# Patient Record
Sex: MALE | Race: OTHER | Employment: UNEMPLOYED | URBAN - METROPOLITAN AREA
[De-identification: names, ages, dates, MRNs, and addresses within clinical notes are randomized per-mention and may not be internally consistent; named-entity substitution may affect disease eponyms.]

---

## 2020-07-24 NOTE — LETTER
[facility] 2W/SW  [address] [facility] [address]  [phone]    Blood Transfusion Consent    In the course of your treatment, it may become necessary to administer a transfusion of blood or blood components. This form provides basic information concerning this procedure and, if signed by you, authorizes its administration. By signing this form, you agree that all of your questions about the administration of blood or blood products have been answered by the ordering medical professional or designee.    Description of Procedure  Blood is introduced into one of your veins, commonly in the arm, using a sterilized disposable needle. The amount of blood transfused, and whether the transfusion will be of blood or blood components is a judgement the physician will make based on your particular needs.    Risks  The transfusion is a common procedure of low risk.  MINOR AND TEMPORARY REACTIONS ARE NOT UNCOMMON, including a slight bruise, swelling or local reaction in the area where the needle pierces your skin, or a nonserious reaction to the transfused material itself, including headache, fever or mild skin reaction, such as rash.  Serious reactions are possible, though very unlikely, and include severe allergic reaction (shock) and destruction (hemolysis) of transfused blood cells.  Infectious diseases which are known to be transmitted by blood transfusion include certain types of viral Hepatitis(liver infection from a virus), Human Immunodeficiency Virus (HIV-1,2) infection, a viral infection known to cause Acquired Immunodeficiency Syndrome (AIDS), as well as certain other bacterial, viral, and parasitic diseases. While a minimal risk of acquiring an infectious disease from transfused blood exists, in accordance with the Federal and State law, all due care has been taken in donor selection and testing to avoid transmission of disease.    Alternatives  If loss of blood poses serious threats during your  treatment, THERE IS NO EFFECTIVE ALTERNATIVE TO BLOOD TRANSFUSION. However, if you have any further questions on this matter, your provider will fully explain the alternatives to you if it has not already been done.    I, ______________________________, have read/had read to me the above. I understand the matters bearing on the decision whether or not to authorize a transfusion of blood or blood components. I have no questions which have not been answered to my full satisfaction. I hereby consent to such transfusion as my physician may deem necessary or advisable in the course of my treatment.    ______________________________________________                    ___________________________  (Signature of Patient or Responsible party in case of minor,                 (Printed Name of Patient or incompetent, or unconscious patient)              Responsible Party)    ___________________________               _____________________  (Relationship to Patient if not self)                                    (Date and Time)    __________________________                                                           ______________________              (Signature of Witness)               (Printed Name of Witness)     Language line ()    Telephone/Verbal/Video Consent    __________________________                     ____________________  (Signature of 2nd Witness           (Printed Name of 2nd  Telephone/Verbal/Video Consent)           Witness)    Patient Name: Shane Hawthorne     : 7/10/1970                 Printed: 2024     Medical Record #: [id number]      Rev: 2023   24-Jul-2020 01:25

## 2021-05-20 ENCOUNTER — APPOINTMENT (OUTPATIENT)
Dept: CT IMAGING | Facility: HOSPITAL | Age: 51
End: 2021-05-20
Attending: EMERGENCY MEDICINE
Payer: COMMERCIAL

## 2021-05-20 ENCOUNTER — HOSPITAL ENCOUNTER (EMERGENCY)
Facility: HOSPITAL | Age: 51
Discharge: LEFT BEFORE DISCHARGE | End: 2021-05-20
Attending: EMERGENCY MEDICINE
Payer: COMMERCIAL

## 2021-05-20 VITALS
BODY MASS INDEX: 32.58 KG/M2 | SYSTOLIC BLOOD PRESSURE: 161 MMHG | TEMPERATURE: 98 F | RESPIRATION RATE: 15 BRPM | OXYGEN SATURATION: 96 % | HEIGHT: 69 IN | HEART RATE: 84 BPM | WEIGHT: 220 LBS | DIASTOLIC BLOOD PRESSURE: 105 MMHG

## 2021-05-20 DIAGNOSIS — N17.9 ACUTE RENAL FAILURE SUPERIMPOSED ON CHRONIC KIDNEY DISEASE, ON CHRONIC DIALYSIS, UNSPECIFIED ACUTE RENAL FAILURE TYPE (HCC): ICD-10-CM

## 2021-05-20 DIAGNOSIS — R41.82 ALTERED MENTAL STATUS, UNSPECIFIED ALTERED MENTAL STATUS TYPE: Primary | ICD-10-CM

## 2021-05-20 DIAGNOSIS — Z99.2 ACUTE RENAL FAILURE SUPERIMPOSED ON CHRONIC KIDNEY DISEASE, ON CHRONIC DIALYSIS, UNSPECIFIED ACUTE RENAL FAILURE TYPE (HCC): ICD-10-CM

## 2021-05-20 DIAGNOSIS — N18.9 ACUTE RENAL FAILURE SUPERIMPOSED ON CHRONIC KIDNEY DISEASE, ON CHRONIC DIALYSIS, UNSPECIFIED ACUTE RENAL FAILURE TYPE (HCC): ICD-10-CM

## 2021-05-20 PROCEDURE — 36415 COLL VENOUS BLD VENIPUNCTURE: CPT

## 2021-05-20 PROCEDURE — 80307 DRUG TEST PRSMV CHEM ANLYZR: CPT | Performed by: EMERGENCY MEDICINE

## 2021-05-20 PROCEDURE — 80048 BASIC METABOLIC PNL TOTAL CA: CPT | Performed by: EMERGENCY MEDICINE

## 2021-05-20 PROCEDURE — 82077 ASSAY SPEC XCP UR&BREATH IA: CPT | Performed by: EMERGENCY MEDICINE

## 2021-05-20 PROCEDURE — 93005 ELECTROCARDIOGRAM TRACING: CPT

## 2021-05-20 PROCEDURE — 85610 PROTHROMBIN TIME: CPT | Performed by: EMERGENCY MEDICINE

## 2021-05-20 PROCEDURE — 82962 GLUCOSE BLOOD TEST: CPT

## 2021-05-20 PROCEDURE — 81001 URINALYSIS AUTO W/SCOPE: CPT | Performed by: EMERGENCY MEDICINE

## 2021-05-20 PROCEDURE — 85025 COMPLETE CBC W/AUTO DIFF WBC: CPT | Performed by: EMERGENCY MEDICINE

## 2021-05-20 PROCEDURE — 84484 ASSAY OF TROPONIN QUANT: CPT | Performed by: EMERGENCY MEDICINE

## 2021-05-20 PROCEDURE — 93010 ELECTROCARDIOGRAM REPORT: CPT | Performed by: EMERGENCY MEDICINE

## 2021-05-20 PROCEDURE — 99284 EMERGENCY DEPT VISIT MOD MDM: CPT

## 2021-05-20 PROCEDURE — 70450 CT HEAD/BRAIN W/O DYE: CPT | Performed by: EMERGENCY MEDICINE

## 2021-05-20 RX ORDER — GLIPIZIDE 10 MG/1
20 TABLET ORAL 2 TIMES DAILY
COMMUNITY
Start: 2021-05-05

## 2021-05-20 RX ORDER — FUROSEMIDE 40 MG/1
1 TABLET ORAL 2 TIMES DAILY
COMMUNITY
Start: 2021-01-29

## 2021-05-20 RX ORDER — CARVEDILOL 25 MG/1
50 TABLET ORAL DAILY
COMMUNITY
Start: 2021-05-01

## 2021-05-20 RX ORDER — ALBUTEROL SULFATE 90 UG/1
2 AEROSOL, METERED RESPIRATORY (INHALATION) EVERY 6 HOURS PRN
COMMUNITY
Start: 2021-05-03

## 2021-05-20 RX ORDER — ATORVASTATIN CALCIUM 20 MG/1
20 TABLET, FILM COATED ORAL NIGHTLY
COMMUNITY
Start: 2021-02-26

## 2021-05-21 NOTE — ED QUICK NOTES
Orders for admission. Patient and/or next of kin aware of plan and is ready to go upstairs. Please call ED RN Selam Swartz at listed extension should you have any further questions or concerns. Thank you.     Nurse and ExtJustice Manolo BHAT, [id number]    Chief Presentation: Arsen Martinez

## 2021-05-21 NOTE — ED INITIAL ASSESSMENT (HPI)
Pt endorsed confusion to pt's spouse. +diaphoretic    bg with ems 58. 100cc of d10 given, 115 bg afterwards. Pt came from [facility] yesterday. Fully vaccinated.

## 2021-05-21 NOTE — ED QUICK NOTES
Repeat accucheck is 74. Pt provided orange juice. Pt continues to be a&ox3, cade, vss. Pt endorsed that he hasn't eaten all day.

## 2021-05-21 NOTE — ED QUICK NOTES
Pt provided and explained d/c instructions, at-home care, and follow-up. Pt in nad at this time. Iv access d/c. Vss. Luu. Ambulatory. A&ox3. Belongings with pt. All questions and concerns addressed. Pt signed AMA form.  Lynne spoke with pt at length maciel

## 2022-05-03 ENCOUNTER — OFFICE VISIT (OUTPATIENT)
Dept: INTERNAL MEDICINE CLINIC | Facility: CLINIC | Age: 52
End: 2022-05-03
Payer: COMMERCIAL

## 2022-05-03 VITALS
HEART RATE: 75 BPM | WEIGHT: 185 LBS | BODY MASS INDEX: 27.4 KG/M2 | SYSTOLIC BLOOD PRESSURE: 121 MMHG | HEIGHT: 69 IN | DIASTOLIC BLOOD PRESSURE: 67 MMHG

## 2022-05-03 DIAGNOSIS — N18.6 TYPE 2 DIABETES MELLITUS WITH CHRONIC KIDNEY DISEASE ON CHRONIC DIALYSIS, WITHOUT LONG-TERM CURRENT USE OF INSULIN (HCC): ICD-10-CM

## 2022-05-03 DIAGNOSIS — E11.22 TYPE 2 DIABETES MELLITUS WITH CHRONIC KIDNEY DISEASE ON CHRONIC DIALYSIS, WITHOUT LONG-TERM CURRENT USE OF INSULIN (HCC): ICD-10-CM

## 2022-05-03 DIAGNOSIS — K43.9 VENTRAL HERNIA WITHOUT OBSTRUCTION OR GANGRENE: ICD-10-CM

## 2022-05-03 DIAGNOSIS — E78.00 HYPERCHOLESTEROLEMIA: ICD-10-CM

## 2022-05-03 DIAGNOSIS — Z99.2 ESRD (END STAGE RENAL DISEASE) ON DIALYSIS (HCC): ICD-10-CM

## 2022-05-03 DIAGNOSIS — I10 PRIMARY HYPERTENSION: Primary | ICD-10-CM

## 2022-05-03 DIAGNOSIS — Z99.2 TYPE 2 DIABETES MELLITUS WITH CHRONIC KIDNEY DISEASE ON CHRONIC DIALYSIS, WITHOUT LONG-TERM CURRENT USE OF INSULIN (HCC): ICD-10-CM

## 2022-05-03 DIAGNOSIS — I25.10 CORONARY ARTERY DISEASE INVOLVING NATIVE CORONARY ARTERY OF NATIVE HEART WITHOUT ANGINA PECTORIS: ICD-10-CM

## 2022-05-03 DIAGNOSIS — N18.6 ESRD (END STAGE RENAL DISEASE) ON DIALYSIS (HCC): ICD-10-CM

## 2022-05-03 PROBLEM — R41.82 ALTERED MENTAL STATUS: Status: RESOLVED | Noted: 2021-05-20 | Resolved: 2022-05-03

## 2022-05-03 PROCEDURE — 99204 OFFICE O/P NEW MOD 45 MIN: CPT | Performed by: INTERNAL MEDICINE

## 2022-05-03 PROCEDURE — 3078F DIAST BP <80 MM HG: CPT | Performed by: INTERNAL MEDICINE

## 2022-05-03 PROCEDURE — 3074F SYST BP LT 130 MM HG: CPT | Performed by: INTERNAL MEDICINE

## 2022-05-03 PROCEDURE — 3008F BODY MASS INDEX DOCD: CPT | Performed by: INTERNAL MEDICINE

## 2022-05-03 RX ORDER — FUROSEMIDE 80 MG
80 TABLET ORAL 2 TIMES DAILY
COMMUNITY
Start: 2022-04-11

## 2022-05-03 RX ORDER — AMLODIPINE BESYLATE 10 MG/1
10 TABLET ORAL DAILY
COMMUNITY
Start: 2022-03-22

## 2022-05-06 ENCOUNTER — TELEPHONE (OUTPATIENT)
Dept: INTERNAL MEDICINE CLINIC | Facility: CLINIC | Age: 52
End: 2022-05-06

## 2022-05-06 NOTE — TELEPHONE ENCOUNTER
[facility] is requesting referral.     Name of specialist and specialty department :   [facility]   Reason for visit with the specialist:   Cris  Address of the specialist office:  [address] [place] [address] Padmaja Wen  Appointment date:   Not yet       CSS informed patient the turnaround time for referral is 5-7 business days. Patient was informed to check their [employer] account for referral status.

## 2022-05-10 ENCOUNTER — OFFICE VISIT (OUTPATIENT)
Dept: OPHTHALMOLOGY | Facility: CLINIC | Age: 52
End: 2022-05-10
Payer: COMMERCIAL

## 2022-05-10 DIAGNOSIS — H25.043 POSTERIOR SUBCAPSULAR AGE-RELATED CATARACT OF BOTH EYES: Primary | ICD-10-CM

## 2022-05-10 DIAGNOSIS — E11.3291 TYPE 2 DIABETES MELLITUS WITH RIGHT EYE AFFECTED BY MILD NONPROLIFERATIVE RETINOPATHY WITHOUT MACULAR EDEMA, WITHOUT LONG-TERM CURRENT USE OF INSULIN (HCC): ICD-10-CM

## 2022-05-10 PROCEDURE — 92004 COMPRE OPH EXAM NEW PT 1/>: CPT | Performed by: OPHTHALMOLOGY

## 2022-05-10 PROCEDURE — 2022F DILAT RTA XM EVC RTNOPTHY: CPT | Performed by: OPHTHALMOLOGY

## 2022-05-10 NOTE — ASSESSMENT & PLAN NOTE
Diabetes type II: mild background diabetic retinopathy in the right eye, no signs of neovascularization noted. No treatment necessary at this time. Patient was instructed to monitor vision for sudden changes and to call if visual changes noted. Discussed ocular and systemic benefits of blood sugar control. He has previously seen Dr. Bryan Ma. Will send out for records today.

## 2022-05-10 NOTE — ASSESSMENT & PLAN NOTE
Discussed with patient that cataracts in both eyes are advanced enough at this time to consider surgery; it would be patient's choice. Discussed options such as surgery or change of glasses RX. Discussed surgical risks, benefits, alternatives and recovery. Patient understands that changing glasses will not significantly improve vision and elects to have surgery. As Dr. Best is no longer performing cataract surgery, a referral to [facility] was offered. Patient agrees to be referred, so we will send complete exam and information to them and the patient will be contacted. Information on [address] [place] [address] ophthalmology given and reviewed with the patient. Patient told that he has to request a referral from his primary care for cataract surgery.

## 2022-05-10 NOTE — PATIENT INSTRUCTIONS
Posterior subcapsular age-related cataract of both eyes  Discussed with patient that cataracts in both eyes are advanced enough at this time to consider surgery; it would be patient's choice. Discussed options such as surgery or change of glasses RX. Discussed surgical risks, benefits, alternatives and recovery. Patient understands that changing glasses will not significantly improve vision and elects to have surgery. As Dr. Chris Melendez is no longer performing cataract surgery, a referral to [facility] was offered. Patient agrees to be referred, so we will send complete exam and information to them and the patient will be contacted. Information on [address] [place] [address] ophthalmology given and reviewed with the patient. Patient told that he has to request a referral from his primary care for cataract surgery. Type 2 diabetes mellitus with right eye affected by mild nonproliferative retinopathy without macular edema, without long-term current use of insulin (HCC)  Diabetes type II: mild background diabetic retinopathy in the right eye, no signs of neovascularization noted. No treatment necessary at this time. Patient was instructed to monitor vision for sudden changes and to call if visual changes noted. Discussed ocular and systemic benefits of blood sugar control. He has previously seen Dr. Mary Abarca. Will send out for records today.

## 2022-05-11 NOTE — TELEPHONE ENCOUNTER
Please generate referral if you agree with plan of care.      Thank you,  [facility]   Referral specialist

## 2022-05-14 ENCOUNTER — LAB ENCOUNTER (OUTPATIENT)
Dept: LAB | Facility: HOSPITAL | Age: 52
End: 2022-05-14
Attending: INTERNAL MEDICINE
Payer: COMMERCIAL

## 2022-05-14 DIAGNOSIS — Z99.2 TYPE 2 DIABETES MELLITUS WITH CHRONIC KIDNEY DISEASE ON CHRONIC DIALYSIS, WITHOUT LONG-TERM CURRENT USE OF INSULIN (HCC): ICD-10-CM

## 2022-05-14 DIAGNOSIS — N18.6 TYPE 2 DIABETES MELLITUS WITH CHRONIC KIDNEY DISEASE ON CHRONIC DIALYSIS, WITHOUT LONG-TERM CURRENT USE OF INSULIN (HCC): ICD-10-CM

## 2022-05-14 DIAGNOSIS — E11.22 TYPE 2 DIABETES MELLITUS WITH CHRONIC KIDNEY DISEASE ON CHRONIC DIALYSIS, WITHOUT LONG-TERM CURRENT USE OF INSULIN (HCC): ICD-10-CM

## 2022-05-14 LAB
EST. AVERAGE GLUCOSE BLD GHB EST-MCNC: 128 MG/DL (ref 68–126)
HBA1C MFR BLD: 6.1 % (ref ?–5.7)

## 2022-05-14 PROCEDURE — 36415 COLL VENOUS BLD VENIPUNCTURE: CPT

## 2022-05-14 PROCEDURE — 83036 HEMOGLOBIN GLYCOSYLATED A1C: CPT

## 2022-05-14 PROCEDURE — 3044F HG A1C LEVEL LT 7.0%: CPT | Performed by: INTERNAL MEDICINE

## 2022-05-16 ENCOUNTER — OFFICE VISIT (OUTPATIENT)
Dept: NEPHROLOGY | Facility: CLINIC | Age: 52
End: 2022-05-16
Payer: COMMERCIAL

## 2022-05-16 VITALS
WEIGHT: 183 LBS | SYSTOLIC BLOOD PRESSURE: 126 MMHG | HEART RATE: 75 BPM | BODY MASS INDEX: 27.11 KG/M2 | HEIGHT: 69 IN | DIASTOLIC BLOOD PRESSURE: 60 MMHG

## 2022-05-16 DIAGNOSIS — Z99.2 ESRD ON HEMODIALYSIS (HCC): Primary | ICD-10-CM

## 2022-05-16 DIAGNOSIS — N18.6 ESRD ON HEMODIALYSIS (HCC): Primary | ICD-10-CM

## 2022-05-16 PROCEDURE — 3008F BODY MASS INDEX DOCD: CPT | Performed by: INTERNAL MEDICINE

## 2022-05-16 PROCEDURE — 99245 OFF/OP CONSLTJ NEW/EST HI 55: CPT | Performed by: INTERNAL MEDICINE

## 2022-05-16 PROCEDURE — 3074F SYST BP LT 130 MM HG: CPT | Performed by: INTERNAL MEDICINE

## 2022-05-16 PROCEDURE — 3078F DIAST BP <80 MM HG: CPT | Performed by: INTERNAL MEDICINE

## 2022-05-16 NOTE — PATIENT INSTRUCTIONS
Continue hemodialysis on a Monday, Wednesday, Friday schedule. Please call us if you have any questions or concerns.

## 2022-05-16 NOTE — PROGRESS NOTES
05/16/22        Patient: Silvino Flowers   YOB: 1970   Date of Visit: 5/16/2022       Dear  Dr. Cornelius Lafleur MD,      Thank you for referring Silvino Flowers to my practice. Please find my assessment and plan below. As you know he is a 15-year-old male with a history of longstanding adult onset diabetes mellitus, hypertension, asthma, hypercholesterolemia who I now had the pleasure of seeing for end-stage renal disease. The patient states that he was in the [facility] system and was just recently started on chronic hemodialysis on April 15, 2022. His insurance has just recently changed and is now transferring all his care to [facility].    His past medical history is significant for adult onset diabetes mellitus x30 years. He has had problems with diabetic retinopathy, cataracts, and diabetic peripheral neuropathy. He also has longstanding hypertension. He has a history of hypercholesterolemia but is not aware of any problems with organic heart disease. However when he initiated the process for a renal transplant there was something they were concerned about. Just saw Dr. Rocky Ponce from cardiology. He ordered a number of tests. End-stage renal disease is thought to be secondary to diabetic nephropathy. Again was just started on chronic hemodialysis in April 15, 2022. Now on a Monday, Wednesday Friday schedule. Social history still is working full-time. Smokes occasional marijuana. Did smoke cigarettes but quit 30 years ago. Family history is strongly positive for diabetes but negative for renal pathology. Review of systems the patient otherwise states he is doing well without any chest pain, shortness of breath, GI or urinary tract symptoms. Still makes some urine. States though he still does not have a great appetite. 10 point review of systems is otherwise unremarkable. On physical exam his blood pressure is 126/60 with a pulse of 75 he weighed 183 pounds. Neck was supple without JVD. Lungs were clear. Heart revealed a regular rate and rhythm with an S4 but no gallops, murmurs or rubs. Abdomen was soft, flat, nontender without organomegaly, masses or bruits. Extremities revealed trace to 1+ lower extremity edema bilaterally. Much improved though according to the patient. Overall the patient appears to be getting well adjusted to end-stage renal disease and chronic hemodialysis. Clinically feels much better since dialysis was started. We will review his routine laboratory studies at the dialysis unit. He is interested in pursuing a renal transplantion. Encouraged him to do so. He will complete the cardiac work-up. We will continue to follow him at the hemodialysis unit on his Monday, Wednesday,  Friday schedule. Still has a permacath. Will need to arrange for an AV fistula in the future. Thank you very much for allowing me to participate in the care of your patient. If you have any questions please feel free to call.            Sincerely,   Sadie Bethea MD   [address]  [address]  [facility] [address]    Document electronically generated by:  Sadie Bethea MD

## 2022-05-18 ENCOUNTER — MED REC SCAN ONLY (OUTPATIENT)
Dept: INTERNAL MEDICINE CLINIC | Facility: CLINIC | Age: 52
End: 2022-05-18

## 2022-05-18 ENCOUNTER — TELEPHONE (OUTPATIENT)
Dept: INTERNAL MEDICINE CLINIC | Facility: CLINIC | Age: 52
End: 2022-05-18

## 2022-05-26 ENCOUNTER — TELEPHONE (OUTPATIENT)
Dept: CASE MANAGEMENT | Age: 52
End: 2022-05-26

## 2022-05-26 ENCOUNTER — OFFICE VISIT (OUTPATIENT)
Dept: ENDOCRINOLOGY CLINIC | Facility: CLINIC | Age: 52
End: 2022-05-26
Payer: COMMERCIAL

## 2022-05-26 VITALS
WEIGHT: 181.81 LBS | SYSTOLIC BLOOD PRESSURE: 124 MMHG | HEART RATE: 75 BPM | BODY MASS INDEX: 27 KG/M2 | DIASTOLIC BLOOD PRESSURE: 78 MMHG

## 2022-05-26 DIAGNOSIS — N18.6 END STAGE RENAL DISEASE (HCC): Primary | ICD-10-CM

## 2022-05-26 DIAGNOSIS — E11.3291 TYPE 2 DIABETES MELLITUS WITH RIGHT EYE AFFECTED BY MILD NONPROLIFERATIVE RETINOPATHY WITHOUT MACULAR EDEMA, WITHOUT LONG-TERM CURRENT USE OF INSULIN (HCC): Primary | ICD-10-CM

## 2022-05-26 LAB
GLUCOSE BLOOD: 116
TEST STRIP LOT #: NORMAL NUMERIC

## 2022-05-26 PROCEDURE — 3074F SYST BP LT 130 MM HG: CPT

## 2022-05-26 PROCEDURE — 82947 ASSAY GLUCOSE BLOOD QUANT: CPT

## 2022-05-26 PROCEDURE — 3078F DIAST BP <80 MM HG: CPT

## 2022-05-26 PROCEDURE — 99244 OFF/OP CNSLTJ NEW/EST MOD 40: CPT

## 2022-05-26 NOTE — TELEPHONE ENCOUNTER
Dr. Ryley Hernandez,    Pt is requesting intermittent FMLA due to end stage renal flare ups: 1-4 flare up per month and  1-3 appt per week. Do you support?      Thank you,  Elmer Harrison

## 2022-05-26 NOTE — TELEPHONE ENCOUNTER
Good AFternoon Dr Abilio Nunez and staff,    Fax received from [facility] care for referral to Nephrology, Dr Kwadwo Chakraborty. CPT codes: 82619, [id number], [id number]  DX code: N18.6    Please generate and sign off on referral so I can send to P/DULY for medical review. Thank you for your assistance.     [facility] [place]

## 2022-05-26 NOTE — TELEPHONE ENCOUNTER
That would potentially be the entire month. I will not authorize that. I can do 1 to 3 days with 1 episode a month.

## 2022-05-27 NOTE — TELEPHONE ENCOUNTER
Dr. Tatiana Malone,    For clarification, are you also approving the 1-3 appts per week, due to pt's dialysis appts?

## 2022-05-27 NOTE — TELEPHONE ENCOUNTER
I am confused, is he looking for FMLA for all his dialysis days? That would be about 12 days a month.

## 2022-05-27 NOTE — TELEPHONE ENCOUNTER
Dr. Nini Mcpherson,    Pt is requesting flare ups for when he feels ill/fatigue after his dialysis (1-3 flare ups per month, 1 day per episode) and 3 appointments per week due to his dialysis. Appointments are only for 1-4 hours.

## 2022-05-31 NOTE — TELEPHONE ENCOUNTER
Dr. Shields Cancer,     Please sign off on form: FMLA  -Highlight the patient and hit \"Chart\" button. -In Chart Review, w/in the Encounter tab - click 1 time on the Telephone call encounter for 5/18/22 Scroll down the telephone encounter.  -Click \"scan on\" blue Hyperlink under \"Media\" heading for KEENA Wiley 5/31/22  w/in the telephone enc.  -Click on Acknowledge button at the bottom right corner and left-click onto image, signature stamp appears and drag signature to Provider signature line. Stamp will turn blue. Close window.      Thank you,    Lula Deutsch

## 2022-06-01 NOTE — TELEPHONE ENCOUNTER
Pt aware forms are completed and faxed out. Pt will  copies at [facility] [email or web address] .

## 2022-06-10 ENCOUNTER — LAB ENCOUNTER (OUTPATIENT)
Dept: LAB | Facility: HOSPITAL | Age: 52
End: 2022-06-10
Attending: INTERNAL MEDICINE
Payer: COMMERCIAL

## 2022-06-10 DIAGNOSIS — E78.00 PURE HYPERCHOLESTEROLEMIA: ICD-10-CM

## 2022-06-10 DIAGNOSIS — I25.10 CORONARY ATHEROSCLEROSIS OF NATIVE CORONARY ARTERY: Primary | ICD-10-CM

## 2022-06-10 DIAGNOSIS — E11.3291 NONPROLIFERATIVE DIABETIC RETINOPATHY OF RIGHT EYE (HCC): ICD-10-CM

## 2022-06-10 DIAGNOSIS — I10 ESSENTIAL HYPERTENSION, MALIGNANT: ICD-10-CM

## 2022-06-10 LAB
ALBUMIN SERPL-MCNC: 3.6 G/DL (ref 3.4–5)
ALBUMIN/GLOB SERPL: 0.9 {RATIO} (ref 1–2)
ALP LIVER SERPL-CCNC: 70 U/L
ALT SERPL-CCNC: 13 U/L
ANION GAP SERPL CALC-SCNC: 8 MMOL/L (ref 0–18)
AST SERPL-CCNC: 15 U/L (ref 15–37)
BASOPHILS # BLD AUTO: 0.08 X10(3) UL (ref 0–0.2)
BASOPHILS NFR BLD AUTO: 1.1 %
BILIRUB SERPL-MCNC: 0.3 MG/DL (ref 0.1–2)
BUN BLD-MCNC: 58 MG/DL (ref 7–18)
BUN/CREAT SERPL: 8.4 (ref 10–20)
CALCIUM BLD-MCNC: 7.7 MG/DL (ref 8.5–10.1)
CHLORIDE SERPL-SCNC: 98 MMOL/L (ref 98–112)
CHOLEST SERPL-MCNC: 131 MG/DL (ref ?–200)
CO2 SERPL-SCNC: 29 MMOL/L (ref 21–32)
CREAT BLD-MCNC: 6.89 MG/DL
DEPRECATED RDW RBC AUTO: 44.9 FL (ref 35.1–46.3)
EOSINOPHIL # BLD AUTO: 0.4 X10(3) UL (ref 0–0.7)
EOSINOPHIL NFR BLD AUTO: 5.6 %
ERYTHROCYTE [DISTWIDTH] IN BLOOD BY AUTOMATED COUNT: 13.3 % (ref 11–15)
FASTING PATIENT LIPID ANSWER: YES
FASTING STATUS PATIENT QL REPORTED: YES
GLOBULIN PLAS-MCNC: 4.1 G/DL (ref 2.8–4.4)
GLUCOSE BLD-MCNC: 111 MG/DL (ref 70–99)
HCT VFR BLD AUTO: 35.1 %
HDLC SERPL-MCNC: 34 MG/DL (ref 40–59)
HGB BLD-MCNC: 11 G/DL
IMM GRANULOCYTES # BLD AUTO: 0.02 X10(3) UL (ref 0–1)
IMM GRANULOCYTES NFR BLD: 0.3 %
LDLC SERPL CALC-MCNC: 70 MG/DL (ref ?–100)
LYMPHOCYTES # BLD AUTO: 1.46 X10(3) UL (ref 1–4)
LYMPHOCYTES NFR BLD AUTO: 20.5 %
MCH RBC QN AUTO: 28.9 PG (ref 26–34)
MCHC RBC AUTO-ENTMCNC: 31.3 G/DL (ref 31–37)
MCV RBC AUTO: 92.1 FL
MONOCYTES # BLD AUTO: 0.61 X10(3) UL (ref 0.1–1)
MONOCYTES NFR BLD AUTO: 8.6 %
NEUTROPHILS # BLD AUTO: 4.56 X10 (3) UL (ref 1.5–7.7)
NEUTROPHILS # BLD AUTO: 4.56 X10(3) UL (ref 1.5–7.7)
NEUTROPHILS NFR BLD AUTO: 63.9 %
NONHDLC SERPL-MCNC: 97 MG/DL (ref ?–130)
OSMOLALITY SERPL CALC.SUM OF ELEC: 297 MOSM/KG (ref 275–295)
PLATELET # BLD AUTO: 220 10(3)UL (ref 150–450)
POTASSIUM SERPL-SCNC: 4.1 MMOL/L (ref 3.5–5.1)
PROT SERPL-MCNC: 7.7 G/DL (ref 6.4–8.2)
RBC # BLD AUTO: 3.81 X10(6)UL
SODIUM SERPL-SCNC: 135 MMOL/L (ref 136–145)
TRIGL SERPL-MCNC: 155 MG/DL (ref 30–149)
VLDLC SERPL CALC-MCNC: 24 MG/DL (ref 0–30)
WBC # BLD AUTO: 7.1 X10(3) UL (ref 4–11)

## 2022-06-10 PROCEDURE — 85025 COMPLETE CBC W/AUTO DIFF WBC: CPT

## 2022-06-10 PROCEDURE — 80061 LIPID PANEL: CPT

## 2022-06-10 PROCEDURE — 80053 COMPREHEN METABOLIC PANEL: CPT

## 2022-06-10 PROCEDURE — 36415 COLL VENOUS BLD VENIPUNCTURE: CPT

## 2022-06-14 NOTE — TELEPHONE ENCOUNTER
Please review. Protocol failed or has no protocol. Requested Prescriptions   Pending Prescriptions Disp Refills    furosemide 80 MG Oral Tab 180 tablet 1     Sig: Take 1 tablet (80 mg total) by mouth 2 (two) times a day. Hypertensive Medications Protocol Failed - 6/14/2022  1:42 PM        Failed - GFR Non- > 50     Lab Results   Component Value Date    GFRNAA 8 (L) 06/10/2022                 Passed - CMP or BMP in past 12 months        Passed - Appointment in past 6 or next 3 months           amLODIPine 10 MG Oral Tab 180 tablet 1     Sig: Take 1 tablet (10 mg total) by mouth daily. Hypertensive Medications Protocol Failed - 6/14/2022  1:42 PM        Failed - GFR Non- > 50     Lab Results   Component Value Date    GFRNAA 8 (L) 06/10/2022                 Passed - CMP or BMP in past 12 months        Passed - Appointment in past 6 or next 3 months           carvedilol 25 MG Oral Tab 90 tablet 1     Sig: Take 2 tablets (50 mg total) by mouth daily.         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 edema, without long-term current use of insulin [facility])    [facility] NAZ Spencer    Office Visit    4 weeks ago ESRD on hemodialysis [facility])    [facility], [address] [place] [address] MD Jorden    Office Visit    1 month ago Posterior subcapsular age-related cataract of both eyes    [facility] Anastacio Stinson MD    Office Visit    1 month ago Primary hypertension    [facility], [address] [facility], Varsha Gaitan MD    Office Visit            Future Appointments         Provider Department Appt Notes    In 1 week Nelsy Hernandez MD [address] Suhail Delgado f/u \"policy informed\"    In 2 months Alexandria Modest, [facility], [address] [place] [address] Endocrinology 3 MONTH FU

## 2022-06-15 RX ORDER — AMLODIPINE BESYLATE 10 MG/1
10 TABLET ORAL DAILY
Qty: 90 TABLET | Refills: 1 | Status: SHIPPED | OUTPATIENT
Start: 2022-06-15

## 2022-06-15 RX ORDER — FUROSEMIDE 80 MG
80 TABLET ORAL 2 TIMES DAILY
Qty: 180 TABLET | Refills: 1 | Status: SHIPPED | OUTPATIENT
Start: 2022-06-15

## 2022-06-15 RX ORDER — CARVEDILOL 25 MG/1
50 TABLET ORAL DAILY
Qty: 180 TABLET | Refills: 1 | Status: SHIPPED | OUTPATIENT
Start: 2022-06-15

## 2022-06-21 ENCOUNTER — OFFICE VISIT (OUTPATIENT)
Dept: INTERNAL MEDICINE CLINIC | Facility: CLINIC | Age: 52
End: 2022-06-21
Payer: COMMERCIAL

## 2022-06-21 VITALS
WEIGHT: 170 LBS | HEART RATE: 81 BPM | BODY MASS INDEX: 25.18 KG/M2 | HEIGHT: 69 IN | DIASTOLIC BLOOD PRESSURE: 70 MMHG | SYSTOLIC BLOOD PRESSURE: 118 MMHG

## 2022-06-21 DIAGNOSIS — K43.9 VENTRAL HERNIA WITHOUT OBSTRUCTION OR GANGRENE: ICD-10-CM

## 2022-06-21 DIAGNOSIS — M54.2 CERVICALGIA: ICD-10-CM

## 2022-06-21 DIAGNOSIS — Z12.11 COLON CANCER SCREENING: ICD-10-CM

## 2022-06-21 DIAGNOSIS — N18.6 ESRD (END STAGE RENAL DISEASE) ON DIALYSIS (HCC): ICD-10-CM

## 2022-06-21 DIAGNOSIS — Z00.00 ANNUAL PHYSICAL EXAM: Primary | ICD-10-CM

## 2022-06-21 DIAGNOSIS — E11.3291 TYPE 2 DIABETES MELLITUS WITH RIGHT EYE AFFECTED BY MILD NONPROLIFERATIVE RETINOPATHY WITHOUT MACULAR EDEMA, WITHOUT LONG-TERM CURRENT USE OF INSULIN (HCC): ICD-10-CM

## 2022-06-21 DIAGNOSIS — Z99.2 ESRD (END STAGE RENAL DISEASE) ON DIALYSIS (HCC): ICD-10-CM

## 2022-06-21 DIAGNOSIS — I25.10 CORONARY ARTERY DISEASE INVOLVING NATIVE CORONARY ARTERY OF NATIVE HEART WITHOUT ANGINA PECTORIS: ICD-10-CM

## 2022-06-21 DIAGNOSIS — I10 PRIMARY HYPERTENSION: ICD-10-CM

## 2022-06-21 DIAGNOSIS — E78.00 HYPERCHOLESTEROLEMIA: ICD-10-CM

## 2022-06-21 PROCEDURE — 3074F SYST BP LT 130 MM HG: CPT | Performed by: INTERNAL MEDICINE

## 2022-06-21 PROCEDURE — 3078F DIAST BP <80 MM HG: CPT | Performed by: INTERNAL MEDICINE

## 2022-06-21 PROCEDURE — 3008F BODY MASS INDEX DOCD: CPT | Performed by: INTERNAL MEDICINE

## 2022-06-21 PROCEDURE — 99213 OFFICE O/P EST LOW 20 MIN: CPT | Performed by: INTERNAL MEDICINE

## 2022-06-21 PROCEDURE — 99396 PREV VISIT EST AGE 40-64: CPT | Performed by: INTERNAL MEDICINE

## 2022-06-21 RX ORDER — SEVELAMER CARBONATE 800 MG/1
800 TABLET, FILM COATED ORAL 3 TIMES DAILY
COMMUNITY
Start: 2022-05-18

## 2022-06-22 ENCOUNTER — TELEPHONE (OUTPATIENT)
Dept: INTERNAL MEDICINE CLINIC | Facility: CLINIC | Age: 52
End: 2022-06-22

## 2022-06-22 DIAGNOSIS — Z99.2 ESRD (END STAGE RENAL DISEASE) ON DIALYSIS (HCC): Primary | ICD-10-CM

## 2022-06-22 DIAGNOSIS — N18.6 ESRD (END STAGE RENAL DISEASE) ON DIALYSIS (HCC): Primary | ICD-10-CM

## 2022-06-23 NOTE — TELEPHONE ENCOUNTER
Message # [phone]         2022 05:58p   [ISABELD]  To:  From:  STALIN Bardales MD:  Phone#:  ----------------------------------------------------------------------  Cuco Painter [phone]  [birth year], LOW BLOOD PRESSURE 105/72 DROPS WHEN PT STANDS SEES DR RAFFY Nolan at number :  PAGE:  [phone] at : EJQ- 17:58

## 2022-06-30 ENCOUNTER — TELEPHONE (OUTPATIENT)
Dept: INTERNAL MEDICINE CLINIC | Facility: CLINIC | Age: 52
End: 2022-06-30

## 2022-06-30 NOTE — TELEPHONE ENCOUNTER
Received call from Brady Navarro at [facility] requesting information about and a copy of referral for dialysis. Faxed referral to [phone].

## 2022-07-05 ENCOUNTER — HOSPITAL ENCOUNTER (EMERGENCY)
Facility: HOSPITAL | Age: 52
Discharge: HOME | End: 2022-07-05
Attending: EMERGENCY MEDICINE
Payer: COMMERCIAL

## 2022-07-05 VITALS
RESPIRATION RATE: 18 BRPM | HEART RATE: 97 BPM | SYSTOLIC BLOOD PRESSURE: 119 MMHG | DIASTOLIC BLOOD PRESSURE: 83 MMHG | OXYGEN SATURATION: 99 % | TEMPERATURE: 97 F

## 2022-07-05 DIAGNOSIS — R11.2 NAUSEA VOMITING AND DIARRHEA: Primary | ICD-10-CM

## 2022-07-05 DIAGNOSIS — R19.7 NAUSEA VOMITING AND DIARRHEA: Primary | ICD-10-CM

## 2022-07-05 LAB
ALBUMIN SERPL-MCNC: 4 G/DL (ref 3.4–5)
ALP LIVER SERPL-CCNC: 94 U/L
ALT SERPL-CCNC: 17 U/L
ANION GAP SERPL CALC-SCNC: 11 MMOL/L (ref 0–18)
AST SERPL-CCNC: 17 U/L (ref 15–37)
BASOPHILS # BLD AUTO: 0.05 X10(3) UL (ref 0–0.2)
BASOPHILS NFR BLD AUTO: 0.3 %
BILIRUB DIRECT SERPL-MCNC: 0.1 MG/DL (ref 0–0.2)
BILIRUB SERPL-MCNC: 0.6 MG/DL (ref 0.1–2)
BUN BLD-MCNC: 45 MG/DL (ref 7–18)
BUN/CREAT SERPL: 7.9 (ref 10–20)
CALCIUM BLD-MCNC: 8.5 MG/DL (ref 8.5–10.1)
CHLORIDE SERPL-SCNC: 95 MMOL/L (ref 98–112)
CO2 SERPL-SCNC: 28 MMOL/L (ref 21–32)
CREAT BLD-MCNC: 5.67 MG/DL
DEPRECATED RDW RBC AUTO: 48.2 FL (ref 35.1–46.3)
EOSINOPHIL # BLD AUTO: 0.19 X10(3) UL (ref 0–0.7)
EOSINOPHIL NFR BLD AUTO: 1.1 %
ERYTHROCYTE [DISTWIDTH] IN BLOOD BY AUTOMATED COUNT: 14.7 % (ref 11–15)
GLUCOSE BLD-MCNC: 171 MG/DL (ref 70–99)
HCT VFR BLD AUTO: 41.6 %
HGB BLD-MCNC: 13.1 G/DL
IMM GRANULOCYTES # BLD AUTO: 0.07 X10(3) UL (ref 0–1)
IMM GRANULOCYTES NFR BLD: 0.4 %
LIPASE SERPL-CCNC: 358 U/L (ref 73–393)
LYMPHOCYTES # BLD AUTO: 0.68 X10(3) UL (ref 1–4)
LYMPHOCYTES NFR BLD AUTO: 4.1 %
MCH RBC QN AUTO: 28.4 PG (ref 26–34)
MCHC RBC AUTO-ENTMCNC: 31.5 G/DL (ref 31–37)
MCV RBC AUTO: 90.2 FL
MONOCYTES # BLD AUTO: 0.69 X10(3) UL (ref 0.1–1)
MONOCYTES NFR BLD AUTO: 4.2 %
NEUTROPHILS # BLD AUTO: 14.94 X10 (3) UL (ref 1.5–7.7)
NEUTROPHILS # BLD AUTO: 14.94 X10(3) UL (ref 1.5–7.7)
NEUTROPHILS NFR BLD AUTO: 89.9 %
OSMOLALITY SERPL CALC.SUM OF ELEC: 294 MOSM/KG (ref 275–295)
PLATELET # BLD AUTO: 242 10(3)UL (ref 150–450)
POTASSIUM SERPL-SCNC: 3.3 MMOL/L (ref 3.5–5.1)
PROT SERPL-MCNC: 9.3 G/DL (ref 6.4–8.2)
RBC # BLD AUTO: 4.61 X10(6)UL
SODIUM SERPL-SCNC: 134 MMOL/L (ref 136–145)
WBC # BLD AUTO: 16.6 X10(3) UL (ref 4–11)

## 2022-07-05 PROCEDURE — 96361 HYDRATE IV INFUSION ADD-ON: CPT

## 2022-07-05 PROCEDURE — 96374 THER/PROPH/DIAG INJ IV PUSH: CPT

## 2022-07-05 PROCEDURE — 80048 BASIC METABOLIC PNL TOTAL CA: CPT | Performed by: EMERGENCY MEDICINE

## 2022-07-05 PROCEDURE — 80076 HEPATIC FUNCTION PANEL: CPT | Performed by: EMERGENCY MEDICINE

## 2022-07-05 PROCEDURE — 96375 TX/PRO/DX INJ NEW DRUG ADDON: CPT

## 2022-07-05 PROCEDURE — 99284 EMERGENCY DEPT VISIT MOD MDM: CPT

## 2022-07-05 PROCEDURE — 85025 COMPLETE CBC W/AUTO DIFF WBC: CPT | Performed by: EMERGENCY MEDICINE

## 2022-07-05 PROCEDURE — 83690 ASSAY OF LIPASE: CPT | Performed by: EMERGENCY MEDICINE

## 2022-07-05 RX ORDER — PROCHLORPERAZINE MALEATE 10 MG
10 TABLET ORAL EVERY 6 HOURS PRN
Qty: 20 TABLET | Refills: 0 | Status: SHIPPED | OUTPATIENT
Start: 2022-07-05

## 2022-07-05 RX ORDER — ONDANSETRON 2 MG/ML
4 INJECTION INTRAMUSCULAR; INTRAVENOUS ONCE
Status: COMPLETED | OUTPATIENT
Start: 2022-07-05 | End: 2022-07-05

## 2022-07-05 RX ORDER — PROCHLORPERAZINE EDISYLATE 5 MG/ML
10 INJECTION INTRAMUSCULAR; INTRAVENOUS ONCE
Status: COMPLETED | OUTPATIENT
Start: 2022-07-05 | End: 2022-07-05

## 2022-07-05 RX ORDER — PROCHLORPERAZINE MALEATE 10 MG
10 TABLET ORAL ONCE
Status: COMPLETED | OUTPATIENT
Start: 2022-07-05 | End: 2022-07-05

## 2022-07-08 RX ORDER — SODIUM CHLORIDE 9 MG/ML
INJECTION, SOLUTION INTRAVENOUS
OUTPATIENT
Start: 2022-07-09 | End: 2022-07-09

## 2022-07-12 ENCOUNTER — MED REC SCAN ONLY (OUTPATIENT)
Dept: INTERNAL MEDICINE CLINIC | Facility: CLINIC | Age: 52
End: 2022-07-12

## 2022-07-19 ENCOUNTER — HOSPITAL ENCOUNTER (OUTPATIENT)
Dept: INTERVENTIONAL RADIOLOGY/VASCULAR | Facility: HOSPITAL | Age: 52
Discharge: HOME | End: 2022-07-19
Attending: INTERNAL MEDICINE
Payer: COMMERCIAL

## 2022-07-19 DIAGNOSIS — Z01.818 PRE-OP TESTING: Primary | ICD-10-CM

## 2022-08-02 RX ORDER — FOLIC ACID/VIT B COMPLEX AND C 0.8 MG
0.8 TABLET ORAL DAILY
COMMUNITY

## 2022-08-06 ENCOUNTER — NURSE ONLY (OUTPATIENT)
Dept: LAB | Facility: HOSPITAL | Age: 52
End: 2022-08-06
Attending: INTERNAL MEDICINE
Payer: COMMERCIAL

## 2022-08-06 ENCOUNTER — HOSPITAL ENCOUNTER (OUTPATIENT)
Dept: GENERAL RADIOLOGY | Facility: HOSPITAL | Age: 52
Discharge: HOME | End: 2022-08-06
Attending: INTERNAL MEDICINE
Payer: COMMERCIAL

## 2022-08-06 DIAGNOSIS — Z01.818 PRE-OP TESTING: ICD-10-CM

## 2022-08-06 LAB
INR BLD: 1.04 (ref 0.85–1.16)
PROTHROMBIN TIME: 13.5 SECONDS (ref 11.6–14.8)

## 2022-08-06 PROCEDURE — 85610 PROTHROMBIN TIME: CPT

## 2022-08-06 PROCEDURE — 71046 X-RAY EXAM CHEST 2 VIEWS: CPT | Performed by: INTERNAL MEDICINE

## 2022-08-06 PROCEDURE — 36415 COLL VENOUS BLD VENIPUNCTURE: CPT

## 2022-08-07 LAB — SARS-COV-2 RNA RESP QL NAA+PROBE: NOT DETECTED

## 2022-08-09 ENCOUNTER — HOSPITAL ENCOUNTER (OUTPATIENT)
Dept: INTERVENTIONAL RADIOLOGY/VASCULAR | Facility: HOSPITAL | Age: 52
Discharge: HOME | End: 2022-08-10
Attending: INTERNAL MEDICINE | Admitting: INTERNAL MEDICINE
Payer: COMMERCIAL

## 2022-08-09 DIAGNOSIS — N18.6 ESRD ON DIALYSIS (HCC): ICD-10-CM

## 2022-08-09 DIAGNOSIS — Z99.2 ESRD ON DIALYSIS (HCC): ICD-10-CM

## 2022-08-09 DIAGNOSIS — R06.02 SOB (SHORTNESS OF BREATH): ICD-10-CM

## 2022-08-09 DIAGNOSIS — R94.39 ABNORMAL NUCLEAR STRESS TEST: ICD-10-CM

## 2022-08-09 DIAGNOSIS — R07.9 CHEST PAIN: ICD-10-CM

## 2022-08-09 PROBLEM — I25.10 CAD (CORONARY ARTERY DISEASE): Status: ACTIVE | Noted: 2022-08-09

## 2022-08-09 LAB
GLUCOSE BLDC GLUCOMTR-MCNC: 110 MG/DL (ref 70–99)
GLUCOSE BLDC GLUCOMTR-MCNC: 117 MG/DL (ref 70–99)
GLUCOSE BLDC GLUCOMTR-MCNC: 127 MG/DL (ref 70–99)
GLUCOSE BLDC GLUCOMTR-MCNC: 132 MG/DL (ref 70–99)
ISTAT ACTIVATED CLOTTING TIME: 225 SECONDS (ref 125–137)
ISTAT ACTIVATED CLOTTING TIME: 237 SECONDS (ref 125–137)

## 2022-08-09 PROCEDURE — 99203 OFFICE O/P NEW LOW 30 MIN: CPT | Performed by: HOSPITALIST

## 2022-08-09 PROCEDURE — B2111ZZ FLUOROSCOPY OF MULTIPLE CORONARY ARTERIES USING LOW OSMOLAR CONTRAST: ICD-10-PCS | Performed by: INTERNAL MEDICINE

## 2022-08-09 PROCEDURE — 0271356 DILATION OF CORONARY ARTERY, TWO ARTERIES, BIFURCATION, WITH TWO DRUG-ELUTING INTRALUMINAL DEVICES, PERCUTANEOUS APPROACH: ICD-10-PCS | Performed by: INTERNAL MEDICINE

## 2022-08-09 PROCEDURE — 4A033BC MEASUREMENT OF ARTERIAL PRESSURE, CORONARY, PERCUTANEOUS APPROACH: ICD-10-PCS | Performed by: INTERNAL MEDICINE

## 2022-08-09 RX ORDER — SODIUM CHLORIDE 9 MG/ML
INJECTION, SOLUTION INTRAVENOUS
Status: COMPLETED | OUTPATIENT
Start: 2022-08-09 | End: 2022-08-09

## 2022-08-09 RX ORDER — HEPARIN SODIUM 1000 [USP'U]/ML
INJECTION, SOLUTION INTRAVENOUS; SUBCUTANEOUS
Status: COMPLETED
Start: 2022-08-09 | End: 2022-08-09

## 2022-08-09 RX ORDER — TRAMADOL HYDROCHLORIDE 50 MG/1
100 TABLET ORAL EVERY 6 HOURS PRN
Status: DISCONTINUED | OUTPATIENT
Start: 2022-08-09 | End: 2022-08-10

## 2022-08-09 RX ORDER — ACETAMINOPHEN 325 MG/1
650 TABLET ORAL EVERY 4 HOURS PRN
Status: DISCONTINUED | OUTPATIENT
Start: 2022-08-09 | End: 2022-08-10

## 2022-08-09 RX ORDER — ACETAMINOPHEN 500 MG
500 TABLET ORAL EVERY 4 HOURS PRN
Status: DISCONTINUED | OUTPATIENT
Start: 2022-08-09 | End: 2022-08-10

## 2022-08-09 RX ORDER — FUROSEMIDE 40 MG/1
80 TABLET ORAL
Status: DISCONTINUED | OUTPATIENT
Start: 2022-08-09 | End: 2022-08-10

## 2022-08-09 RX ORDER — MIDAZOLAM HYDROCHLORIDE 1 MG/ML
INJECTION INTRAMUSCULAR; INTRAVENOUS
Status: COMPLETED
Start: 2022-08-09 | End: 2022-08-09

## 2022-08-09 RX ORDER — NICOTINE POLACRILEX 4 MG
15 LOZENGE BUCCAL
Status: DISCONTINUED | OUTPATIENT
Start: 2022-08-09 | End: 2022-08-10

## 2022-08-09 RX ORDER — DEXTROSE MONOHYDRATE 25 G/50ML
50 INJECTION, SOLUTION INTRAVENOUS
Status: DISCONTINUED | OUTPATIENT
Start: 2022-08-09 | End: 2022-08-10

## 2022-08-09 RX ORDER — CARVEDILOL 25 MG/1
25 TABLET ORAL 2 TIMES DAILY WITH MEALS
Status: DISCONTINUED | OUTPATIENT
Start: 2022-08-09 | End: 2022-08-10

## 2022-08-09 RX ORDER — TEMAZEPAM 15 MG/1
15 CAPSULE ORAL NIGHTLY PRN
Status: DISCONTINUED | OUTPATIENT
Start: 2022-08-09 | End: 2022-08-10

## 2022-08-09 RX ORDER — LIDOCAINE HYDROCHLORIDE 20 MG/ML
INJECTION, SOLUTION EPIDURAL; INFILTRATION; INTRACAUDAL; PERINEURAL
Status: COMPLETED
Start: 2022-08-09 | End: 2022-08-09

## 2022-08-09 RX ORDER — ATORVASTATIN CALCIUM 20 MG/1
20 TABLET, FILM COATED ORAL NIGHTLY
Status: DISCONTINUED | OUTPATIENT
Start: 2022-08-09 | End: 2022-08-10

## 2022-08-09 RX ORDER — FUROSEMIDE 40 MG/1
TABLET ORAL
Status: COMPLETED
Start: 2022-08-09 | End: 2022-08-09

## 2022-08-09 RX ORDER — NICOTINE POLACRILEX 4 MG
30 LOZENGE BUCCAL
Status: DISCONTINUED | OUTPATIENT
Start: 2022-08-09 | End: 2022-08-10

## 2022-08-09 RX ORDER — TRAMADOL HYDROCHLORIDE 50 MG/1
50 TABLET ORAL EVERY 6 HOURS PRN
Status: DISCONTINUED | OUTPATIENT
Start: 2022-08-09 | End: 2022-08-10

## 2022-08-09 RX ORDER — SEVELAMER CARBONATE 800 MG/1
800 TABLET, FILM COATED ORAL 3 TIMES DAILY
Status: DISCONTINUED | OUTPATIENT
Start: 2022-08-09 | End: 2022-08-10

## 2022-08-09 RX ORDER — AMLODIPINE BESYLATE 10 MG/1
10 TABLET ORAL DAILY
Status: DISCONTINUED | OUTPATIENT
Start: 2022-08-10 | End: 2022-08-10

## 2022-08-09 RX ORDER — NITROGLYCERIN 20 MG/100ML
INJECTION INTRAVENOUS
Status: DISCONTINUED
Start: 2022-08-09 | End: 2022-08-09 | Stop reason: WASHOUT

## 2022-08-09 RX ORDER — ALBUTEROL SULFATE 90 UG/1
2 AEROSOL, METERED RESPIRATORY (INHALATION) EVERY 6 HOURS PRN
Status: DISCONTINUED | OUTPATIENT
Start: 2022-08-09 | End: 2022-08-10

## 2022-08-09 RX ORDER — ASPIRIN 81 MG/1
81 TABLET ORAL DAILY
Status: DISCONTINUED | OUTPATIENT
Start: 2022-08-10 | End: 2022-08-10

## 2022-08-09 RX ADMIN — FUROSEMIDE 80 MG: 40 TABLET ORAL at 17:04:00

## 2022-08-09 RX ADMIN — SODIUM CHLORIDE: 9 INJECTION, SOLUTION INTRAVENOUS at 10:00:00

## 2022-08-09 RX ADMIN — SEVELAMER CARBONATE 800 MG: 800 TABLET, FILM COATED ORAL at 21:03:00

## 2022-08-09 RX ADMIN — SEVELAMER CARBONATE 800 MG: 800 TABLET, FILM COATED ORAL at 17:04:00

## 2022-08-09 RX ADMIN — CARVEDILOL 25 MG: 25 TABLET ORAL at 18:37:00

## 2022-08-09 RX ADMIN — ATORVASTATIN CALCIUM 20 MG: 20 TABLET, FILM COATED ORAL at 21:03:00

## 2022-08-09 NOTE — CARDIAC REHAB
Cardiac Rehab Phase I    Activity:  Distance   Assistance needed   Patient tolerated activity . Education:  Handouts provided and reviewed: [address] Diet: Healthy Cardiac diet reviewed. Disease Process: Disease process reviewed. Reviewed the following: SITE CARE: Reviewed      RISK FACTORS: Reviewed      SMOKING CESSATION: Reviewed      HOME EXERCISE ACTIVITY: Reviewed      OUTPATIENT CARDIAC REHAB: Referred to Cardiac Rehabilitation Phase 2.

## 2022-08-09 NOTE — DIETARY NOTE
NUTRITION EDUCATION NOTE     Received consult for cardiac nutrition education. Visited pt. Verbally reviewed basic cardiac diet restrictions. Provided with Eating Heart-Healthy and NCM handout to reinforce. Receptive to instruction. Would benefit from outpt f/u. Expect fair compliance.         Anyi Calles MS, DANIELLE, [address] [place] [address]  Clinical Dietitian  P: [phone]

## 2022-08-09 NOTE — IVS NOTE
Hand-Off     Procedure handoff report given to Eran RN  Pt's vital signs are stable. Pt denies any pain or discomfort  Procedural access site is dry and intact with no signs and symptoms of bleeding and hematoma. Pt is able to sit up and ambulate without difficulty. Pt tolerated fluids and food. Last accu-check at 1528 was 110. Activity restriction and bedrest status reviewed    Cardiac Rehab and Dietician spoke with  patient   Dr. Marilu Linder spoke with patient post procedure. Follow up wound check appointment is already scheduled with [facility] KEVIN TETE in one week    Patient will need prescription for Brilinta from MD prior to discharge home. Patient's wife at bedside given angio-seal pamphlet and patient's stent card, and cardiac rehab and dietician paperwork.     Pt transferred with family and belongings

## 2022-08-09 NOTE — PROCEDURES
[facility]  Chu Collado Patient Status:  Outpatient in a Bed    7/10/1970 MRN [id number]   Location [facility] Attending Mandy Carbajal MD   Hosp Day # 0 PCP Viviano Bumpers, MD       Cardiologist: Clifton Felton MD  Primary Proceduralist: Clifton Felton MD  Procedure Performed: LHC, IFR LAD, stent PCI LAD, stent PCI first diagonal  Date of Procedure: 2022   Indication: Abnormal cardiac PET scan    Summary of findings: Significant stenosis of the mid LAD and ostial diagonal.            Coronary Angiography  RCA:  Dominant and free of obstructive disease, supplies PDA and PL  Left main:  Patent, free of obstructive disease  Left anterior descending: Midportion 60 to 70% stenosis, supplies 2 diagonals first diagonal is large ostial 90% stenosis   circumflex:  Patent and free of obstructive disease, supplies 2 OM branches which are patent      Assessment:  IFR of the mid LAD performed 1.0-->0.87 significant      Recommendations:  Stent PCI    Lesion 1 mid LAD    Lesion Characteristics-  torturous,  calcified. Type C lesion. Pre-intervention stenosis 60%, Post intervention stenosis 0%. Pre TRENTON 2, Post TRENTON 3.    Guide-JR4, 6 [place]  Wire-IFR wire  Predilated using 3.0 x 12 mm balloon, stent PCI using frontier drug-eluting stent 3.0 x 18 mm. Plaque shift into the first diagonal.  Terumo run-through wire positioned in the first diagonal for protection. Lesion 2 ostial diagonal    Lesion Characteristics-  torturous, non calcified. Type C lesion.   Pre-intervention stenosis 99% %, Post intervention stenosis 50% Pre TRENTON 1, Post TRENTON 3.    Guide-JR4 6 Hebrew  Wire-Choice PT extra-support was positioned across the stent struts in the mid LAD into the first diagonal at that point Terumo run-through wire was then removed from the first diagonal  2.0 x 8 mm balloon position into the first diagonal balloon angioplasty was performed of the ostium of the first diagonal.  Stent PCI was then performed using 2.0 x 12 mm frontier drug-eluting stent. Patient loaded with 180 mg of Brilinta upon the completion of the procedure. Summary complex stent PCI bifurcation of the mid LAD and ostial diagonal    Description of Procedure:   After written informed consent was obtained from the patient, patient was brought to the cardiac catheterization laboratory. Patient was prepped and draped in the usual sterile fashion. Lidocaine 1% was used to infiltrate the right groin for local anesthesia and a 6 Finnish introducer sheath was inserted into the right femoral artery. Selective coronary angiography performed with JR4 catheter for RCA and JL4 catheter for LCA. Angiography performed in standard projections. Selective right femoral angiogram done assess anatomy for closure. Specimen sent to: No specimen collected  Estimated blood loss: 10 cc  Closure: Angio-Seal      IV was maintained by RN and moderate conscious sedation of versed and fentanyl was given. Patient was assessed and monitoring of oxygen, heart rate and blood pressure by nurse and myself during the exam 84 minutes.       Joy Varghese MD  08/09/22

## 2022-08-09 NOTE — PLAN OF CARE
Problem: Patient Centered Care  Goal: Patient preferences are identified and integrated in the patient's plan of care  Description: Interventions:  - What would you like us to know as we care for you?  I live with my wife  - Provide timely, complete, and accurate information to patient/family  - Incorporate patient and family knowledge, values, beliefs, and cultural backgrounds into the planning and delivery of care  - Encourage patient/family to participate in care and decision-making at the level they choose  - Honor patient and family perspectives and choices  8/9/2022 1843 by Harsh Rincon RN  Outcome: Progressing  8/9/2022 1843 by Harsh Rincon RN  Outcome: Progressing     Problem: Diabetes/Glucose Control  Goal: Glucose maintained within prescribed range  Description: INTERVENTIONS:  - Monitor Blood Glucose as ordered  - Assess for signs and symptoms of hyperglycemia and hypoglycemia  - Administer ordered medications to maintain glucose within target range  - Assess barriers to adequate nutritional intake and initiate nutrition consult as needed  - Instruct patient on self management of diabetes  8/9/2022 1843 by Harsh Rincon RN  Outcome: Progressing  8/9/2022 1843 by Harsh Rincon RN  Outcome: Progressing     Problem: Patient/Family Goals  Goal: Patient/Family Long Term Goal  Description: Patient's Long Term Goal: to go home     Interventions:  - Monitor vital signs  -Monitor pain  -Monitor cath site  - See additional Care Plan goals for specific interventions  8/9/2022 1843 by Harsh Rincon RN  Outcome: Progressing  8/9/2022 1843 by Harsh Rincon RN  Outcome: Progressing  Goal: Patient/Family Short Term Goal  Description: Patient's Short Term Goal: to feel better    Interventions:   - Monitor vital signs  -Monitor pain  -Monitor cath site  - See additional Care Plan goals for specific interventions  8/9/2022 1843 by Harsh Rincon RN  Outcome: Progressing  8/9/2022 1843 by Joanna Sosa RN  Outcome: Progressing     Problem: CARDIOVASCULAR - ADULT  Goal: Maintains optimal cardiac output and hemodynamic stability  Description: INTERVENTIONS:  - Monitor vital signs, rhythm, and trends  - Monitor for bleeding, hypotension and signs of decreased cardiac output  - Evaluate effectiveness of vasoactive medications to optimize hemodynamic stability  - Monitor arterial and/or venous puncture sites for bleeding and/or hematoma  - Assess quality of pulses, skin color and temperature  - Assess for signs of decreased coronary artery perfusion - ex. Angina  - Evaluate fluid balance, assess for edema, trend weights  Outcome: Progressing  Goal: Absence of cardiac arrhythmias or at baseline  Description: INTERVENTIONS:  - Continuous cardiac monitoring, monitor vital signs, obtain 12 lead EKG if indicated  - Evaluate effectiveness of antiarrhythmic and heart rate control medications as ordered  - Initiate emergency measures for life threatening arrhythmias  - Monitor electrolytes and administer replacement therapy as ordered  Outcome: Progressing     Pt alert and oriented X 4. No complaints throughout the day. Pt on room air. Pt had a cath completed today- accessed right groin, site is clean/dry/intact.  Safety precautions in place, call light within reach

## 2022-08-09 NOTE — H&P
[facility]    PATIENT'S NAME: Pia Serrato   ATTENDING PHYSICIAN: Dav Mars. Reginald Mosley MD   PATIENT ACCOUNT#:   [id number]    LOCATION:  [facility] 10 [facility] 10  MEDICAL RECORD #:   [id number]       YOB: 1970  ADMISSION DATE:       08/09/2022    HISTORY AND PHYSICAL EXAMINATION    CHIEF COMPLAINT:  Coronary artery disease. HISTORY OF PRESENT ILLNESS:  The patient is a 58-year-old  male with end-stage renal disease, diabetes mellitus type 2, and hypertension who recently had an abnormal nuclear stress test with reversible ischemia. Today he was brought in to the catheter lab for coronary angiogram and was found to have critical mid LAD and ostial diagonal lesions. Both were stented through a complex coronary intervention procedure. He will be kept overnight for observation. Last dialysis was yesterday. Next dialysis is due the day after tomorrow. PAST MEDICAL HISTORY:  Coronary artery disease, status post complex intervention to mid LAD and ostial diagonal 2 drug-eluting stents as outlined above; end-stage renal disease on hemodialysis; generalized osteoarthritis; diabetes mellitus type 2; hypertension; hyperlipidemia; asthma. PAST SURGICAL HISTORY:  Right internal jugular tunnel catheter, tonsillectomy, and appendectomy. MEDICATIONS:  Please see medication reconciliation list.    ALLERGIES:  No known drug allergies. FAMILY HISTORY:  Positive for diabetes mellitus type 2 and hypertension. SOCIAL HISTORY:  No tobacco, alcohol, or drug use. Lives with his family. Independent in his basic activities of daily living. REVIEW OF SYSTEMS:  The patient had progressive dyspnea on exertion recently and ended up with stress test as outlined above. Currently asymptomatic postprocedure. PHYSICAL EXAMINATION:    GENERAL:  Alert. Oriented to time, place, and person. No acute distress.   VITAL SIGNS:  Temperature 97.0, pulse 83, respiratory rate 12, blood pressure 131/83, pulse ox 97% on room air. HEENT:  Atraumatic. Oropharynx clear, moist mucous membranes. Normal hard and soft palate. Eyes:  Anicteric sclerae. NECK:  Supple. No lymphadenopathy. Trachea midline. Full range of motion. LUNGS:  Clear to auscultation bilaterally. Normal respiratory effort. HEART:  Regular rate and rhythm. S1, S2 auscultated. No murmur. ABDOMEN:  Soft, nondistended, no tenderness. Positive bowel sounds. EXTREMITIES:  No peripheral edema, clubbing, or cyanosis. Right groin Angio-Seal.  Popliteal and dorsalis pedis pulses palpated on the right side. NEUROLOGIC:  Motor and sensory intact. ASSESSMENT:    1. Coronary artery disease, status post mid left anterior descending artery and ostial diagonal 2 drug-eluting stents. 2.   End-stage renal disease on hemodialysis, seems hemodynamically stable. 3.   Hypertension. 4.   Hyperlipidemia. 5.   Diabetes mellitus type 2. PLAN:  The patient will be admitted to telemetry floor for observation. Monitor Accu-Cheks, continue his home medications, dual-antiplatelet therapy and statins, atorvastatin. Further recommendations to follow.     Dictated By Toby eCvallos MD  d: 08/09/2022 12:31:24  t: 08/09/2022 15:12:50  Job [id number]  FB/

## 2022-08-10 VITALS
HEART RATE: 79 BPM | BODY MASS INDEX: 24.71 KG/M2 | WEIGHT: 166.81 LBS | RESPIRATION RATE: 18 BRPM | DIASTOLIC BLOOD PRESSURE: 64 MMHG | OXYGEN SATURATION: 97 % | HEIGHT: 69 IN | TEMPERATURE: 98 F | SYSTOLIC BLOOD PRESSURE: 94 MMHG

## 2022-08-10 LAB
ANION GAP SERPL CALC-SCNC: 13 MMOL/L (ref 0–18)
BUN BLD-MCNC: 83 MG/DL (ref 7–18)
BUN/CREAT SERPL: 9.8 (ref 10–20)
CALCIUM BLD-MCNC: 7.7 MG/DL (ref 8.5–10.1)
CHLORIDE SERPL-SCNC: 97 MMOL/L (ref 98–112)
CO2 SERPL-SCNC: 26 MMOL/L (ref 21–32)
CREAT BLD-MCNC: 8.45 MG/DL
DEPRECATED RDW RBC AUTO: 53.8 FL (ref 35.1–46.3)
ERYTHROCYTE [DISTWIDTH] IN BLOOD BY AUTOMATED COUNT: 16 % (ref 11–15)
GFR SERPLBLD BASED ON 1.73 SQ M-ARVRAT: 7 ML/MIN/1.73M2 (ref 60–?)
GLUCOSE BLD-MCNC: 113 MG/DL (ref 70–99)
GLUCOSE BLDC GLUCOMTR-MCNC: 209 MG/DL (ref 70–99)
GLUCOSE BLDC GLUCOMTR-MCNC: 210 MG/DL (ref 70–99)
HCT VFR BLD AUTO: 31.4 %
HGB BLD-MCNC: 10 G/DL
MCH RBC QN AUTO: 29.2 PG (ref 26–34)
MCHC RBC AUTO-ENTMCNC: 31.8 G/DL (ref 31–37)
MCV RBC AUTO: 91.8 FL
OSMOLALITY SERPL CALC.SUM OF ELEC: 308 MOSM/KG (ref 275–295)
PLATELET # BLD AUTO: 182 10(3)UL (ref 150–450)
POTASSIUM SERPL-SCNC: 4.3 MMOL/L (ref 3.5–5.1)
RBC # BLD AUTO: 3.42 X10(6)UL
SODIUM SERPL-SCNC: 136 MMOL/L (ref 136–145)
WBC # BLD AUTO: 6.4 X10(3) UL (ref 4–11)

## 2022-08-10 RX ORDER — ATORVASTATIN CALCIUM 40 MG/1
40 TABLET, FILM COATED ORAL NIGHTLY
Status: DISCONTINUED | OUTPATIENT
Start: 2022-08-10 | End: 2022-08-10

## 2022-08-10 RX ORDER — CARVEDILOL 25 MG/1
25 TABLET ORAL 2 TIMES DAILY WITH MEALS
Qty: 60 TABLET | Refills: 2 | Status: SHIPPED | OUTPATIENT
Start: 2022-08-10

## 2022-08-10 RX ORDER — TRAMADOL HYDROCHLORIDE 50 MG/1
50 TABLET ORAL EVERY 12 HOURS PRN
Status: DISCONTINUED | OUTPATIENT
Start: 2022-08-10 | End: 2022-08-10

## 2022-08-10 RX ORDER — ATORVASTATIN CALCIUM 40 MG/1
40 TABLET, FILM COATED ORAL NIGHTLY
Qty: 30 TABLET | Refills: 2 | Status: SHIPPED | OUTPATIENT
Start: 2022-08-10

## 2022-08-10 RX ORDER — TRAMADOL HYDROCHLORIDE 50 MG/1
100 TABLET ORAL EVERY 12 HOURS PRN
Status: DISCONTINUED | OUTPATIENT
Start: 2022-08-10 | End: 2022-08-10

## 2022-08-10 RX ORDER — ACETAMINOPHEN 325 MG/1
650 TABLET ORAL EVERY 4 HOURS PRN
Status: DISCONTINUED | OUTPATIENT
Start: 2022-08-10 | End: 2022-08-10

## 2022-08-10 RX ORDER — ASPIRIN 81 MG/1
81 TABLET ORAL DAILY
Qty: 30 TABLET | Refills: 2 | Status: SHIPPED | OUTPATIENT
Start: 2022-08-11

## 2022-08-10 RX ADMIN — FUROSEMIDE 80 MG: 40 TABLET ORAL at 09:10:00

## 2022-08-10 RX ADMIN — SEVELAMER CARBONATE 800 MG: 800 TABLET, FILM COATED ORAL at 09:11:00

## 2022-08-10 RX ADMIN — ASPIRIN 81 MG: 81 TABLET ORAL at 09:11:00

## 2022-08-10 RX ADMIN — AMLODIPINE BESYLATE 10 MG: 10 TABLET ORAL at 09:11:00

## 2022-08-10 NOTE — PLAN OF CARE
Vitals stable. Alert and oriented, on room air, sinus on tele, and ambulates independently. Old blood on cath dressing. No complaints of pain over night. Cath site is soft and has no hematoma. Problem: Patient Centered Care  Goal: Patient preferences are identified and integrated in the patient's plan of care  Description: Interventions:  - What would you like us to know as we care for you?  I live with my wife  - Provide timely, complete, and accurate information to patient/family  - Incorporate patient and family knowledge, values, beliefs, and cultural backgrounds into the planning and delivery of care  - Encourage patient/family to participate in care and decision-making at the level they choose  - Honor patient and family perspectives and choices  Outcome: Progressing     Problem: Diabetes/Glucose Control  Goal: Glucose maintained within prescribed range  Description: INTERVENTIONS:  - Monitor Blood Glucose as ordered  - Assess for signs and symptoms of hyperglycemia and hypoglycemia  - Administer ordered medications to maintain glucose within target range  - Assess barriers to adequate nutritional intake and initiate nutrition consult as needed  - Instruct patient on self management of diabetes  Outcome: Progressing     Problem: Patient/Family Goals  Goal: Patient/Family Long Term Goal  Description: Patient's Long Term Goal: to go home     Interventions:  - Monitor vital signs  -Monitor pain  -Monitor cath site  - See additional Care Plan goals for specific interventions  Outcome: Progressing  Goal: Patient/Family Short Term Goal  Description: Patient's Short Term Goal: to feel better    Interventions:   - Monitor vital signs  -Monitor pain  -Monitor cath site  - See additional Care Plan goals for specific interventions  Outcome: Progressing     Problem: CARDIOVASCULAR - ADULT  Goal: Maintains optimal cardiac output and hemodynamic stability  Description: INTERVENTIONS:  - Monitor vital signs, rhythm, and trends  - Monitor for bleeding, hypotension and signs of decreased cardiac output  - Evaluate effectiveness of vasoactive medications to optimize hemodynamic stability  - Monitor arterial and/or venous puncture sites for bleeding and/or hematoma  - Assess quality of pulses, skin color and temperature  - Assess for signs of decreased coronary artery perfusion - ex.  Angina  - Evaluate fluid balance, assess for edema, trend weights  Outcome: Progressing  Goal: Absence of cardiac arrhythmias or at baseline  Description: INTERVENTIONS:  - Continuous cardiac monitoring, monitor vital signs, obtain 12 lead EKG if indicated  - Evaluate effectiveness of antiarrhythmic and heart rate control medications as ordered  - Initiate emergency measures for life threatening arrhythmias  - Monitor electrolytes and administer replacement therapy as ordered  Outcome: Progressing

## 2022-08-10 NOTE — PROGRESS NOTES
Went over discharge instructions and follow up appointments with pt. Went over medications and when to take them next. Provided pt with Brilinta coupon. Returned all bedside belongings. Removed IV and tele. Pt discharging.

## 2022-08-10 NOTE — DIETARY NOTE
Brief Nutrition Note:    Pt admitted for CAD s/p cath. Pt with ESRD on HD. Pt visited. Pt reports good po intake now and PTA, no changes in appetite. PO intake averaging 80%. Pt reports weight loss since starting dialysis in April, denies unintentional weight loss. Pt appears well-nourished per visual exam. Pt is at no nutrition risk at this time. F/u per protocol. Please consult nutrition services if earlier intervention is indicated.     Wt Readings from Last 6 Encounters:  08/10/22 : 75.7 kg (166 lb 12.8 oz)  06/21/22 : 77.1 kg (170 lb)  05/26/22 : 82.5 kg (181 lb 12.8 oz)  05/16/22 : 83 kg (183 lb)  05/03/22 : 83.9 kg (185 lb)  05/20/21 : 99.8 kg (220 lb)    Percent Meals Eaten (last 3 days)     Date/Time Percent Meals Eaten (%)    08/09/22 1718 80 %        Anyi Calles MS, DANIELLE, RDN, LDN  Clinical Dietitian  P: [phone]

## 2022-08-10 NOTE — PLAN OF CARE
Problem: Patient Centered Care  Goal: Patient preferences are identified and integrated in the patient's plan of care  Description: Interventions:  - What would you like us to know as we care for you?  I live with my wife  - Provide timely, complete, and accurate information to patient/family  - Incorporate patient and family knowledge, values, beliefs, and cultural backgrounds into the planning and delivery of care  - Encourage patient/family to participate in care and decision-making at the level they choose  - Honor patient and family perspectives and choices  Outcome: Progressing     Problem: Diabetes/Glucose Control  Goal: Glucose maintained within prescribed range  Description: INTERVENTIONS:  - Monitor Blood Glucose as ordered  - Assess for signs and symptoms of hyperglycemia and hypoglycemia  - Administer ordered medications to maintain glucose within target range  - Assess barriers to adequate nutritional intake and initiate nutrition consult as needed  - Instruct patient on self management of diabetes  Outcome: Progressing     Problem: Patient/Family Goals  Goal: Patient/Family Long Term Goal  Description: Patient's Long Term Goal: to go home     Interventions:  - Monitor vital signs  -Monitor pain  -Monitor cath site  - See additional Care Plan goals for specific interventions  Outcome: Progressing  Goal: Patient/Family Short Term Goal  Description: Patient's Short Term Goal: to feel better    Interventions:   - Monitor vital signs  -Monitor pain  -Monitor cath site  - See additional Care Plan goals for specific interventions  Outcome: Progressing     Problem: CARDIOVASCULAR - ADULT  Goal: Maintains optimal cardiac output and hemodynamic stability  Description: INTERVENTIONS:  - Monitor vital signs, rhythm, and trends  - Monitor for bleeding, hypotension and signs of decreased cardiac output  - Evaluate effectiveness of vasoactive medications to optimize hemodynamic stability  - Monitor arterial and/or venous puncture sites for bleeding and/or hematoma  - Assess quality of pulses, skin color and temperature  - Assess for signs of decreased coronary artery perfusion - ex. Angina  - Evaluate fluid balance, assess for edema, trend weights  Outcome: Progressing  Goal: Absence of cardiac arrhythmias or at baseline  Description: INTERVENTIONS:  - Continuous cardiac monitoring, monitor vital signs, obtain 12 lead EKG if indicated  - Evaluate effectiveness of antiarrhythmic and heart rate control medications as ordered  - Initiate emergency measures for life threatening arrhythmias  - Monitor electrolytes and administer replacement therapy as ordered  Outcome: Progressing     Pt alert and oriented X 4. Pt on room air. No complaints of pain. Dressing changed on cath site, pt ambulated and dressing remained clean/dry/intact. Pt refused morning and afternoon insulin. Safety precautions in place, call light within reach. Plan is for discharge today.

## 2022-08-11 ENCOUNTER — TELEPHONE (OUTPATIENT)
Dept: ENDOCRINOLOGY CLINIC | Facility: CLINIC | Age: 52
End: 2022-08-11

## 2022-08-11 DIAGNOSIS — E11.65 UNCONTROLLED TYPE 2 DIABETES MELLITUS WITH HYPERGLYCEMIA (HCC): Primary | ICD-10-CM

## 2022-08-11 NOTE — TELEPHONE ENCOUNTER
Spoke to patient to relay message below - patient stated understanding and will complete fast labs prior to apt on 8/26

## 2022-08-11 NOTE — TELEPHONE ENCOUNTER
Patient would like to know if there are any labs that he needs to do before his upcoming appointment . Please call

## 2022-08-11 NOTE — TELEPHONE ENCOUNTER
Appointment scheduled 8/26. Recent hospitalization. Geoffrey Blevins, please advise if any labs are required.

## 2022-08-18 ENCOUNTER — LAB ENCOUNTER (OUTPATIENT)
Dept: LAB | Facility: HOSPITAL | Age: 52
End: 2022-08-18
Payer: COMMERCIAL

## 2022-08-18 DIAGNOSIS — E11.65 UNCONTROLLED TYPE 2 DIABETES MELLITUS WITH HYPERGLYCEMIA (HCC): ICD-10-CM

## 2022-08-18 LAB
CHOLEST SERPL-MCNC: 117 MG/DL (ref ?–200)
FASTING PATIENT LIPID ANSWER: YES
HDLC SERPL-MCNC: 38 MG/DL (ref 40–59)
LDLC SERPL CALC-MCNC: 53 MG/DL (ref ?–100)
NONHDLC SERPL-MCNC: 79 MG/DL (ref ?–130)
TRIGL SERPL-MCNC: 153 MG/DL (ref 30–149)
VLDLC SERPL CALC-MCNC: 22 MG/DL (ref 0–30)

## 2022-08-18 PROCEDURE — 80061 LIPID PANEL: CPT

## 2022-08-18 PROCEDURE — 36415 COLL VENOUS BLD VENIPUNCTURE: CPT

## 2022-08-22 ENCOUNTER — HOSPITAL ENCOUNTER (EMERGENCY)
Facility: HOSPITAL | Age: 52
Discharge: HOME | End: 2022-08-22
Attending: EMERGENCY MEDICINE
Payer: COMMERCIAL

## 2022-08-22 VITALS
DIASTOLIC BLOOD PRESSURE: 84 MMHG | HEART RATE: 97 BPM | OXYGEN SATURATION: 96 % | SYSTOLIC BLOOD PRESSURE: 144 MMHG | RESPIRATION RATE: 18 BRPM

## 2022-08-22 DIAGNOSIS — N18.6 ESRD ON HEMODIALYSIS (HCC): ICD-10-CM

## 2022-08-22 DIAGNOSIS — R19.7 NAUSEA VOMITING AND DIARRHEA: Primary | ICD-10-CM

## 2022-08-22 DIAGNOSIS — I10 ELEVATED BLOOD PRESSURE READING IN OFFICE WITH DIAGNOSIS OF HYPERTENSION: ICD-10-CM

## 2022-08-22 DIAGNOSIS — Z99.2 ESRD ON HEMODIALYSIS (HCC): ICD-10-CM

## 2022-08-22 DIAGNOSIS — R11.2 NAUSEA VOMITING AND DIARRHEA: Primary | ICD-10-CM

## 2022-08-22 LAB
ANION GAP SERPL CALC-SCNC: 13 MMOL/L (ref 0–18)
BASOPHILS # BLD AUTO: 0.02 X10(3) UL (ref 0–0.2)
BASOPHILS NFR BLD AUTO: 0.1 %
BUN BLD-MCNC: 90 MG/DL (ref 7–18)
BUN/CREAT SERPL: 9.4 (ref 10–20)
CALCIUM BLD-MCNC: 8.4 MG/DL (ref 8.5–10.1)
CHLORIDE SERPL-SCNC: 100 MMOL/L (ref 98–112)
CO2 SERPL-SCNC: 21 MMOL/L (ref 21–32)
CREAT BLD-MCNC: 9.6 MG/DL
DEPRECATED RDW RBC AUTO: 59.4 FL (ref 35.1–46.3)
EOSINOPHIL # BLD AUTO: 0.01 X10(3) UL (ref 0–0.7)
EOSINOPHIL NFR BLD AUTO: 0.1 %
ERYTHROCYTE [DISTWIDTH] IN BLOOD BY AUTOMATED COUNT: 17.2 % (ref 11–15)
GFR SERPLBLD BASED ON 1.73 SQ M-ARVRAT: 6 ML/MIN/1.73M2 (ref 60–?)
GLUCOSE BLD-MCNC: 219 MG/DL (ref 70–99)
HCT VFR BLD AUTO: 39 %
HGB BLD-MCNC: 12.4 G/DL
IMM GRANULOCYTES # BLD AUTO: 0.04 X10(3) UL (ref 0–1)
IMM GRANULOCYTES NFR BLD: 0.3 %
LYMPHOCYTES # BLD AUTO: 0.5 X10(3) UL (ref 1–4)
LYMPHOCYTES NFR BLD AUTO: 3.6 %
MCH RBC QN AUTO: 29.5 PG (ref 26–34)
MCHC RBC AUTO-ENTMCNC: 31.8 G/DL (ref 31–37)
MCV RBC AUTO: 92.9 FL
MONOCYTES # BLD AUTO: 0.25 X10(3) UL (ref 0.1–1)
MONOCYTES NFR BLD AUTO: 1.8 %
NEUTROPHILS # BLD AUTO: 12.94 X10 (3) UL (ref 1.5–7.7)
NEUTROPHILS # BLD AUTO: 12.94 X10(3) UL (ref 1.5–7.7)
NEUTROPHILS NFR BLD AUTO: 94.1 %
OSMOLALITY SERPL CALC.SUM OF ELEC: 312 MOSM/KG (ref 275–295)
PLATELET # BLD AUTO: 284 10(3)UL (ref 150–450)
POTASSIUM SERPL-SCNC: 3.8 MMOL/L (ref 3.5–5.1)
RBC # BLD AUTO: 4.2 X10(6)UL
SODIUM SERPL-SCNC: 134 MMOL/L (ref 136–145)
WBC # BLD AUTO: 13.8 X10(3) UL (ref 4–11)

## 2022-08-22 PROCEDURE — 80048 BASIC METABOLIC PNL TOTAL CA: CPT | Performed by: EMERGENCY MEDICINE

## 2022-08-22 PROCEDURE — 80048 BASIC METABOLIC PNL TOTAL CA: CPT

## 2022-08-22 PROCEDURE — 85025 COMPLETE CBC W/AUTO DIFF WBC: CPT

## 2022-08-22 PROCEDURE — 99284 EMERGENCY DEPT VISIT MOD MDM: CPT

## 2022-08-22 PROCEDURE — 85025 COMPLETE CBC W/AUTO DIFF WBC: CPT | Performed by: EMERGENCY MEDICINE

## 2022-08-22 PROCEDURE — 96374 THER/PROPH/DIAG INJ IV PUSH: CPT

## 2022-08-22 RX ORDER — ONDANSETRON 2 MG/ML
4 INJECTION INTRAMUSCULAR; INTRAVENOUS ONCE
Status: COMPLETED | OUTPATIENT
Start: 2022-08-22 | End: 2022-08-22

## 2022-08-22 RX ORDER — ONDANSETRON 4 MG/1
4 TABLET, ORALLY DISINTEGRATING ORAL EVERY 4 HOURS PRN
Qty: 10 TABLET | Refills: 0 | Status: SHIPPED | OUTPATIENT
Start: 2022-08-22 | End: 2022-08-29

## 2022-08-22 RX ORDER — ONDANSETRON 2 MG/ML
INJECTION INTRAMUSCULAR; INTRAVENOUS
Status: COMPLETED
Start: 2022-08-22 | End: 2022-08-22

## 2022-08-22 NOTE — ED INITIAL ASSESSMENT (HPI)
Patient c/o nausea, vomiting and diarrhea that started today.  On dialysis  M,W,F, did not receive dialysis today

## 2022-08-26 ENCOUNTER — HOSPITAL ENCOUNTER (EMERGENCY)
Facility: HOSPITAL | Age: 52
Discharge: HOME | End: 2022-08-27
Attending: EMERGENCY MEDICINE
Payer: COMMERCIAL

## 2022-08-26 DIAGNOSIS — R11.2 NAUSEA AND VOMITING IN ADULT: Primary | ICD-10-CM

## 2022-08-26 LAB
BASOPHILS # BLD AUTO: 0.02 X10(3) UL (ref 0–0.2)
BASOPHILS NFR BLD AUTO: 0.1 %
DEPRECATED RDW RBC AUTO: 55.8 FL (ref 35.1–46.3)
EOSINOPHIL # BLD AUTO: 0.09 X10(3) UL (ref 0–0.7)
EOSINOPHIL NFR BLD AUTO: 0.6 %
ERYTHROCYTE [DISTWIDTH] IN BLOOD BY AUTOMATED COUNT: 15.9 % (ref 11–15)
HCT VFR BLD AUTO: 36.7 %
HGB BLD-MCNC: 11.6 G/DL
IMM GRANULOCYTES # BLD AUTO: 0.06 X10(3) UL (ref 0–1)
IMM GRANULOCYTES NFR BLD: 0.4 %
LYMPHOCYTES # BLD AUTO: 0.56 X10(3) UL (ref 1–4)
LYMPHOCYTES NFR BLD AUTO: 3.9 %
MCH RBC QN AUTO: 29.8 PG (ref 26–34)
MCHC RBC AUTO-ENTMCNC: 31.6 G/DL (ref 31–37)
MCV RBC AUTO: 94.3 FL
MONOCYTES # BLD AUTO: 0.69 X10(3) UL (ref 0.1–1)
MONOCYTES NFR BLD AUTO: 4.8 %
NEUTROPHILS # BLD AUTO: 13.02 X10 (3) UL (ref 1.5–7.7)
NEUTROPHILS # BLD AUTO: 13.02 X10(3) UL (ref 1.5–7.7)
NEUTROPHILS NFR BLD AUTO: 90.2 %
PLATELET # BLD AUTO: 208 10(3)UL (ref 150–450)
RBC # BLD AUTO: 3.89 X10(6)UL
WBC # BLD AUTO: 14.4 X10(3) UL (ref 4–11)

## 2022-08-26 PROCEDURE — 96375 TX/PRO/DX INJ NEW DRUG ADDON: CPT

## 2022-08-26 PROCEDURE — 99284 EMERGENCY DEPT VISIT MOD MDM: CPT

## 2022-08-26 PROCEDURE — 80048 BASIC METABOLIC PNL TOTAL CA: CPT | Performed by: EMERGENCY MEDICINE

## 2022-08-26 PROCEDURE — 93010 ELECTROCARDIOGRAM REPORT: CPT | Performed by: EMERGENCY MEDICINE

## 2022-08-26 PROCEDURE — 85025 COMPLETE CBC W/AUTO DIFF WBC: CPT | Performed by: EMERGENCY MEDICINE

## 2022-08-26 PROCEDURE — 96374 THER/PROPH/DIAG INJ IV PUSH: CPT

## 2022-08-26 PROCEDURE — 93005 ELECTROCARDIOGRAM TRACING: CPT

## 2022-08-26 RX ORDER — ONDANSETRON 2 MG/ML
4 INJECTION INTRAMUSCULAR; INTRAVENOUS ONCE
Status: COMPLETED | OUTPATIENT
Start: 2022-08-26 | End: 2022-08-26

## 2022-08-27 VITALS
RESPIRATION RATE: 22 BRPM | HEART RATE: 93 BPM | TEMPERATURE: 97 F | DIASTOLIC BLOOD PRESSURE: 78 MMHG | SYSTOLIC BLOOD PRESSURE: 142 MMHG | OXYGEN SATURATION: 93 %

## 2022-08-27 LAB
ANION GAP SERPL CALC-SCNC: 8 MMOL/L (ref 0–18)
BUN BLD-MCNC: 31 MG/DL (ref 7–18)
BUN/CREAT SERPL: 7.3 (ref 10–20)
CALCIUM BLD-MCNC: 7.9 MG/DL (ref 8.5–10.1)
CHLORIDE SERPL-SCNC: 98 MMOL/L (ref 98–112)
CO2 SERPL-SCNC: 29 MMOL/L (ref 21–32)
CREAT BLD-MCNC: 4.26 MG/DL
GFR SERPLBLD BASED ON 1.73 SQ M-ARVRAT: 16 ML/MIN/1.73M2 (ref 60–?)
GLUCOSE BLD-MCNC: 223 MG/DL (ref 70–99)
OSMOLALITY SERPL CALC.SUM OF ELEC: 293 MOSM/KG (ref 275–295)
POTASSIUM SERPL-SCNC: 3.5 MMOL/L (ref 3.5–5.1)
SODIUM SERPL-SCNC: 135 MMOL/L (ref 136–145)

## 2022-08-27 RX ORDER — ONDANSETRON 4 MG/1
4 TABLET, ORALLY DISINTEGRATING ORAL EVERY 4 HOURS PRN
Qty: 10 TABLET | Refills: 0 | Status: SHIPPED | OUTPATIENT
Start: 2022-08-27 | End: 2022-09-03

## 2022-08-27 RX ORDER — HALOPERIDOL 5 MG/ML
5 INJECTION INTRAMUSCULAR ONCE
Status: COMPLETED | OUTPATIENT
Start: 2022-08-27 | End: 2022-08-27

## 2022-08-27 NOTE — ED INITIAL ASSESSMENT (HPI)
Patient arrives ambulatory through triage with c/o of nausea, vomiting, and dizziness after he finished dialysis at [facility]

## 2022-08-27 NOTE — ED QUICK NOTES
Pt presents with nausea vomiting and diarrhea that started after dialysis today pt reports he had the same thing on Monday and was seen in ER was given nausea medications but was not able to tolerate it. Pt was fully dialyzed today, dialysis to right upper chest dressing clean dry and intact, pt A&OX3 non labored breathing denies cp/sob/dizziness/chills/fever/ pt changed into gown, placed on monitor, oriented to room, wife at bedside, iv established. Call light within reach. Pt wife reports pt smokes marijuana everyday.

## 2022-08-29 ENCOUNTER — TELEPHONE (OUTPATIENT)
Dept: INTERNAL MEDICINE CLINIC | Facility: CLINIC | Age: 52
End: 2022-08-29

## 2022-08-29 NOTE — TELEPHONE ENCOUNTER
Patient states he was told by Dr Jeffrey Solares that he needs a referral from his PCP for [facility] specialist. Patient is requesting a referral from Dr Kathya Godinez.

## 2022-08-30 ENCOUNTER — OFFICE VISIT (OUTPATIENT)
Dept: INTERNAL MEDICINE CLINIC | Facility: CLINIC | Age: 52
End: 2022-08-30
Payer: COMMERCIAL

## 2022-08-30 VITALS
HEART RATE: 81 BPM | HEIGHT: 69 IN | SYSTOLIC BLOOD PRESSURE: 104 MMHG | BODY MASS INDEX: 24.73 KG/M2 | WEIGHT: 167 LBS | DIASTOLIC BLOOD PRESSURE: 71 MMHG

## 2022-08-30 DIAGNOSIS — H25.9 AGE-RELATED CATARACT OF BOTH EYES, UNSPECIFIED AGE-RELATED CATARACT TYPE: ICD-10-CM

## 2022-08-30 DIAGNOSIS — K43.9 VENTRAL HERNIA WITHOUT OBSTRUCTION OR GANGRENE: ICD-10-CM

## 2022-08-30 DIAGNOSIS — R11.2 NAUSEA AND VOMITING, UNSPECIFIED VOMITING TYPE: Primary | ICD-10-CM

## 2022-08-30 PROCEDURE — 3008F BODY MASS INDEX DOCD: CPT | Performed by: INTERNAL MEDICINE

## 2022-08-30 PROCEDURE — 99214 OFFICE O/P EST MOD 30 MIN: CPT | Performed by: INTERNAL MEDICINE

## 2022-08-30 PROCEDURE — 3074F SYST BP LT 130 MM HG: CPT | Performed by: INTERNAL MEDICINE

## 2022-08-30 PROCEDURE — 3078F DIAST BP <80 MM HG: CPT | Performed by: INTERNAL MEDICINE

## 2022-08-30 RX ORDER — ONDANSETRON 4 MG/1
4 TABLET, ORALLY DISINTEGRATING ORAL EVERY 8 HOURS PRN
Qty: 10 TABLET | Refills: 0 | Status: SHIPPED | OUTPATIENT
Start: 2022-08-30

## 2022-08-31 ENCOUNTER — TELEPHONE (OUTPATIENT)
Dept: INTERNAL MEDICINE CLINIC | Facility: CLINIC | Age: 52
End: 2022-08-31

## 2022-08-31 DIAGNOSIS — H25.9 AGE-RELATED CATARACT OF BOTH EYES, UNSPECIFIED AGE-RELATED CATARACT TYPE: Primary | ICD-10-CM

## 2022-09-15 ENCOUNTER — OFFICE VISIT (OUTPATIENT)
Dept: ENDOCRINOLOGY CLINIC | Facility: CLINIC | Age: 52
End: 2022-09-15
Payer: COMMERCIAL

## 2022-09-15 ENCOUNTER — TELEPHONE (OUTPATIENT)
Dept: ENDOCRINOLOGY CLINIC | Facility: CLINIC | Age: 52
End: 2022-09-15

## 2022-09-15 ENCOUNTER — LAB ENCOUNTER (OUTPATIENT)
Dept: LAB | Facility: HOSPITAL | Age: 52
End: 2022-09-15
Payer: COMMERCIAL

## 2022-09-15 VITALS
WEIGHT: 168 LBS | SYSTOLIC BLOOD PRESSURE: 100 MMHG | BODY MASS INDEX: 25 KG/M2 | DIASTOLIC BLOOD PRESSURE: 65 MMHG | HEART RATE: 83 BPM

## 2022-09-15 DIAGNOSIS — E11.65 UNCONTROLLED TYPE 2 DIABETES MELLITUS WITH HYPERGLYCEMIA (HCC): ICD-10-CM

## 2022-09-15 DIAGNOSIS — E11.65 UNCONTROLLED TYPE 2 DIABETES MELLITUS WITH HYPERGLYCEMIA (HCC): Primary | ICD-10-CM

## 2022-09-15 LAB
CARTRIDGE LOT#: NORMAL NUMERIC
CREAT UR-SCNC: 122 MG/DL
GLUCOSE BLOOD: 159
HEMOGLOBIN A1C: 5.6 % (ref 4.3–5.6)
MICROALBUMIN UR-MCNC: 127 MG/DL
MICROALBUMIN/CREAT 24H UR-RTO: 1041 UG/MG (ref ?–30)
TEST STRIP LOT #: NORMAL NUMERIC

## 2022-09-15 PROCEDURE — 3060F POS MICROALBUMINURIA REV: CPT

## 2022-09-15 PROCEDURE — 3074F SYST BP LT 130 MM HG: CPT

## 2022-09-15 PROCEDURE — 99214 OFFICE O/P EST MOD 30 MIN: CPT

## 2022-09-15 PROCEDURE — 3044F HG A1C LEVEL LT 7.0%: CPT

## 2022-09-15 PROCEDURE — 82570 ASSAY OF URINE CREATININE: CPT

## 2022-09-15 PROCEDURE — 82947 ASSAY GLUCOSE BLOOD QUANT: CPT

## 2022-09-15 PROCEDURE — 83036 HEMOGLOBIN GLYCOSYLATED A1C: CPT

## 2022-09-15 PROCEDURE — 3078F DIAST BP <80 MM HG: CPT

## 2022-09-15 PROCEDURE — 82043 UR ALBUMIN QUANTITATIVE: CPT

## 2022-09-15 NOTE — TELEPHONE ENCOUNTER
LM advising patient that A1c will be checked at OV today - advised patient to call if he wants A1c checked at lab (not during OV)

## 2022-10-18 ENCOUNTER — TELEPHONE (OUTPATIENT)
Dept: INTERNAL MEDICINE CLINIC | Facility: CLINIC | Age: 52
End: 2022-10-18

## 2022-10-18 DIAGNOSIS — E11.3291 TYPE 2 DIABETES MELLITUS WITH RIGHT EYE AFFECTED BY MILD NONPROLIFERATIVE RETINOPATHY WITHOUT MACULAR EDEMA, WITHOUT LONG-TERM CURRENT USE OF INSULIN (HCC): ICD-10-CM

## 2022-10-18 DIAGNOSIS — H25.9 AGE-RELATED CATARACT OF BOTH EYES, UNSPECIFIED AGE-RELATED CATARACT TYPE: Primary | ICD-10-CM

## 2022-10-18 DIAGNOSIS — E13.319: ICD-10-CM

## 2022-10-18 NOTE — TELEPHONE ENCOUNTER
Patient states he had an appointment with Dr. Brien Wilson and does not want to go back to that office due to poor treatment from office staff. Patient requesting new referral for Dr. Saucedo     Patient is requesting referral.     Name of specialist and specialty department : Dr. Saucedo. Ophthamologist    Reason for visit with the specialist:  Cataract    Address of the specialist [address] [place] [address]    Appointment date: needs referral first.     Fax# [phone]  Phone# [phone]         CSS informed patient the turnaround time for referral is 5-7 business days. Patient was informed to check their [employer] account for referral status.

## 2022-10-19 ENCOUNTER — OFFICE VISIT (OUTPATIENT)
Dept: SURGERY | Facility: CLINIC | Age: 52
End: 2022-10-19
Payer: COMMERCIAL

## 2022-10-19 ENCOUNTER — TELEPHONE (OUTPATIENT)
Dept: CASE MANAGEMENT | Age: 52
End: 2022-10-19

## 2022-10-19 VITALS — WEIGHT: 170 LBS | HEIGHT: 69 IN | BODY MASS INDEX: 25.18 KG/M2

## 2022-10-19 DIAGNOSIS — E13.319: Primary | ICD-10-CM

## 2022-10-19 DIAGNOSIS — K43.6 VENTRAL HERNIA WITH OBSTRUCTION AND WITHOUT GANGRENE: Primary | ICD-10-CM

## 2022-10-19 PROCEDURE — 99244 OFF/OP CNSLTJ NEW/EST MOD 40: CPT | Performed by: SURGERY

## 2022-10-19 PROCEDURE — 3008F BODY MASS INDEX DOCD: CPT | Performed by: SURGERY

## 2022-10-19 NOTE — TELEPHONE ENCOUNTER
Dr. Sophia Daly with [place] [id number] [facility] requested a referral for consult with Dr. Daniel Phillips. Pended referral please review diagnosis and sign off if you agree. Thank you.   Dameon Allen

## 2022-10-20 NOTE — TELEPHONE ENCOUNTER
Patient contacted name and  verified. Patient stated had issues with staff with initial ophthalmologist referral provided. Patient stated he believes one doctor is a retina specialist and one ophthalmologist and at one point he was seeing both. Per patient does need Dr. Tae Rawls, and if Dr. Camelia Gomez would like him to see the retina specialist he would like referral. Routed to  to advise.

## 2022-10-20 NOTE — TELEPHONE ENCOUNTER
Good Morning Dr Pierre Kinds and staff,    Please see message below and advise. Patient has pending referral for Dr Ambar Kang and now asking for Dr Alexandra Wade also? Please have staff reach out to patient to clarify what opthamologist he wishes to see.     Thank you for your help    Gisele Osborne

## 2022-10-20 NOTE — TELEPHONE ENCOUNTER
Referral for Dr. Xenia Valerio was already signed by Sherita Guthrie PA-C. Pended referral for Dr. Cr Griffin, please review dx and sign if approve.

## 2022-11-05 RX ORDER — ALBUTEROL SULFATE 90 UG/1
AEROSOL, METERED RESPIRATORY (INHALATION)
Qty: 8.5 EACH | Refills: 3 | Status: SHIPPED | OUTPATIENT
Start: 2022-11-05

## 2022-11-05 RX ORDER — FUROSEMIDE 80 MG
TABLET ORAL
Qty: 180 TABLET | Refills: 1 | Status: SHIPPED | OUTPATIENT
Start: 2022-11-05

## 2022-11-15 ENCOUNTER — TELEPHONE (OUTPATIENT)
Dept: NEPHROLOGY | Facility: CLINIC | Age: 52
End: 2022-11-15

## 2022-11-15 NOTE — TELEPHONE ENCOUNTER
[facility] states patient is coming back for extra treatment - regularly scheduled for Mondays. Wednesday, and Fridays third shift and will be coming in today for 4pm for additional treatment regarding fluid overload. Please call. Thank you.

## 2022-11-21 ENCOUNTER — TELEPHONE (OUTPATIENT)
Dept: INTERNAL MEDICINE CLINIC | Facility: CLINIC | Age: 52
End: 2022-11-21

## 2022-11-21 ENCOUNTER — LAB ENCOUNTER (OUTPATIENT)
Dept: LAB | Age: 52
End: 2022-11-21
Attending: EMERGENCY MEDICINE
Payer: COMMERCIAL

## 2022-11-21 ENCOUNTER — HOSPITAL ENCOUNTER (OUTPATIENT)
Age: 52
Discharge: HOME | End: 2022-11-21
Attending: EMERGENCY MEDICINE
Payer: COMMERCIAL

## 2022-11-21 VITALS
HEART RATE: 100 BPM | DIASTOLIC BLOOD PRESSURE: 79 MMHG | TEMPERATURE: 100 F | SYSTOLIC BLOOD PRESSURE: 131 MMHG | OXYGEN SATURATION: 99 % | RESPIRATION RATE: 22 BRPM

## 2022-11-21 DIAGNOSIS — I10 PRIMARY HYPERTENSION: ICD-10-CM

## 2022-11-21 DIAGNOSIS — E11.3291 TYPE 2 DIABETES MELLITUS WITH RIGHT EYE AFFECTED BY MILD NONPROLIFERATIVE RETINOPATHY WITHOUT MACULAR EDEMA, WITHOUT LONG-TERM CURRENT USE OF INSULIN (HCC): ICD-10-CM

## 2022-11-21 DIAGNOSIS — J11.1 INFLUENZA: Primary | ICD-10-CM

## 2022-11-21 LAB
CREAT UR-SCNC: 153 MG/DL
MICROALBUMIN UR-MCNC: 145 MG/DL
MICROALBUMIN/CREAT 24H UR-RTO: 947.7 UG/MG (ref ?–30)
POCT INFLUENZA A: POSITIVE
POCT INFLUENZA B: NEGATIVE
SARS-COV-2 RNA RESP QL NAA+PROBE: NOT DETECTED

## 2022-11-21 PROCEDURE — 3060F POS MICROALBUMINURIA REV: CPT | Performed by: INTERNAL MEDICINE

## 2022-11-21 PROCEDURE — 99213 OFFICE O/P EST LOW 20 MIN: CPT

## 2022-11-21 PROCEDURE — 82043 UR ALBUMIN QUANTITATIVE: CPT

## 2022-11-21 PROCEDURE — 87502 INFLUENZA DNA AMP PROBE: CPT | Performed by: EMERGENCY MEDICINE

## 2022-11-21 PROCEDURE — 82570 ASSAY OF URINE CREATININE: CPT

## 2022-11-21 NOTE — ED INITIAL ASSESSMENT (HPI)
C/o cough, fever, fatigue feeling of weakness x 3 days. Patient has been having episodes of diarrhea and loss of appetite. Per patient his daughter was recently ill.  Scheduled for HD today at 4 pm. C/o \"kidney pain\"

## 2022-11-21 NOTE — TELEPHONE ENCOUNTER
Action Requested: Summary for Provider     []  Critical Lab, Recommendations Needed  [] Need Additional Advice  []   FYI    []   Need Orders  [] Need Medications Sent to Pharmacy  []  Other     SUMMARY:  Pt going to IC for mild SOB, fever, diarrhea, vomiting. Pt states for three days has fever that went up to 101. Pt also reports diarrhea, vomiting and cough. Pt audibly SOB on call. Pt states has a hx of asthma and inhaler is helping. Pt denies difficulty breathing, weakness, chest pain,     Pt does report wheezing. Advised IC now and pt agrees to plan. Per pt he will go to [address] [place] [address] and has someone to drive him. Reason for call: No chief complaint on file.   Onset: Data Unavailable

## 2022-11-26 ENCOUNTER — HOSPITAL ENCOUNTER (EMERGENCY)
Facility: HOSPITAL | Age: 52
Discharge: HOME | End: 2022-11-26
Attending: EMERGENCY MEDICINE
Payer: COMMERCIAL

## 2022-11-26 ENCOUNTER — APPOINTMENT (OUTPATIENT)
Dept: GENERAL RADIOLOGY | Age: 52
End: 2022-11-26
Attending: PHYSICIAN ASSISTANT
Payer: COMMERCIAL

## 2022-11-26 ENCOUNTER — APPOINTMENT (OUTPATIENT)
Dept: CT IMAGING | Facility: HOSPITAL | Age: 52
End: 2022-11-26
Attending: EMERGENCY MEDICINE
Payer: COMMERCIAL

## 2022-11-26 ENCOUNTER — HOSPITAL ENCOUNTER (OUTPATIENT)
Age: 52
Discharge: HOME | End: 2022-11-26
Payer: COMMERCIAL

## 2022-11-26 ENCOUNTER — HOSPITAL ENCOUNTER (OUTPATIENT)
Age: 52
Discharge: ANOTHER HOSPITAL | End: 2022-11-26
Payer: COMMERCIAL

## 2022-11-26 VITALS
HEART RATE: 74 BPM | OXYGEN SATURATION: 99 % | DIASTOLIC BLOOD PRESSURE: 69 MMHG | TEMPERATURE: 98 F | RESPIRATION RATE: 20 BRPM | SYSTOLIC BLOOD PRESSURE: 106 MMHG

## 2022-11-26 VITALS
RESPIRATION RATE: 26 BRPM | HEART RATE: 88 BPM | OXYGEN SATURATION: 95 % | BODY MASS INDEX: 24 KG/M2 | DIASTOLIC BLOOD PRESSURE: 90 MMHG | WEIGHT: 165.38 LBS | SYSTOLIC BLOOD PRESSURE: 153 MMHG | TEMPERATURE: 98 F

## 2022-11-26 DIAGNOSIS — Z79.4 TYPE 2 DIABETES MELLITUS WITH HYPERGLYCEMIA, WITH LONG-TERM CURRENT USE OF INSULIN (HCC): ICD-10-CM

## 2022-11-26 DIAGNOSIS — J11.1 INFLUENZA: Primary | ICD-10-CM

## 2022-11-26 DIAGNOSIS — Z99.2 ESRD (END STAGE RENAL DISEASE) ON DIALYSIS (HCC): ICD-10-CM

## 2022-11-26 DIAGNOSIS — E11.65 TYPE 2 DIABETES MELLITUS WITH HYPERGLYCEMIA, WITH LONG-TERM CURRENT USE OF INSULIN (HCC): ICD-10-CM

## 2022-11-26 DIAGNOSIS — J45.909 ACUTE ASTHMA: ICD-10-CM

## 2022-11-26 DIAGNOSIS — N18.6 ESRD (END STAGE RENAL DISEASE) ON DIALYSIS (HCC): ICD-10-CM

## 2022-11-26 DIAGNOSIS — J20.8 ACUTE BRONCHITIS DUE TO OTHER SPECIFIED ORGANISMS: Primary | ICD-10-CM

## 2022-11-26 DIAGNOSIS — R79.89 ELEVATED D-DIMER: ICD-10-CM

## 2022-11-26 LAB
#MXD IC: 0.9 X10ˆ3/UL (ref 0.1–1)
ANION GAP SERPL CALC-SCNC: 10 MMOL/L (ref 0–18)
BASOPHILS # BLD AUTO: 0.02 X10(3) UL (ref 0–0.2)
BASOPHILS NFR BLD AUTO: 0.3 %
BUN BLD-MCNC: 40 MG/DL (ref 7–18)
BUN BLD-MCNC: 46 MG/DL (ref 7–18)
BUN/CREAT SERPL: 6.4 (ref 10–20)
CALCIUM BLD-MCNC: 8.1 MG/DL (ref 8.5–10.1)
CHLORIDE BLD-SCNC: 91 MMOL/L (ref 98–112)
CHLORIDE SERPL-SCNC: 93 MMOL/L (ref 98–112)
CO2 BLD-SCNC: 29 MMOL/L (ref 21–32)
CO2 SERPL-SCNC: 30 MMOL/L (ref 21–32)
CREAT BLD-MCNC: 7.1 MG/DL
CREAT BLD-MCNC: 7.19 MG/DL
DDIMER WHOLE BLOOD: 761 NG/ML DDU (ref ?–400)
DEPRECATED RDW RBC AUTO: 46.2 FL (ref 35.1–46.3)
EOSINOPHIL # BLD AUTO: 0.07 X10(3) UL (ref 0–0.7)
EOSINOPHIL NFR BLD AUTO: 1.1 %
ERYTHROCYTE [DISTWIDTH] IN BLOOD BY AUTOMATED COUNT: 13.5 % (ref 11–15)
GFR SERPLBLD BASED ON 1.73 SQ M-ARVRAT: 8 ML/MIN/1.73M2 (ref 60–?)
GFR SERPLBLD BASED ON 1.73 SQ M-ARVRAT: 9 ML/MIN/1.73M2 (ref 60–?)
GLUCOSE BLD-MCNC: 183 MG/DL (ref 70–99)
GLUCOSE BLD-MCNC: 216 MG/DL (ref 70–99)
HCT VFR BLD AUTO: 29.4 %
HCT VFR BLD AUTO: 32 %
HCT VFR BLD CALC: 34 %
HGB BLD-MCNC: 10.5 G/DL
HGB BLD-MCNC: 9.7 G/DL
IMM GRANULOCYTES # BLD AUTO: 0.12 X10(3) UL (ref 0–1)
IMM GRANULOCYTES NFR BLD: 1.9 %
ISTAT IONIZED CALCIUM FOR CHEM 8: 0.96 MMOL/L (ref 1.12–1.32)
LYMPHOCYTES # BLD AUTO: 0.55 X10(3) UL (ref 1–4)
LYMPHOCYTES # BLD AUTO: 1.3 X10ˆ3/UL (ref 1–4)
LYMPHOCYTES NFR BLD AUTO: 20.2 %
LYMPHOCYTES NFR BLD AUTO: 8.7 %
MCH RBC QN AUTO: 30.2 PG (ref 26–34)
MCH RBC QN AUTO: 30.8 PG (ref 26–34)
MCHC RBC AUTO-ENTMCNC: 32.8 G/DL (ref 31–37)
MCHC RBC AUTO-ENTMCNC: 33 G/DL (ref 31–37)
MCV RBC AUTO: 92 FL (ref 80–100)
MCV RBC AUTO: 93.3 FL
MIXED CELL %: 13.8 %
MONOCYTES # BLD AUTO: 0.33 X10(3) UL (ref 0.1–1)
MONOCYTES NFR BLD AUTO: 5.2 %
NEUTROPHILS # BLD AUTO: 4.1 X10ˆ3/UL (ref 1.5–7.7)
NEUTROPHILS # BLD AUTO: 5.22 X10 (3) UL (ref 1.5–7.7)
NEUTROPHILS # BLD AUTO: 5.22 X10(3) UL (ref 1.5–7.7)
NEUTROPHILS NFR BLD AUTO: 66 %
NEUTROPHILS NFR BLD AUTO: 82.8 %
OSMOLALITY SERPL CALC.SUM OF ELEC: 294 MOSM/KG (ref 275–295)
PLATELET # BLD AUTO: 267 10(3)UL (ref 150–450)
PLATELET # BLD AUTO: 287 X10ˆ3/UL (ref 150–450)
POTASSIUM BLD-SCNC: 3.1 MMOL/L (ref 3.6–5.1)
POTASSIUM SERPL-SCNC: 3.3 MMOL/L (ref 3.5–5.1)
RBC # BLD AUTO: 3.15 X10(6)UL
RBC # BLD AUTO: 3.48 X10ˆ6/UL
SODIUM BLD-SCNC: 134 MMOL/L (ref 136–145)
SODIUM SERPL-SCNC: 133 MMOL/L (ref 136–145)
TROPONIN I BLD-MCNC: 0.02 NG/ML
WBC # BLD AUTO: 6.3 X10(3) UL (ref 4–11)
WBC # BLD AUTO: 6.3 X10ˆ3/UL (ref 4–11)

## 2022-11-26 PROCEDURE — 93005 ELECTROCARDIOGRAM TRACING: CPT

## 2022-11-26 PROCEDURE — 80047 BASIC METABLC PNL IONIZED CA: CPT

## 2022-11-26 PROCEDURE — 99284 EMERGENCY DEPT VISIT MOD MDM: CPT

## 2022-11-26 PROCEDURE — 36415 COLL VENOUS BLD VENIPUNCTURE: CPT

## 2022-11-26 PROCEDURE — 85025 COMPLETE CBC W/AUTO DIFF WBC: CPT | Performed by: PHYSICIAN ASSISTANT

## 2022-11-26 PROCEDURE — 93010 ELECTROCARDIOGRAM REPORT: CPT | Performed by: PHYSICIAN ASSISTANT

## 2022-11-26 PROCEDURE — 71046 X-RAY EXAM CHEST 2 VIEWS: CPT | Performed by: PHYSICIAN ASSISTANT

## 2022-11-26 PROCEDURE — 80048 BASIC METABOLIC PNL TOTAL CA: CPT | Performed by: EMERGENCY MEDICINE

## 2022-11-26 PROCEDURE — 93010 ELECTROCARDIOGRAM REPORT: CPT

## 2022-11-26 PROCEDURE — 94640 AIRWAY INHALATION TREATMENT: CPT

## 2022-11-26 PROCEDURE — 84484 ASSAY OF TROPONIN QUANT: CPT

## 2022-11-26 PROCEDURE — 85378 FIBRIN DEGRADE SEMIQUANT: CPT | Performed by: PHYSICIAN ASSISTANT

## 2022-11-26 PROCEDURE — 99215 OFFICE O/P EST HI 40 MIN: CPT

## 2022-11-26 PROCEDURE — 71260 CT THORAX DX C+: CPT | Performed by: EMERGENCY MEDICINE

## 2022-11-26 RX ORDER — ALBUTEROL SULFATE 2.5 MG/3ML
2.5 SOLUTION RESPIRATORY (INHALATION) EVERY 4 HOURS PRN
Qty: 30 EACH | Refills: 0 | Status: SHIPPED | OUTPATIENT
Start: 2022-11-26 | End: 2022-11-29

## 2022-11-26 RX ORDER — PREDNISONE 20 MG/1
60 TABLET ORAL DAILY
Qty: 15 TABLET | Refills: 0 | Status: SHIPPED | OUTPATIENT
Start: 2022-11-26 | End: 2022-12-01

## 2022-11-26 RX ORDER — ALBUTEROL SULFATE 2.5 MG/3ML
2.5 SOLUTION RESPIRATORY (INHALATION) ONCE
Status: COMPLETED | OUTPATIENT
Start: 2022-11-26 | End: 2022-11-26

## 2022-11-26 RX ORDER — PREDNISONE 20 MG/1
40 TABLET ORAL ONCE
Status: COMPLETED | OUTPATIENT
Start: 2022-11-26 | End: 2022-11-26

## 2022-11-26 RX ORDER — IPRATROPIUM BROMIDE AND ALBUTEROL SULFATE 2.5; .5 MG/3ML; MG/3ML
3 SOLUTION RESPIRATORY (INHALATION) ONCE
Status: COMPLETED | OUTPATIENT
Start: 2022-11-26 | End: 2022-11-26

## 2022-11-26 NOTE — ED INITIAL ASSESSMENT (HPI)
Patient complains of debbie, cough, for the past week, dx with flu, sent from immediate care, received neb tx there and feels better but still has phlegm per patient

## 2022-11-26 NOTE — PROGRESS NOTES
Patient has been educated on home nebulizer usage and maintenance. Patient was provided nebulizer setup along with home nebulizer machine.      11/26/22 1536   Respiratory Therapy / Neb Tx   $ RT Standby Charge (per 15 min) 1   $ Initial Tx & Set up Charge Nebulizer   MD Order DuoNeb   Pre-Treatment Pulse 89   Pre-Treatment Respirations 12   Pre-Treatment O2 Saturation 100 %   Solution Normal saline   Flow rate 8   Delivery device Aerosol mask   Duration (minutes) 10 min   Respiratory Pattern Regular   Breath Sounds Pre-Treatment Bilateral Crackles   Breath Sounds Post-Treatment Bilateral Crackles   Post-Treatment Pulse 89   Post-Treatment Respirations 14   Post-Treatment O2 Saturation 100 %   Delivery Source Oxygen   Cough Non-productive   Sputum Amount None   Sputum Color None   Sputum Consistency None   Sputum How Obtained Cough on request   Position High fowlers   Treatment Tolerance Well   Time of treatment 0501

## 2022-11-27 LAB
ATRIAL RATE: 80 BPM
P AXIS: 53 DEGREES
P-R INTERVAL: 202 MS
Q-T INTERVAL: 416 MS
QRS DURATION: 96 MS
QTC CALCULATION (BEZET): 479 MS
R AXIS: 52 DEGREES
T AXIS: 70 DEGREES
VENTRICULAR RATE: 80 BPM

## 2022-11-29 ENCOUNTER — OFFICE VISIT (OUTPATIENT)
Dept: INTERNAL MEDICINE CLINIC | Facility: CLINIC | Age: 52
End: 2022-11-29
Payer: COMMERCIAL

## 2022-11-29 VITALS
HEIGHT: 69 IN | DIASTOLIC BLOOD PRESSURE: 67 MMHG | BODY MASS INDEX: 24.44 KG/M2 | SYSTOLIC BLOOD PRESSURE: 111 MMHG | WEIGHT: 165 LBS | HEART RATE: 92 BPM

## 2022-11-29 DIAGNOSIS — J11.1 INFLUENZA: Primary | ICD-10-CM

## 2022-11-29 DIAGNOSIS — Z99.2 ESRD (END STAGE RENAL DISEASE) ON DIALYSIS (HCC): ICD-10-CM

## 2022-11-29 DIAGNOSIS — N63.13 MASS OF LOWER OUTER QUADRANT OF RIGHT BREAST: ICD-10-CM

## 2022-11-29 DIAGNOSIS — N18.6 ESRD (END STAGE RENAL DISEASE) ON DIALYSIS (HCC): ICD-10-CM

## 2022-11-29 DIAGNOSIS — Z28.21 IMMUNIZATION NOT CARRIED OUT BECAUSE OF PATIENT REFUSAL: ICD-10-CM

## 2022-11-29 PROCEDURE — 3074F SYST BP LT 130 MM HG: CPT | Performed by: INTERNAL MEDICINE

## 2022-11-29 PROCEDURE — 3008F BODY MASS INDEX DOCD: CPT | Performed by: INTERNAL MEDICINE

## 2022-11-29 PROCEDURE — 99214 OFFICE O/P EST MOD 30 MIN: CPT | Performed by: INTERNAL MEDICINE

## 2022-11-29 PROCEDURE — 3078F DIAST BP <80 MM HG: CPT | Performed by: INTERNAL MEDICINE

## 2022-11-29 RX ORDER — ALBUTEROL SULFATE 2.5 MG/3ML
2.5 SOLUTION RESPIRATORY (INHALATION) EVERY 4 HOURS PRN
Qty: 30 EACH | Refills: 0 | Status: SHIPPED | OUTPATIENT
Start: 2022-11-29 | End: 2022-12-29

## 2022-12-01 ENCOUNTER — TELEPHONE (OUTPATIENT)
Dept: CASE MANAGEMENT | Age: 52
End: 2022-12-01

## 2022-12-01 NOTE — TELEPHONE ENCOUNTER
Dr. Triston Manzanares,    The patient has many questions regarding seeing a retinal specialist.     Please call the patient to discuss. Pended referral please review diagnosis and sign off if you agree. Thank you.   Dameon Allen

## 2022-12-05 NOTE — TELEPHONE ENCOUNTER
Spoke with patient,   Has cataract sx, needs retina specialist in- network . Injection required prior to sx  Advised patient retina specialist are outside of [facility], Insurance Is to provide information. Patient very upset that we cannot provide referral name/ coverage. States he will call his INS. Managed care phone number provided.

## 2022-12-06 ENCOUNTER — TELEPHONE (OUTPATIENT)
Dept: CASE MANAGEMENT | Age: 52
End: 2022-12-06

## 2022-12-06 DIAGNOSIS — H25.9 AGE-RELATED CATARACT OF BOTH EYES, UNSPECIFIED AGE-RELATED CATARACT TYPE: Primary | ICD-10-CM

## 2022-12-06 NOTE — TELEPHONE ENCOUNTER
Dr. Kelsy Rivas,    The patient is requesting a referral to a retina specialist, the patient would like to see Dr. Brenda Martinez. The patient needs Eylea injections prior to cataract surgery. Pended referral please review diagnosis and sign off if you agree. Thank you.   Dameon Allen

## 2022-12-08 ENCOUNTER — PATIENT MESSAGE (OUTPATIENT)
Dept: INTERNAL MEDICINE CLINIC | Facility: CLINIC | Age: 52
End: 2022-12-08

## 2022-12-15 ENCOUNTER — OFFICE VISIT (OUTPATIENT)
Dept: INTERNAL MEDICINE CLINIC | Facility: CLINIC | Age: 52
End: 2022-12-15
Payer: COMMERCIAL

## 2022-12-15 VITALS
SYSTOLIC BLOOD PRESSURE: 158 MMHG | HEART RATE: 95 BPM | HEIGHT: 69 IN | WEIGHT: 174 LBS | BODY MASS INDEX: 25.77 KG/M2 | OXYGEN SATURATION: 100 % | DIASTOLIC BLOOD PRESSURE: 90 MMHG

## 2022-12-15 DIAGNOSIS — I25.10 CORONARY ARTERY DISEASE INVOLVING NATIVE CORONARY ARTERY OF NATIVE HEART WITHOUT ANGINA PECTORIS: ICD-10-CM

## 2022-12-15 DIAGNOSIS — H25.9 SENILE CATARACT OF LEFT EYE, UNSPECIFIED AGE-RELATED CATARACT TYPE: ICD-10-CM

## 2022-12-15 DIAGNOSIS — Z01.818 PREOP EXAM FOR INTERNAL MEDICINE: Primary | ICD-10-CM

## 2022-12-15 DIAGNOSIS — Z99.2 ESRD (END STAGE RENAL DISEASE) ON DIALYSIS (HCC): ICD-10-CM

## 2022-12-15 DIAGNOSIS — N18.6 ESRD (END STAGE RENAL DISEASE) ON DIALYSIS (HCC): ICD-10-CM

## 2022-12-15 DIAGNOSIS — E11.3291 TYPE 2 DIABETES MELLITUS WITH RIGHT EYE AFFECTED BY MILD NONPROLIFERATIVE RETINOPATHY WITHOUT MACULAR EDEMA, WITHOUT LONG-TERM CURRENT USE OF INSULIN (HCC): ICD-10-CM

## 2022-12-15 PROCEDURE — 3080F DIAST BP >= 90 MM HG: CPT | Performed by: INTERNAL MEDICINE

## 2022-12-15 PROCEDURE — 3077F SYST BP >= 140 MM HG: CPT | Performed by: INTERNAL MEDICINE

## 2022-12-15 PROCEDURE — 99244 OFF/OP CNSLTJ NEW/EST MOD 40: CPT | Performed by: INTERNAL MEDICINE

## 2022-12-15 PROCEDURE — 3008F BODY MASS INDEX DOCD: CPT | Performed by: INTERNAL MEDICINE

## 2022-12-15 RX ORDER — CARVEDILOL 12.5 MG/1
12.5 TABLET ORAL 2 TIMES DAILY
COMMUNITY
Start: 2022-09-08

## 2022-12-15 RX ORDER — PREDNISOLONE ACETATE 10 MG/ML
SUSPENSION/ DROPS OPHTHALMIC
COMMUNITY
Start: 2022-12-13

## 2022-12-15 RX ORDER — KETOROLAC TROMETHAMINE 5 MG/ML
SOLUTION OPHTHALMIC
COMMUNITY
Start: 2022-12-14

## 2022-12-15 RX ORDER — OFLOXACIN 3 MG/ML
SOLUTION/ DROPS OPHTHALMIC
COMMUNITY
Start: 2022-12-13

## 2023-01-04 ENCOUNTER — LAB ENCOUNTER (OUTPATIENT)
Dept: LAB | Facility: HOSPITAL | Age: 53
End: 2023-01-04
Attending: ANESTHESIOLOGY
Payer: COMMERCIAL

## 2023-01-04 DIAGNOSIS — Z01.818 PRE-OP EVALUATION: ICD-10-CM

## 2023-01-04 DIAGNOSIS — Z01.810 PREOP CARDIOVASCULAR EXAM: Primary | ICD-10-CM

## 2023-01-04 LAB — POTASSIUM SERPL-SCNC: 4.3 MMOL/L (ref 3.5–5.1)

## 2023-01-04 PROCEDURE — 84132 ASSAY OF SERUM POTASSIUM: CPT

## 2023-01-04 PROCEDURE — 36415 COLL VENOUS BLD VENIPUNCTURE: CPT

## 2023-01-24 ENCOUNTER — OFFICE VISIT (OUTPATIENT)
Dept: INTERNAL MEDICINE CLINIC | Facility: CLINIC | Age: 53
End: 2023-01-24

## 2023-01-24 ENCOUNTER — MED REC SCAN ONLY (OUTPATIENT)
Dept: INTERNAL MEDICINE CLINIC | Facility: CLINIC | Age: 53
End: 2023-01-24

## 2023-01-24 VITALS
WEIGHT: 188 LBS | HEIGHT: 69 IN | BODY MASS INDEX: 27.85 KG/M2 | HEART RATE: 91 BPM | SYSTOLIC BLOOD PRESSURE: 136 MMHG | DIASTOLIC BLOOD PRESSURE: 72 MMHG

## 2023-01-24 DIAGNOSIS — S81.801S WOUND OF RIGHT LOWER EXTREMITY, SEQUELA: Primary | ICD-10-CM

## 2023-01-24 DIAGNOSIS — I25.10 CORONARY ARTERY DISEASE INVOLVING NATIVE CORONARY ARTERY OF NATIVE HEART WITHOUT ANGINA PECTORIS: ICD-10-CM

## 2023-01-24 DIAGNOSIS — E11.3291 TYPE 2 DIABETES MELLITUS WITH RIGHT EYE AFFECTED BY MILD NONPROLIFERATIVE RETINOPATHY WITHOUT MACULAR EDEMA, WITHOUT LONG-TERM CURRENT USE OF INSULIN (HCC): ICD-10-CM

## 2023-01-24 PROCEDURE — 3078F DIAST BP <80 MM HG: CPT | Performed by: INTERNAL MEDICINE

## 2023-01-24 PROCEDURE — 3008F BODY MASS INDEX DOCD: CPT | Performed by: INTERNAL MEDICINE

## 2023-01-24 PROCEDURE — 3075F SYST BP GE 130 - 139MM HG: CPT | Performed by: INTERNAL MEDICINE

## 2023-01-24 PROCEDURE — 99214 OFFICE O/P EST MOD 30 MIN: CPT | Performed by: INTERNAL MEDICINE

## 2023-03-01 ENCOUNTER — LAB REQUISITION (OUTPATIENT)
Dept: LAB | Facility: HOSPITAL | Age: 53
End: 2023-03-01
Payer: COMMERCIAL

## 2023-03-01 DIAGNOSIS — Z20.828 CONTACT WITH AND (SUSPECTED) EXPOSURE TO OTHER VIRAL COMMUNICABLE DISEASES: ICD-10-CM

## 2023-03-01 LAB — POTASSIUM SERPL-SCNC: 4.2 MMOL/L (ref 3.5–5.1)

## 2023-03-01 PROCEDURE — 84132 ASSAY OF SERUM POTASSIUM: CPT | Performed by: ANESTHESIOLOGY

## 2023-04-11 RX ORDER — FOLIC ACID/VIT B COMPLEX AND C 0.8 MG
TABLET ORAL
Qty: 90 TABLET | Refills: 1 | OUTPATIENT
Start: 2023-04-11

## 2023-04-13 ENCOUNTER — TELEPHONE (OUTPATIENT)
Dept: ADMINISTRATIVE | Age: 53
End: 2023-04-13

## 2023-04-13 DIAGNOSIS — N18.6 ESRD (END STAGE RENAL DISEASE) ON DIALYSIS (HCC): Primary | ICD-10-CM

## 2023-04-13 DIAGNOSIS — Z99.2 ESRD (END STAGE RENAL DISEASE) ON DIALYSIS (HCC): Primary | ICD-10-CM

## 2023-04-26 ENCOUNTER — HOSPITAL ENCOUNTER (OUTPATIENT)
Age: 53
Discharge: HOME | End: 2023-04-26
Payer: COMMERCIAL

## 2023-04-26 VITALS
DIASTOLIC BLOOD PRESSURE: 96 MMHG | OXYGEN SATURATION: 95 % | TEMPERATURE: 99 F | HEART RATE: 98 BPM | RESPIRATION RATE: 22 BRPM | SYSTOLIC BLOOD PRESSURE: 158 MMHG

## 2023-04-26 DIAGNOSIS — R53.1 WEAKNESS: Primary | ICD-10-CM

## 2023-04-26 LAB
#MXD IC: 0.5 X10ˆ3/UL (ref 0.1–1)
BUN BLD-MCNC: 61 MG/DL (ref 7–18)
CHLORIDE BLD-SCNC: 96 MMOL/L (ref 98–112)
CO2 BLD-SCNC: 25 MMOL/L (ref 21–32)
CREAT BLD-MCNC: 7.8 MG/DL
GFR SERPLBLD BASED ON 1.73 SQ M-ARVRAT: 8 ML/MIN/1.73M2 (ref 60–?)
GLUCOSE BLD-MCNC: 164 MG/DL (ref 70–99)
HCT VFR BLD AUTO: 33.2 %
HCT VFR BLD CALC: 38 %
HGB BLD-MCNC: 10.9 G/DL
ISTAT IONIZED CALCIUM FOR CHEM 8: 1.04 MMOL/L (ref 1.12–1.32)
LYMPHOCYTES # BLD AUTO: 1.3 X10ˆ3/UL (ref 1–4)
LYMPHOCYTES NFR BLD AUTO: 19.5 %
MCH RBC QN AUTO: 29.4 PG (ref 26–34)
MCHC RBC AUTO-ENTMCNC: 32.8 G/DL (ref 31–37)
MCV RBC AUTO: 89.5 FL (ref 80–100)
MIXED CELL %: 7.5 %
NEUTROPHILS # BLD AUTO: 4.8 X10ˆ3/UL (ref 1.5–7.7)
NEUTROPHILS NFR BLD AUTO: 73 %
PLATELET # BLD AUTO: 221 X10ˆ3/UL (ref 150–450)
POTASSIUM BLD-SCNC: 4.6 MMOL/L (ref 3.6–5.1)
RBC # BLD AUTO: 3.71 X10ˆ6/UL
SARS-COV-2 RNA RESP QL NAA+PROBE: NOT DETECTED
SODIUM BLD-SCNC: 134 MMOL/L (ref 136–145)
WBC # BLD AUTO: 6.6 X10ˆ3/UL (ref 4–11)

## 2023-04-26 PROCEDURE — 99214 OFFICE O/P EST MOD 30 MIN: CPT

## 2023-04-26 PROCEDURE — S0119 ONDANSETRON 4 MG: HCPCS

## 2023-04-26 PROCEDURE — 80047 BASIC METABLC PNL IONIZED CA: CPT

## 2023-04-26 PROCEDURE — 85025 COMPLETE CBC W/AUTO DIFF WBC: CPT | Performed by: PHYSICIAN ASSISTANT

## 2023-04-26 PROCEDURE — 99213 OFFICE O/P EST LOW 20 MIN: CPT

## 2023-04-26 PROCEDURE — 36415 COLL VENOUS BLD VENIPUNCTURE: CPT

## 2023-04-26 RX ORDER — ONDANSETRON 4 MG/1
4 TABLET, ORALLY DISINTEGRATING ORAL ONCE
Status: COMPLETED | OUTPATIENT
Start: 2023-04-26 | End: 2023-04-26

## 2023-04-26 NOTE — ED INITIAL ASSESSMENT (HPI)
Presents with 3 days of chills, \"dry heaving\", diarrhea, and decreased appetite. Patient is on dialysis. Last dialyzed on Monday. No fever. No dizziness but feels weak.

## 2023-05-11 RX ORDER — FUROSEMIDE 80 MG
80 TABLET ORAL 2 TIMES DAILY
Qty: 180 TABLET | Refills: 1 | Status: SHIPPED | OUTPATIENT
Start: 2023-05-11

## 2023-05-11 NOTE — TELEPHONE ENCOUNTER
Please review. Protocol failed or has no protocol.     Requested Prescriptions   Pending Prescriptions Disp Refills    FUROSEMIDE 80 MG Oral Tab [Pharmacy Med Name: FUROSEMIDE 80 MG TABLET] 180 tablet 1     Sig: TAKE 1 TABLET BY MOUTH TWICE A DAY       Hypertensive Medications Protocol Failed - 5/11/2023  1:04 AM        Failed - Last BP reading less than 140/90     BP Readings from Last 1 Encounters:  04/26/23 : (!) 158/96                Failed - EGFRCR or GFRNAA > 50     GFR Evaluation  EGFRCR: 8 , resulted on 4/26/2023            Passed - In person appointment in the past 12 or next 3 months     Recent Outpatient Visits              3 months ago [facility] right lower [facility], sequ[facility]    [facility] [address] Evander Gray, MD    Office Visit    4 months ago Preop exam for internal medicine    Trey Contreras Evander Gray, MD    Office Visit    5 months ago Influenza    [facility], [address] Evander Gray, MD    Office Visit    6 months ago Ventral hernia with obstruction and without gangrene    [facility], [address], Eugene Neff MD    Office Visit    7 months ago Uncontrolled type 2 diabetes mellitus with hyperglycemia [facility])    Dania Wood, [employer] [facility], NAZ Prabhakar    Office Visit                      Passed - CMP or BMP in past 6 months     Recent Results (from the past 4392 hour(s))   Basic Metabolic Panel (8)    Collection Time: 11/26/22  1:50 PM   Result Value Ref Range    Glucose 216 (H) 70 - 99 mg/dL    Sodium 133 (L) 136 - 145 mmol/L    Potassium 3.3 (L) 3.5 - 5.1 mmol/L    Chloride 93 (L) 98 - 112 mmol/L    CO2 30.0 21.0 - 32.0 mmol/L    Anion Gap 10 0 - 18 mmol/L    BUN 46 (H) 7 - 18 mg/dL    Creatinine 7.19 (H) 0.70 - 1.30 mg/dL    BUN/CREA Ratio 6.4 (L) 10.0 - 20.0    Calcium, Total 8.1 (L) 8.5 - 10.1 mg/dL    Calculated Osmolality 294 275 - 295 mOsm/kg    eGFR-Cr 8 (L) >=60 mL/min/1.73m2     *Note: Due to a large number of results and/or encounters for the requested time period, some results have not been displayed. A complete set of results can be found in Results Review.                  Passed - In person appointment or virtual visit in the past 6 months     Recent Outpatient Visits              3 months ago [facility] right lower extremity, sequela    [facility] [address] [facility]    Office Visit    4 months ago Preop exam for internal medicine    Mahesh Montgomery MD    Office Visit    5 months ago [facility] [address] MD    Office Visit    6 months ago Ventral hernia with obstruction and without gangrene    [facility], [address], [place], Doris Sicard, MD    Office Visit    7 months ago Uncontrolled type 2 diabetes mellitus with hyperglycemia [facility])    [address], [address], [address] [facility], [facility]    Office Visit                           Recent Outpatient Visits              3 months ago Wound of right lower extremity, sequela    CarMahesh Delgado MD    Office Visit    4 months ago Preop exam for internal medicine    Mahesh Montgomery MD    Office Visit    5 months ago Influenza    [facility], [address] MD    Office Visit    6 months ago Ventral hernia with obstruction and without gangrene    [address], [address], [place], Doris Sicard, MD    Office Visit    7 months ago Uncontrolled type 2 diabetes mellitus with hyperglycemia [facility])    [address] [place] [address], [address] [facility], [facility]    Office Visit

## 2023-06-20 ENCOUNTER — MED REC SCAN ONLY (OUTPATIENT)
Dept: INTERNAL MEDICINE CLINIC | Facility: CLINIC | Age: 53
End: 2023-06-20

## 2023-06-30 ENCOUNTER — TELEPHONE (OUTPATIENT)
Dept: INTERNAL MEDICINE CLINIC | Facility: CLINIC | Age: 53
End: 2023-06-30

## 2023-06-30 DIAGNOSIS — I25.10 CORONARY ARTERY DISEASE INVOLVING NATIVE CORONARY ARTERY OF NATIVE HEART WITHOUT ANGINA PECTORIS: ICD-10-CM

## 2023-06-30 DIAGNOSIS — E11.3291 TYPE 2 DIABETES MELLITUS WITH RIGHT EYE AFFECTED BY MILD NONPROLIFERATIVE RETINOPATHY WITHOUT MACULAR EDEMA, WITHOUT LONG-TERM CURRENT USE OF INSULIN (HCC): ICD-10-CM

## 2023-06-30 DIAGNOSIS — I10 PRIMARY HYPERTENSION: ICD-10-CM

## 2023-06-30 DIAGNOSIS — Z99.2 ESRD (END STAGE RENAL DISEASE) ON DIALYSIS (HCC): ICD-10-CM

## 2023-06-30 DIAGNOSIS — N18.6 ESRD (END STAGE RENAL DISEASE) ON DIALYSIS (HCC): ICD-10-CM

## 2023-06-30 DIAGNOSIS — Z00.00 ANNUAL PHYSICAL EXAM: Primary | ICD-10-CM

## 2023-06-30 DIAGNOSIS — E78.00 HYPERCHOLESTEROLEMIA: ICD-10-CM

## 2023-07-11 ENCOUNTER — OFFICE VISIT (OUTPATIENT)
Dept: INTERNAL MEDICINE CLINIC | Facility: CLINIC | Age: 53
End: 2023-07-11

## 2023-07-11 VITALS
SYSTOLIC BLOOD PRESSURE: 118 MMHG | WEIGHT: 187 LBS | HEART RATE: 87 BPM | DIASTOLIC BLOOD PRESSURE: 71 MMHG | BODY MASS INDEX: 27.7 KG/M2 | HEIGHT: 69 IN

## 2023-07-11 DIAGNOSIS — Z12.11 COLON CANCER SCREENING: ICD-10-CM

## 2023-07-11 DIAGNOSIS — E11.3291 TYPE 2 DIABETES MELLITUS WITH RIGHT EYE AFFECTED BY MILD NONPROLIFERATIVE RETINOPATHY WITHOUT MACULAR EDEMA, WITHOUT LONG-TERM CURRENT USE OF INSULIN (HCC): ICD-10-CM

## 2023-07-11 DIAGNOSIS — Z00.00 ANNUAL PHYSICAL EXAM: Primary | ICD-10-CM

## 2023-07-11 DIAGNOSIS — N18.6 ESRD (END STAGE RENAL DISEASE) ON DIALYSIS (HCC): ICD-10-CM

## 2023-07-11 DIAGNOSIS — I25.10 CORONARY ARTERY DISEASE INVOLVING NATIVE CORONARY ARTERY OF NATIVE HEART WITHOUT ANGINA PECTORIS: ICD-10-CM

## 2023-07-11 DIAGNOSIS — E78.00 HYPERCHOLESTEROLEMIA: ICD-10-CM

## 2023-07-11 DIAGNOSIS — I10 PRIMARY HYPERTENSION: ICD-10-CM

## 2023-07-11 DIAGNOSIS — K43.9 VENTRAL HERNIA WITHOUT OBSTRUCTION OR GANGRENE: ICD-10-CM

## 2023-07-11 DIAGNOSIS — Z99.2 ESRD (END STAGE RENAL DISEASE) ON DIALYSIS (HCC): ICD-10-CM

## 2023-07-11 PROCEDURE — 99213 OFFICE O/P EST LOW 20 MIN: CPT | Performed by: INTERNAL MEDICINE

## 2023-07-11 PROCEDURE — 99396 PREV VISIT EST AGE 40-64: CPT | Performed by: INTERNAL MEDICINE

## 2023-07-11 PROCEDURE — 3008F BODY MASS INDEX DOCD: CPT | Performed by: INTERNAL MEDICINE

## 2023-07-11 PROCEDURE — 3074F SYST BP LT 130 MM HG: CPT | Performed by: INTERNAL MEDICINE

## 2023-07-11 PROCEDURE — 3078F DIAST BP <80 MM HG: CPT | Performed by: INTERNAL MEDICINE

## 2023-07-11 RX ORDER — MIDODRINE HYDROCHLORIDE 5 MG/1
1 TABLET ORAL
COMMUNITY
Start: 2023-06-09

## 2023-07-13 ENCOUNTER — LAB ENCOUNTER (OUTPATIENT)
Dept: LAB | Facility: HOSPITAL | Age: 53
End: 2023-07-13
Attending: INTERNAL MEDICINE
Payer: COMMERCIAL

## 2023-07-13 DIAGNOSIS — N18.6 ESRD (END STAGE RENAL DISEASE) ON DIALYSIS (HCC): ICD-10-CM

## 2023-07-13 DIAGNOSIS — Z99.2 ESRD (END STAGE RENAL DISEASE) ON DIALYSIS (HCC): ICD-10-CM

## 2023-07-13 DIAGNOSIS — I25.10 CORONARY ARTERY DISEASE INVOLVING NATIVE CORONARY ARTERY OF NATIVE HEART WITHOUT ANGINA PECTORIS: ICD-10-CM

## 2023-07-13 DIAGNOSIS — Z00.00 ANNUAL PHYSICAL EXAM: ICD-10-CM

## 2023-07-13 DIAGNOSIS — E11.3291 TYPE 2 DIABETES MELLITUS WITH RIGHT EYE AFFECTED BY MILD NONPROLIFERATIVE RETINOPATHY WITHOUT MACULAR EDEMA, WITHOUT LONG-TERM CURRENT USE OF INSULIN (HCC): ICD-10-CM

## 2023-07-13 DIAGNOSIS — I10 PRIMARY HYPERTENSION: ICD-10-CM

## 2023-07-13 DIAGNOSIS — E78.00 HYPERCHOLESTEROLEMIA: ICD-10-CM

## 2023-07-13 LAB
ALBUMIN SERPL-MCNC: 3.5 G/DL (ref 3.4–5)
ALBUMIN/GLOB SERPL: 0.9 {RATIO} (ref 1–2)
ALP LIVER SERPL-CCNC: 84 U/L
ALT SERPL-CCNC: 21 U/L
ANION GAP SERPL CALC-SCNC: 7 MMOL/L (ref 0–18)
AST SERPL-CCNC: 19 U/L (ref 15–37)
BASOPHILS # BLD AUTO: 0.07 X10(3) UL (ref 0–0.2)
BASOPHILS NFR BLD AUTO: 0.8 %
BILIRUB SERPL-MCNC: 0.4 MG/DL (ref 0.1–2)
BUN BLD-MCNC: 40 MG/DL (ref 7–18)
BUN/CREAT SERPL: 7.8 (ref 10–20)
CALCIUM BLD-MCNC: 8.4 MG/DL (ref 8.5–10.1)
CHLORIDE SERPL-SCNC: 98 MMOL/L (ref 98–112)
CHOLEST SERPL-MCNC: 102 MG/DL (ref ?–200)
CO2 SERPL-SCNC: 31 MMOL/L (ref 21–32)
CREAT BLD-MCNC: 5.16 MG/DL
CREAT UR-SCNC: 69.7 MG/DL
DEPRECATED RDW RBC AUTO: 44.4 FL (ref 35.1–46.3)
EOSINOPHIL # BLD AUTO: 0.49 X10(3) UL (ref 0–0.7)
EOSINOPHIL NFR BLD AUTO: 5.4 %
ERYTHROCYTE [DISTWIDTH] IN BLOOD BY AUTOMATED COUNT: 12.8 % (ref 11–15)
EST. AVERAGE GLUCOSE BLD GHB EST-MCNC: 154 MG/DL (ref 68–126)
FASTING PATIENT LIPID ANSWER: YES
FASTING STATUS PATIENT QL REPORTED: YES
GFR SERPLBLD BASED ON 1.73 SQ M-ARVRAT: 12 ML/MIN/1.73M2 (ref 60–?)
GLOBULIN PLAS-MCNC: 3.7 G/DL (ref 2.8–4.4)
GLUCOSE BLD-MCNC: 140 MG/DL (ref 70–99)
HBA1C MFR BLD: 7 % (ref ?–5.7)
HCT VFR BLD AUTO: 35 %
HDLC SERPL-MCNC: 37 MG/DL (ref 40–59)
HGB BLD-MCNC: 11.4 G/DL
IMM GRANULOCYTES # BLD AUTO: 0.03 X10(3) UL (ref 0–1)
IMM GRANULOCYTES NFR BLD: 0.3 %
LDLC SERPL CALC-MCNC: 35 MG/DL (ref ?–100)
LYMPHOCYTES # BLD AUTO: 1.67 X10(3) UL (ref 1–4)
LYMPHOCYTES NFR BLD AUTO: 18.3 %
MCH RBC QN AUTO: 30.7 PG (ref 26–34)
MCHC RBC AUTO-ENTMCNC: 32.6 G/DL (ref 31–37)
MCV RBC AUTO: 94.3 FL
MICROALBUMIN UR-MCNC: 48.8 MG/DL
MICROALBUMIN/CREAT 24H UR-RTO: 700.1 UG/MG (ref ?–30)
MONOCYTES # BLD AUTO: 0.72 X10(3) UL (ref 0.1–1)
MONOCYTES NFR BLD AUTO: 7.9 %
NEUTROPHILS # BLD AUTO: 6.15 X10 (3) UL (ref 1.5–7.7)
NEUTROPHILS # BLD AUTO: 6.15 X10(3) UL (ref 1.5–7.7)
NEUTROPHILS NFR BLD AUTO: 67.3 %
NONHDLC SERPL-MCNC: 65 MG/DL (ref ?–130)
OSMOLALITY SERPL CALC.SUM OF ELEC: 294 MOSM/KG (ref 275–295)
PLATELET # BLD AUTO: 196 10(3)UL (ref 150–450)
POTASSIUM SERPL-SCNC: 4.6 MMOL/L (ref 3.5–5.1)
PROT SERPL-MCNC: 7.2 G/DL (ref 6.4–8.2)
PSA SERPL-MCNC: 0.11 NG/ML (ref ?–4)
RBC # BLD AUTO: 3.71 X10(6)UL
SODIUM SERPL-SCNC: 136 MMOL/L (ref 136–145)
TRIGL SERPL-MCNC: 185 MG/DL (ref 30–149)
TSI SER-ACNC: 2.96 MIU/ML (ref 0.36–3.74)
VLDLC SERPL CALC-MCNC: 25 MG/DL (ref 0–30)
WBC # BLD AUTO: 9.1 X10(3) UL (ref 4–11)

## 2023-07-13 PROCEDURE — 85025 COMPLETE CBC W/AUTO DIFF WBC: CPT

## 2023-07-13 PROCEDURE — 36415 COLL VENOUS BLD VENIPUNCTURE: CPT

## 2023-07-13 PROCEDURE — 3060F POS MICROALBUMINURIA REV: CPT | Performed by: INTERNAL MEDICINE

## 2023-07-13 PROCEDURE — 84443 ASSAY THYROID STIM HORMONE: CPT

## 2023-07-13 PROCEDURE — 3051F HG A1C>EQUAL 7.0%<8.0%: CPT | Performed by: INTERNAL MEDICINE

## 2023-07-13 PROCEDURE — 84153 ASSAY OF PSA TOTAL: CPT

## 2023-07-13 PROCEDURE — 80053 COMPREHEN METABOLIC PANEL: CPT

## 2023-07-13 PROCEDURE — 80061 LIPID PANEL: CPT

## 2023-07-13 PROCEDURE — 83036 HEMOGLOBIN GLYCOSYLATED A1C: CPT

## 2023-07-13 PROCEDURE — 82570 ASSAY OF URINE CREATININE: CPT

## 2023-07-13 PROCEDURE — 82043 UR ALBUMIN QUANTITATIVE: CPT

## 2023-07-14 DIAGNOSIS — E11.3291 TYPE 2 DIABETES MELLITUS WITH RIGHT EYE AFFECTED BY MILD NONPROLIFERATIVE RETINOPATHY WITHOUT MACULAR EDEMA, WITHOUT LONG-TERM CURRENT USE OF INSULIN (HCC): Primary | ICD-10-CM

## 2023-07-14 RX ORDER — GLIPIZIDE 10 MG/1
10 TABLET ORAL
Qty: 180 TABLET | Refills: 0 | Status: SHIPPED | OUTPATIENT
Start: 2023-07-14

## 2023-09-13 RX ORDER — FOLIC ACID/VIT B COMPLEX AND C 0.8 MG
1 TABLET ORAL DAILY
Qty: 90 TABLET | Refills: 0 | Status: SHIPPED | OUTPATIENT
Start: 2023-09-13

## 2023-09-13 RX ORDER — MIDODRINE HYDROCHLORIDE 5 MG/1
TABLET ORAL
Qty: 90 TABLET | Refills: 1 | Status: SHIPPED | OUTPATIENT
Start: 2023-09-13

## 2023-10-12 DIAGNOSIS — E11.3291 TYPE 2 DIABETES MELLITUS WITH RIGHT EYE AFFECTED BY MILD NONPROLIFERATIVE RETINOPATHY WITHOUT MACULAR EDEMA, WITHOUT LONG-TERM CURRENT USE OF INSULIN (HCC): ICD-10-CM

## 2023-10-14 RX ORDER — GLIPIZIDE 10 MG/1
10 TABLET ORAL
Qty: 180 TABLET | Refills: 3 | Status: SHIPPED | OUTPATIENT
Start: 2023-10-14

## 2023-10-16 ENCOUNTER — HOSPITAL ENCOUNTER (EMERGENCY)
Facility: HOSPITAL | Age: 53
Discharge: HOME | End: 2023-10-16
Attending: EMERGENCY MEDICINE
Payer: COMMERCIAL

## 2023-10-16 ENCOUNTER — PATIENT OUTREACH (OUTPATIENT)
Dept: CASE MANAGEMENT | Age: 53
End: 2023-10-16

## 2023-10-16 VITALS
RESPIRATION RATE: 16 BRPM | HEART RATE: 96 BPM | DIASTOLIC BLOOD PRESSURE: 71 MMHG | SYSTOLIC BLOOD PRESSURE: 122 MMHG | WEIGHT: 191.81 LBS | BODY MASS INDEX: 28 KG/M2 | TEMPERATURE: 98 F | OXYGEN SATURATION: 95 %

## 2023-10-16 DIAGNOSIS — K52.9 GASTROENTERITIS: Primary | ICD-10-CM

## 2023-10-16 DIAGNOSIS — J11.1 INFLUENZA: ICD-10-CM

## 2023-10-16 DIAGNOSIS — N18.9 ACUTE RENAL FAILURE SUPERIMPOSED ON CHRONIC KIDNEY DISEASE, UNSPECIFIED ACUTE RENAL FAILURE TYPE, UNSPECIFIED CKD STAGE: ICD-10-CM

## 2023-10-16 DIAGNOSIS — N17.9 ACUTE RENAL FAILURE SUPERIMPOSED ON CHRONIC KIDNEY DISEASE, UNSPECIFIED ACUTE RENAL FAILURE TYPE, UNSPECIFIED CKD STAGE: ICD-10-CM

## 2023-10-16 LAB
ALBUMIN SERPL-MCNC: 3.8 G/DL (ref 3.4–5)
ALP LIVER SERPL-CCNC: 87 U/L
ALT SERPL-CCNC: 22 U/L
ANION GAP SERPL CALC-SCNC: 15 MMOL/L (ref 0–18)
AST SERPL-CCNC: 20 U/L (ref 15–37)
BASOPHILS # BLD AUTO: 0.01 X10(3) UL (ref 0–0.2)
BASOPHILS NFR BLD AUTO: 0.1 %
BILIRUB DIRECT SERPL-MCNC: 0.2 MG/DL (ref 0–0.2)
BILIRUB SERPL-MCNC: 0.6 MG/DL (ref 0.1–2)
BUN BLD-MCNC: 109 MG/DL (ref 7–18)
BUN/CREAT SERPL: 11.3 (ref 10–20)
CALCIUM BLD-MCNC: 7.8 MG/DL (ref 8.5–10.1)
CHLORIDE SERPL-SCNC: 101 MMOL/L (ref 98–112)
CO2 SERPL-SCNC: 22 MMOL/L (ref 21–32)
CREAT BLD-MCNC: 9.66 MG/DL
DEPRECATED RDW RBC AUTO: 50.8 FL (ref 35.1–46.3)
EGFRCR SERPLBLD CKD-EPI 2021: 6 ML/MIN/1.73M2 (ref 60–?)
EOSINOPHIL # BLD AUTO: 0.01 X10(3) UL (ref 0–0.7)
EOSINOPHIL NFR BLD AUTO: 0.1 %
ERYTHROCYTE [DISTWIDTH] IN BLOOD BY AUTOMATED COUNT: 14.8 % (ref 11–15)
GLUCOSE BLD-MCNC: 250 MG/DL (ref 70–99)
HCT VFR BLD AUTO: 34 %
HGB BLD-MCNC: 11.3 G/DL
IMM GRANULOCYTES # BLD AUTO: 0.09 X10(3) UL (ref 0–1)
IMM GRANULOCYTES NFR BLD: 0.7 %
LIPASE SERPL-CCNC: 118 U/L (ref 13–75)
LYMPHOCYTES # BLD AUTO: 0.3 X10(3) UL (ref 1–4)
LYMPHOCYTES NFR BLD AUTO: 2.2 %
MCH RBC QN AUTO: 31 PG (ref 26–34)
MCHC RBC AUTO-ENTMCNC: 33.2 G/DL (ref 31–37)
MCV RBC AUTO: 93.4 FL
MONOCYTES # BLD AUTO: 0.38 X10(3) UL (ref 0.1–1)
MONOCYTES NFR BLD AUTO: 2.8 %
NEUTROPHILS # BLD AUTO: 12.8 X10 (3) UL (ref 1.5–7.7)
NEUTROPHILS # BLD AUTO: 12.8 X10(3) UL (ref 1.5–7.7)
NEUTROPHILS NFR BLD AUTO: 94.1 %
OSMOLALITY SERPL CALC.SUM OF ELEC: 329 MOSM/KG (ref 275–295)
PLATELET # BLD AUTO: 196 10(3)UL (ref 150–450)
POTASSIUM SERPL-SCNC: 4.3 MMOL/L (ref 3.5–5.1)
PROT SERPL-MCNC: 8.1 G/DL (ref 6.4–8.2)
RBC # BLD AUTO: 3.64 X10(6)UL
SODIUM SERPL-SCNC: 138 MMOL/L (ref 136–145)
WBC # BLD AUTO: 13.6 X10(3) UL (ref 4–11)

## 2023-10-16 PROCEDURE — 96374 THER/PROPH/DIAG INJ IV PUSH: CPT

## 2023-10-16 PROCEDURE — 99284 EMERGENCY DEPT VISIT MOD MDM: CPT

## 2023-10-16 PROCEDURE — 80076 HEPATIC FUNCTION PANEL: CPT | Performed by: EMERGENCY MEDICINE

## 2023-10-16 PROCEDURE — 85025 COMPLETE CBC W/AUTO DIFF WBC: CPT | Performed by: EMERGENCY MEDICINE

## 2023-10-16 PROCEDURE — 96361 HYDRATE IV INFUSION ADD-ON: CPT

## 2023-10-16 PROCEDURE — 83690 ASSAY OF LIPASE: CPT | Performed by: EMERGENCY MEDICINE

## 2023-10-16 PROCEDURE — 80048 BASIC METABOLIC PNL TOTAL CA: CPT | Performed by: EMERGENCY MEDICINE

## 2023-10-16 RX ORDER — METOCLOPRAMIDE 10 MG/1
10 TABLET ORAL 3 TIMES DAILY PRN
Qty: 14 TABLET | Refills: 0 | Status: SHIPPED | OUTPATIENT
Start: 2023-10-16 | End: 2023-11-15

## 2023-10-16 RX ORDER — ONDANSETRON 2 MG/ML
4 INJECTION INTRAMUSCULAR; INTRAVENOUS ONCE
Status: COMPLETED | OUTPATIENT
Start: 2023-10-16 | End: 2023-10-16

## 2023-10-17 RX ORDER — ALBUTEROL SULFATE 2.5 MG/3ML
2.5 SOLUTION RESPIRATORY (INHALATION) EVERY 4 HOURS PRN
Qty: 75 ML | Refills: 0 | Status: SHIPPED | OUTPATIENT
Start: 2023-10-17

## 2023-10-17 NOTE — TELEPHONE ENCOUNTER
Refill passed per [address] protocol.   Requested Prescriptions   Pending Prescriptions Disp Refills    ALBUTEROL (2.5 MG/3ML) 0.083% Inhalation Nebu Soln [Pharmacy Med Name: ALBUTEROL SUL 2.5 MG/3 ML SOLN] 75 mL 0     Sig: USE 3 ML (2.5 MG TOTAL) BY NEBULIZATION EVERY 4 HOURS AS NEEDED FOR WHEEZING OR SHORTNESS OF BREATH       Asthma & COPD Medication Protocol Passed - 10/16/2023  5:58 PM        Passed - In person appointment or virtual visit in the past 6 mos or appointment in next 3 mos     Recent Outpatient Visits              3 months ago Annual physical exam    Mahesh Walker MD    Office Visit    8 months ago Wound of right lower extremity, Mahesh Loyola MD    Office Visit    10 months ago Preop exam for internal medicine    Mahesh Walker MD    Office Visit    10 months ago Influenza    Trey Walker Ericka Breech, MD    Office Visit    12 months ago Ventral hernia with obstruction and without gangrene    Ashley Alvarez MD    Office Visit          Future Appointments         Provider Department Appt Notes    In 1 week MD [employer] [facility] Elian [facility] f/up    In 2 months MD Chase [employer] Mahesh Mcconnell 6M fu                         Recent Outpatient Visits              3 months ago Annual physical exam    Mahesh Walker MD    Office Visit    8 months ago Wound of right lower extremity, Mahesh Loyola MD    Office Visit    10 months ago Preop exam for internal medicine    [employer], Trey Mcconnell, Rae Gandhi MD    Office Visit    10 months ago Influenza    Ofelia Danielson MD    Office Visit    12 months ago Ventral hernia with obstruction and without gangrene    Nitin Avila, Bria Duran MD    Office Visit          Future Appointments         Provider Department Appt Notes    In 1 week Griffin Kelly MD [address] [facility] [address] [place], [address] [facility] ER f/up    In 2 months Griffin Kelly MD [facility], [address] [facility]

## 2023-10-17 NOTE — PROGRESS NOTES
2nd attempt ER f/up apt request    Naldo Sommer  PCP  [address]  [phone]  Apt: Oct 24 @6pm     Pt needs apt after 6pm     Confirmed w/ pt  Closing encounter

## 2023-10-24 ENCOUNTER — OFFICE VISIT (OUTPATIENT)
Dept: INTERNAL MEDICINE CLINIC | Facility: CLINIC | Age: 53
End: 2023-10-24

## 2023-10-24 VITALS
HEART RATE: 85 BPM | WEIGHT: 194 LBS | HEIGHT: 69 IN | OXYGEN SATURATION: 95 % | BODY MASS INDEX: 28.73 KG/M2 | DIASTOLIC BLOOD PRESSURE: 64 MMHG | SYSTOLIC BLOOD PRESSURE: 111 MMHG | RESPIRATION RATE: 14 BRPM

## 2023-10-24 DIAGNOSIS — I25.10 CORONARY ARTERY DISEASE INVOLVING NATIVE CORONARY ARTERY OF NATIVE HEART WITHOUT ANGINA PECTORIS: ICD-10-CM

## 2023-10-24 DIAGNOSIS — K52.9 GASTROENTERITIS: Primary | ICD-10-CM

## 2023-10-24 DIAGNOSIS — I10 PRIMARY HYPERTENSION: ICD-10-CM

## 2023-10-24 DIAGNOSIS — Z28.21 IMMUNIZATION NOT CARRIED OUT BECAUSE OF PATIENT REFUSAL: ICD-10-CM

## 2023-10-24 PROCEDURE — 3074F SYST BP LT 130 MM HG: CPT | Performed by: INTERNAL MEDICINE

## 2023-10-24 PROCEDURE — 3078F DIAST BP <80 MM HG: CPT | Performed by: INTERNAL MEDICINE

## 2023-10-24 PROCEDURE — 3008F BODY MASS INDEX DOCD: CPT | Performed by: INTERNAL MEDICINE

## 2023-10-24 PROCEDURE — 99214 OFFICE O/P EST MOD 30 MIN: CPT | Performed by: INTERNAL MEDICINE

## 2023-10-24 RX ORDER — FUROSEMIDE 80 MG
80 TABLET ORAL 2 TIMES DAILY
Qty: 180 TABLET | Refills: 3 | Status: SHIPPED | OUTPATIENT
Start: 2023-10-24

## 2023-10-24 RX ORDER — CARVEDILOL 12.5 MG/1
12.5 TABLET ORAL 2 TIMES DAILY
Qty: 90 TABLET | Refills: 3 | Status: SHIPPED | OUTPATIENT
Start: 2023-10-24

## 2023-12-20 ENCOUNTER — OFFICE VISIT (OUTPATIENT)
Dept: SURGERY | Facility: CLINIC | Age: 53
End: 2023-12-20
Payer: COMMERCIAL

## 2023-12-20 VITALS — WEIGHT: 196 LBS | BODY MASS INDEX: 29.03 KG/M2 | HEIGHT: 69 IN

## 2023-12-20 DIAGNOSIS — K43.9 VENTRAL HERNIA WITHOUT OBSTRUCTION OR GANGRENE: Primary | ICD-10-CM

## 2023-12-20 NOTE — PATIENT INSTRUCTIONS
Obtain cardiac clearance.   You will need to stop all anticoagulation and aspirin 5 days before your surgery    Obtain your preoperative labs and EKG

## 2023-12-20 NOTE — H&P (VIEW-ONLY)
History and Physical      HPI     Chief Complaint   Patient presents with    Referral     Referral from Dr. Jaycob Aleman for Umbilical Hernia.  Patient denies pain.  Patient reports he has end stage renal disease.  Patient is pending transplant and on Renal dialysis three times per week.  Patient had cardi stent placement about two years ago.         HPI  Shane Hawthorne is a 53 year old male who presents with a ventral hernia measuring 8 cm in size.  He was seen by Dr. Collins in the past.  Currently desires repair having more symptoms and wants to be on the transplant list.    Past Medical History:   Diagnosis Date    Allergic rhinitis 1/1/2000    Asthma     Atherosclerosis of coronary artery     Colon polyps     Diabetes (HCC) 7/1/1995    Diabetes mellitus, type II ([facility])     End stage renal disease on dialysis ([facility]) 04/2022    Essential hypertension     Hernia, abdominal     Hyperlipidemia     Hypertension     Macular edema     multiple Eylea injections- Dr. Marquis    Obesity 1/1/1984    Renal disorder      Past Surgical History:   Procedure Laterality Date    APPENDECTOMY  1974    CATARACT  11/1/2022    COLONOSCOPY  11/30/2021    COLONOSCOPY  2022    CORONARY STENT PLACEMENT  08/09/2022    x 2, Dr. Narayan    EYE SURGERY Bilateral     HAND/FINGER SURGERY UNLISTED Right 2018    ORIF    IR TUNNELED DIALYSIS CATHETER  04/01/2022    TOE SURGERY Right     great toe    TONSILLECTOMY  1978     Current Outpatient Medications   Medication Sig Dispense Refill    carvedilol 12.5 MG Oral Tab Take 1 tablet (12.5 mg total) by mouth 2 (two) times daily. 90 tablet 3    furosemide 80 MG Oral Tab Take 1 tablet (80 mg total) by mouth 2 (two) times daily. 180 tablet 3    albuterol (2.5 MG/3ML) 0.083% Inhalation Nebu Soln Take 3 mL (2.5 mg total) by nebulization every 4 (four) hours as needed for Wheezing or Shortness of Breath. 75 mL 0    glipiZIDE 10 MG Oral Tab Take 1 tablet (10 mg total) by mouth 2 (two) times daily before meals.  180 tablet 3    NEPHRO-CARLI 0.8 MG Oral Tab TAKE 1 TABLET BY MOUTH DAILY. 90 tablet 0    MIDODRINE 5 MG Oral Tab TAKE 1 TABLET BY MOUTH 1 HOUR BEFORE THE END OF DIALYSIS 90 tablet 1    ketorolac 0.5 % Ophthalmic Solution  (Patient not taking: Reported on 10/24/2023)      ALBUTEROL 108 (90 Base) MCG/ACT Inhalation Aero Soln INHALE 2 PUFFS INTO THE LUNGS EVERY 6 HOURS AS NEEDED FOR WHEEZING FOR UP TO 30 DAYS. 8.5 each 3    atorvastatin 40 MG Oral Tab Take 1 tablet (40 mg total) by mouth nightly. 30 tablet 2    aspirin 81 MG Oral Tab EC Take 1 tablet (81 mg total) by mouth daily. 30 tablet 2    ticagrelor 90 MG Oral Tab Take 1 tablet (90 mg total) by mouth 2 (two) times daily. (Patient not taking: Reported on 10/24/2023) 60 tablet 2    Sevelamer 800 MG Oral Tab Take 1 tablet (800 mg total) by mouth 3 (three) times daily.       ALLERGIES  No Known Allergies    Social History     Socioeconomic History    Marital status:     Number of children: 3   Occupational History    Occupation: [employer]    Tobacco Use    Smoking status: Former    Smokeless tobacco: Never    Tobacco comments:     Quit due to asthma   Vaping Use    Vaping Use: Never used   Substance and Sexual Activity    Alcohol use: Not Currently     Alcohol/week: 2.0 standard drinks of alcohol     Types: 2 Cans of beer per week    Drug use: Yes     Frequency: 7.0 times per week     Types: Cannabis     Comment: Smoke marijuana to stimulate appetite     Family History   Problem Relation Age of Onset    Diabetes Maternal Grandmother     Heart Disorder Mother     Asthma Mother     Glaucoma Neg     Macular degeneration Neg        Review of Systems   A comprehensive 10 point review of systems was completed.  Pertinent positives and negatives noted in the the HPI.    PHYSICAL EXAM   Ht 5' 9\" (1.753 m)   Wt 196 lb (88.9 kg)   BMI 28.94 kg/m²  No LMP for male patient.   Constitutional: appears well hydrated alert and responsive no acute  distress noted  Head/Face: normocephalic  Nose/Mouth/Throat: nose and throat are clear palate is intact mucous membranes are moist no oral lesions are noted  Neck/Thyroid: neck is supple without adenopathy  Respiratory: normal to inspection lungs are clear to auscultation bilaterally normal respiratory effort  Cardiovascular: regular rate and rhythm no murmurs, gallups, or rubs  Abdomen: soft non-tender non-distended no organomegaly noted no masses  Upper abdominal ventral hernia to the left of the supraumbilical area 8 cm in size  Extremities: no edema, cyanosis, or clubbing  Neurological: exam appropriate for age reflexes and motor skills appropriate for age      ASSESSMENT/PLAN   Assessment   Encounter Diagnosis   Name Primary?    Ventral hernia without obstruction or gangrene Yes       53 year old male with 8 cm ventral hernia with incarcerated contents  We have discussed the surgical risks, benefits, alternatives, and expected recovery. We will plan laparoscopic ventral hernia repair with mesh.  Patient will need cardiac clearance.  Will need to be off all anticoagulation. All of the patient's questions have been answered to his satisfaction.       12/20/2023  Pelon Gomes MD

## 2023-12-21 ENCOUNTER — TELEPHONE (OUTPATIENT)
Dept: SURGERY | Facility: CLINIC | Age: 53
End: 2023-12-21

## 2023-12-21 DIAGNOSIS — K43.9 VENTRAL HERNIA WITHOUT OBSTRUCTION OR GANGRENE: Primary | ICD-10-CM

## 2023-12-21 NOTE — TELEPHONE ENCOUNTER
Phone call to patient and he is at [address] office now and cardi clearance will be faxed to [facility]. Fax number provided.    Julienne Cueva

## 2023-12-22 RX ORDER — FOLIC ACID/VIT B COMPLEX AND C 0.8 MG
1 TABLET ORAL DAILY
Qty: 90 TABLET | Refills: 0 | Status: SHIPPED | OUTPATIENT
Start: 2023-12-22

## 2024-01-05 ENCOUNTER — TELEPHONE (OUTPATIENT)
Dept: INTERNAL MEDICINE CLINIC | Facility: CLINIC | Age: 54
End: 2024-01-05

## 2024-01-05 NOTE — TELEPHONE ENCOUNTER
Patient called and said he is at the labs right now because he was told there would be labs from him, patient is requesting lab orders to be sent so he can get them done, I advised it may not be ready today labs for maynor ahuja

## 2024-01-08 NOTE — TELEPHONE ENCOUNTER
Please confirm with patient if he needs a preoperative clearance for his upcoming surgery.  If so, then he will need a 30  minute appointment for this.

## 2024-01-09 ENCOUNTER — OFFICE VISIT (OUTPATIENT)
Dept: INTERNAL MEDICINE CLINIC | Facility: CLINIC | Age: 54
End: 2024-01-09
Payer: COMMERCIAL

## 2024-01-09 VITALS
SYSTOLIC BLOOD PRESSURE: 106 MMHG | BODY MASS INDEX: 29.03 KG/M2 | HEIGHT: 69 IN | DIASTOLIC BLOOD PRESSURE: 67 MMHG | HEART RATE: 90 BPM | RESPIRATION RATE: 17 BRPM | WEIGHT: 196 LBS

## 2024-01-09 DIAGNOSIS — I25.10 CORONARY ARTERY DISEASE INVOLVING NATIVE CORONARY ARTERY OF NATIVE HEART WITHOUT ANGINA PECTORIS: ICD-10-CM

## 2024-01-09 DIAGNOSIS — Z99.2 ESRD (END STAGE RENAL DISEASE) ON DIALYSIS (HCC): ICD-10-CM

## 2024-01-09 DIAGNOSIS — N18.6 ESRD (END STAGE RENAL DISEASE) ON DIALYSIS (HCC): ICD-10-CM

## 2024-01-09 DIAGNOSIS — E11.3291 TYPE 2 DIABETES MELLITUS WITH RIGHT EYE AFFECTED BY MILD NONPROLIFERATIVE RETINOPATHY WITHOUT MACULAR EDEMA, WITHOUT LONG-TERM CURRENT USE OF INSULIN (HCC): ICD-10-CM

## 2024-01-09 DIAGNOSIS — K43.9 VENTRAL HERNIA WITHOUT OBSTRUCTION OR GANGRENE: ICD-10-CM

## 2024-01-09 DIAGNOSIS — Z01.818 PREOP EXAM FOR INTERNAL MEDICINE: Primary | ICD-10-CM

## 2024-01-09 PROCEDURE — 3078F DIAST BP <80 MM HG: CPT | Performed by: INTERNAL MEDICINE

## 2024-01-09 PROCEDURE — 3074F SYST BP LT 130 MM HG: CPT | Performed by: INTERNAL MEDICINE

## 2024-01-09 PROCEDURE — 3008F BODY MASS INDEX DOCD: CPT | Performed by: INTERNAL MEDICINE

## 2024-01-09 PROCEDURE — 99214 OFFICE O/P EST MOD 30 MIN: CPT | Performed by: INTERNAL MEDICINE

## 2024-01-09 NOTE — TELEPHONE ENCOUNTER
Dr. Gomes states he needs cardiac clearance as well as clearance from PCP.  Let patient know so he can schedule immediately.  He will also need to be off his Brilinta for 5 days which will need to go through his surgeon.

## 2024-01-09 NOTE — TELEPHONE ENCOUNTER
Talked to patient told him that he needs a 30 minutes appointment for pre-op but he says that he can not do that any more, patient says that he will come earlier and if any problem he would like to talk to Dr Aleman himself. Told patient that it's up to doctor's discretion and he understood.

## 2024-01-09 NOTE — TELEPHONE ENCOUNTER
Spoke with patient, verified name/, gave message from Dr Aleman.  Patient states he already saw cardiologist at [facility] 2 weeks ago and got clearance.  Updated Care Everywhere, seen by Dr Santos  He states he has been off Brilinta for months.  He has a 15 minute appointment today at 6:30 pm.  Surgery is scheduled for 2024.  Patient asking if he can come for PCP pre op today at 6:30?  Dr Aleman, please advise?    Dr Santos's note:    ASSESSMENT    In conclusion this a pleasant 53-year-old diabetic with hypertension elevated cholesterol renal failure on dialysis who is status post LAD and diagonal PCI in 2022 and feels well. Based on history is medically stable and low risk for hernia surgery. May hold aspirin for 5 days and resume after procedure. His blood pressure is controlled. Cholesterols are going to be checked with primary by July. Other test at this time. He will call with changes. Medically stable and low risk for hernia surgeryFasting cholesterol blood test with AST and ALT in July if not done by primaryFollow-up 1 year This note used Dragon technology. Transcription errors are not uncommon and may not have been corrected prior to electronically signing the note. Should you find these errors please consult the clinician for interpretation (or apply common sense adjustment when safe and appropriate).Increased BMI: Provide patient with information regarding diet and lifestyle changes.

## 2024-01-09 NOTE — TELEPHONE ENCOUNTER
Yes that is fine.  He can come earlier if he would like.  We will squeeze him in if he comes sooner.

## 2024-01-10 ENCOUNTER — LAB ENCOUNTER (OUTPATIENT)
Dept: LAB | Facility: HOSPITAL | Age: 54
End: 2024-01-10
Attending: SURGERY
Payer: COMMERCIAL

## 2024-01-10 ENCOUNTER — TELEMEDICINE (OUTPATIENT)
Dept: INTERNAL MEDICINE CLINIC | Facility: CLINIC | Age: 54
End: 2024-01-10
Payer: COMMERCIAL

## 2024-01-10 DIAGNOSIS — R06.83 SNORING: Primary | ICD-10-CM

## 2024-01-10 DIAGNOSIS — R06.81 APNEIC EPISODE: ICD-10-CM

## 2024-01-10 DIAGNOSIS — K43.9 VENTRAL HERNIA WITHOUT OBSTRUCTION OR GANGRENE: ICD-10-CM

## 2024-01-10 LAB
ALBUMIN SERPL-MCNC: 4.2 G/DL (ref 3.2–4.8)
ALBUMIN/GLOB SERPL: 1.3 {RATIO} (ref 1–2)
ALP LIVER SERPL-CCNC: 69 U/L
ALT SERPL-CCNC: 16 U/L
ANION GAP SERPL CALC-SCNC: 7 MMOL/L (ref 0–18)
AST SERPL-CCNC: 19 U/L (ref ?–34)
BASOPHILS # BLD AUTO: 0.03 X10(3) UL (ref 0–0.2)
BASOPHILS NFR BLD AUTO: 0.4 %
BILIRUB SERPL-MCNC: 0.5 MG/DL (ref 0.3–1.2)
BUN BLD-MCNC: 44 MG/DL (ref 9–23)
BUN/CREAT SERPL: 6.7 (ref 10–20)
CALCIUM BLD-MCNC: 9.4 MG/DL (ref 8.7–10.4)
CHLORIDE SERPL-SCNC: 104 MMOL/L (ref 98–112)
CO2 SERPL-SCNC: 27 MMOL/L (ref 21–32)
CREAT BLD-MCNC: 6.59 MG/DL
DEPRECATED RDW RBC AUTO: 51.4 FL (ref 35.1–46.3)
EGFRCR SERPLBLD CKD-EPI 2021: 9 ML/MIN/1.73M2 (ref 60–?)
EOSINOPHIL # BLD AUTO: 0.39 X10(3) UL (ref 0–0.7)
EOSINOPHIL NFR BLD AUTO: 5.2 %
ERYTHROCYTE [DISTWIDTH] IN BLOOD BY AUTOMATED COUNT: 14.8 % (ref 11–15)
FASTING STATUS PATIENT QL REPORTED: NO
GLOBULIN PLAS-MCNC: 3.3 G/DL (ref 2.8–4.4)
GLUCOSE BLD-MCNC: 74 MG/DL (ref 70–99)
HCT VFR BLD AUTO: 30.4 %
HGB BLD-MCNC: 9.6 G/DL
IMM GRANULOCYTES # BLD AUTO: 0.03 X10(3) UL (ref 0–1)
IMM GRANULOCYTES NFR BLD: 0.4 %
LYMPHOCYTES # BLD AUTO: 1.4 X10(3) UL (ref 1–4)
LYMPHOCYTES NFR BLD AUTO: 18.8 %
MCH RBC QN AUTO: 30.2 PG (ref 26–34)
MCHC RBC AUTO-ENTMCNC: 31.6 G/DL (ref 31–37)
MCV RBC AUTO: 95.6 FL
MONOCYTES # BLD AUTO: 0.67 X10(3) UL (ref 0.1–1)
MONOCYTES NFR BLD AUTO: 9 %
NEUTROPHILS # BLD AUTO: 4.94 X10 (3) UL (ref 1.5–7.7)
NEUTROPHILS # BLD AUTO: 4.94 X10(3) UL (ref 1.5–7.7)
NEUTROPHILS NFR BLD AUTO: 66.2 %
OSMOLALITY SERPL CALC.SUM OF ELEC: 296 MOSM/KG (ref 275–295)
PLATELET # BLD AUTO: 195 10(3)UL (ref 150–450)
POTASSIUM SERPL-SCNC: 4.3 MMOL/L (ref 3.5–5.1)
PROT SERPL-MCNC: 7.5 G/DL (ref 5.7–8.2)
RBC # BLD AUTO: 3.18 X10(6)UL
SODIUM SERPL-SCNC: 138 MMOL/L (ref 136–145)
WBC # BLD AUTO: 7.5 X10(3) UL (ref 4–11)

## 2024-01-10 PROCEDURE — 36415 COLL VENOUS BLD VENIPUNCTURE: CPT

## 2024-01-10 PROCEDURE — 80053 COMPREHEN METABOLIC PANEL: CPT

## 2024-01-10 PROCEDURE — 99213 OFFICE O/P EST LOW 20 MIN: CPT | Performed by: INTERNAL MEDICINE

## 2024-01-10 PROCEDURE — 85025 COMPLETE CBC W/AUTO DIFF WBC: CPT

## 2024-01-10 NOTE — PROGRESS NOTES
Patient ID: Shane Hawthorne is a 53 year old male.  Chief Complaint   Patient presents with    Sleep Problem          HISTORY OF PRESENT ILLNESS:   Patient presents for above.  This visit is conducted using Telemedicine with live, interactive video and audio.  Patient with longstanding history of snoring and apneic episodes.  States his mother was diagnosed with sleep apnea.  He does have multiple comorbidities including coronary artery disease, diabetes, hypertension.  Has never had a sleep study in the past.    Review of Systems   Constitutional: Negative.    HENT: Negative.     Eyes: Negative.    Respiratory: Negative.     Cardiovascular: Negative.    Gastrointestinal: Negative.    Endocrine: Negative.    Genitourinary: Negative.    Musculoskeletal: Negative.    Skin: Negative.    Allergic/Immunologic: Negative.    Neurological: Negative.    Hematological: Negative.    Psychiatric/Behavioral:  Positive for sleep disturbance.       MEDICAL HISTORY:     Past Medical History:   Diagnosis Date    Allergic rhinitis 1/1/2000    Asthma     Atherosclerosis of coronary artery     Colon polyps     Diabetes (HCC) 7/1/1995    Diabetes mellitus, type II ([facility])     End stage renal disease on dialysis ([facility]) 04/2022    Essential hypertension     Hernia, abdominal     Hyperlipidemia     Hypertension     Macular edema     multiple Eylea injections- Dr. Marquis    Obesity 1/1/1984    Renal disorder        Past Surgical History:   Procedure Laterality Date    APPENDECTOMY  1974    CATARACT  11/1/2022    COLONOSCOPY  11/30/2021    COLONOSCOPY  2022    CORONARY STENT PLACEMENT  08/09/2022    x 2, Dr. Narayan    EYE SURGERY Bilateral     HAND/FINGER SURGERY UNLISTED Right 2018    ORIF    IR TUNNELED DIALYSIS CATHETER  04/01/2022    TOE SURGERY Right     great toe    TONSILLECTOMY  1978         Current Outpatient Medications:     NEPHRO-CARLI 0.8 MG Oral Tab, TAKE 1 TABLET BY MOUTH EVERY DAY, Disp: 90 tablet, Rfl: 0    carvedilol 12.5 MG  Oral Tab, Take 1 tablet (12.5 mg total) by mouth 2 (two) times daily., Disp: 90 tablet, Rfl: 3    furosemide 80 MG Oral Tab, Take 1 tablet (80 mg total) by mouth 2 (two) times daily., Disp: 180 tablet, Rfl: 3    albuterol (2.5 MG/3ML) 0.083% Inhalation Nebu Soln, Take 3 mL (2.5 mg total) by nebulization every 4 (four) hours as needed for Wheezing or Shortness of Breath., Disp: 75 mL, Rfl: 0    glipiZIDE 10 MG Oral Tab, Take 1 tablet (10 mg total) by mouth 2 (two) times daily before meals., Disp: 180 tablet, Rfl: 3    MIDODRINE 5 MG Oral Tab, TAKE 1 TABLET BY MOUTH 1 HOUR BEFORE THE END OF DIALYSIS, Disp: 90 tablet, Rfl: 1    ketorolac 0.5 % Ophthalmic Solution, , Disp: , Rfl:     ALBUTEROL 108 (90 Base) MCG/ACT Inhalation Aero Soln, INHALE 2 PUFFS INTO THE LUNGS EVERY 6 HOURS AS NEEDED FOR WHEEZING FOR UP TO 30 DAYS., Disp: 8.5 each, Rfl: 3    atorvastatin 40 MG Oral Tab, Take 1 tablet (40 mg total) by mouth nightly., Disp: 30 tablet, Rfl: 2    aspirin 81 MG Oral Tab EC, Take 1 tablet (81 mg total) by mouth daily., Disp: 30 tablet, Rfl: 2    Sevelamer 800 MG Oral Tab, Take 1 tablet (800 mg total) by mouth 3 (three) times daily., Disp: , Rfl:     Allergies:No Known Allergies    Social History     Socioeconomic History    Marital status:      Spouse name: Not on file    Number of children: 3    Years of education: Not on file    Highest education level: Not on file   Occupational History    Occupation: [employer]    Tobacco Use    Smoking status: Former    Smokeless tobacco: Never    Tobacco comments:     Quit due to asthma   Vaping Use    Vaping Use: Never used   Substance and Sexual Activity    Alcohol use: Not Currently     Alcohol/week: 2.0 standard drinks of alcohol     Types: 2 Cans of beer per week    Drug use: Yes     Frequency: 7.0 times per week     Types: Cannabis     Comment: Smoke marijuana to stimulate appetite    Sexual activity: Not on file   Other Topics Concern    Not on  file   Social History Narrative    Not on file     Social Determinants of Health     Financial Resource Strain: Not on file   Food Insecurity: Not on file   Transportation Needs: Not on file   Physical Activity: Not on file   Stress: Not on file   Social Connections: Not on file   Housing Stability: Not on file       PHYSICAL EXAM:   Unable to perform vitals or do physical exam as this is a virtual video visit.  Patient appears alert.  No conversational dyspnea or distress.    ASSESSMENT/PLAN:   1. Snoring  Home Sleep Apnea Test (Adult pt only) - Sleep consult required for Medicare pts  General sleep study; Future    2. Apneic episode  Home Sleep Apnea Test (Adult pt only) - Sleep consult required for Medicare pts  General sleep study; Future    Return if symptoms worsen or fail to improve.    Time spent on encounter  11 minutes   Video time 6 minutes   Documentation time 5 minutes     Shane Hawthorne understands video evaluation is not a substitute for face-to-face examination or emergency care. Patient advised to go to ER or call 911 for worsening symptoms or acute distress.     [facility] Verbal Consent   I conducted a telehealth visit with Shane Hawthorne today, 01/10/24, which was completed using two-way, real-time interactive audio and video communication. This has been done in good rony to provide continuity of care in the best interest of the provider-patient relationship, due to the COVID -19 public health crisis/national emergency where restrictions of face-to-face office visits are ongoing. Every conscious effort was taken to allow for sufficient and adequate time to complete the visit.  The patient was made aware of the limitations of the telehealth visit, including treatment limitations as no physical exam could be performed.  The patient was advised to call 911 or to go to the ER in case there was an emergency.  The patient was also advised of the potential privacy & security  concerns related to the telehealth platform.   The patient was made aware of where to find [facility]'s notice of privacy practices, telehealth consent form and other related consent forms and documents.  which are located on the [facility] website. The patient verbally agreed to telehealth consent form, related consents and the risks discussed.    Lastly, the patient confirmed that they were in [place].   Included in this visit, time may have been spent reviewing labs, medications, radiology tests and decision making. Appropriate medical decision-making and tests are ordered as detailed in the plan of care above.  Coding/billing information is submitted for this visit based on complexity of care and/or time spent for the visit.    This note was prepared using Dragon Medical voice recognition dictation software. As a result errors may occur. When identified these errors have been corrected. While every attempt is made to correct errors during dictation discrepancies may still exist.    Jaycob Aleman MD  1/10/2024

## 2024-01-10 NOTE — PROGRESS NOTES
Patient ID: Shane Hawthorne is a 53 year old male.  Chief Complaint   Patient presents with    Pre-Op Exam     1/18, hernia repair.         HISTORY OF PRESENT ILLNESS:   HPI  Pt presents for preoperative clearance.  Planned surgery - Ventral hernia repair with mesh.  Type of anesthesia - Unknown.  Surgeon - Dr. Pelon Gomes  Date of surgery - 1/18/2024  Cardiac history - Yes (two stents placed in 2022).  Follows with cardiology who has cleared patient for upcoming surgery.  Myocardial infarction in past 6 months - No  Bleeding disorders - No  Taking blood thinners - Yes (aspirin 81mg)  Chest pain or shortness of breath with ADLs - No  Number of alcoholic beverages consumed weekly - 0  Tobacco consumption - No  Patient was recently cleared by cardiology.    Review of Systems   Constitutional: Negative.    HENT: Negative.     Eyes: Negative.    Respiratory: Negative.     Cardiovascular: Negative.    Gastrointestinal: Negative.    Endocrine: Negative.    Genitourinary: Negative.    Musculoskeletal: Negative.    Skin: Negative.    Allergic/Immunologic: Negative.    Neurological: Negative.    Hematological: Negative.    Psychiatric/Behavioral: Negative.       MEDICAL HISTORY:     Past Medical History:   Diagnosis Date    Allergic rhinitis 1/1/2000    Asthma     Atherosclerosis of coronary artery     Colon polyps     Diabetes (HCC) 7/1/1995    Diabetes mellitus, type II (HCC)     End stage renal disease on dialysis ([facility]) 04/2022    Essential hypertension     Hernia, abdominal     Hyperlipidemia     Hypertension     Macular edema     multiple Eylea injections- Dr. Marquis    Obesity 1/1/1984    Renal disorder        Past Surgical History:   Procedure Laterality Date    APPENDECTOMY  1974    CATARACT  11/1/2022    COLONOSCOPY  11/30/2021    COLONOSCOPY  2022    CORONARY STENT PLACEMENT  08/09/2022    x 2, Dr. Narayan    EYE SURGERY Bilateral     HAND/FINGER SURGERY UNLISTED Right 2018    ORIF    IR TUNNELED DIALYSIS CATHETER   04/01/2022    TOE SURGERY Right     great toe    TONSILLECTOMY  1978         Current Outpatient Medications:     NEPHRO-CARLI 0.8 MG Oral Tab, TAKE 1 TABLET BY MOUTH EVERY DAY, Disp: 90 tablet, Rfl: 0    carvedilol 12.5 MG Oral Tab, Take 1 tablet (12.5 mg total) by mouth 2 (two) times daily., Disp: 90 tablet, Rfl: 3    furosemide 80 MG Oral Tab, Take 1 tablet (80 mg total) by mouth 2 (two) times daily., Disp: 180 tablet, Rfl: 3    albuterol (2.5 MG/3ML) 0.083% Inhalation Nebu Soln, Take 3 mL (2.5 mg total) by nebulization every 4 (four) hours as needed for Wheezing or Shortness of Breath., Disp: 75 mL, Rfl: 0    glipiZIDE 10 MG Oral Tab, Take 1 tablet (10 mg total) by mouth 2 (two) times daily before meals., Disp: 180 tablet, Rfl: 3    MIDODRINE 5 MG Oral Tab, TAKE 1 TABLET BY MOUTH 1 HOUR BEFORE THE END OF DIALYSIS, Disp: 90 tablet, Rfl: 1    ketorolac 0.5 % Ophthalmic Solution, , Disp: , Rfl:     ALBUTEROL 108 (90 Base) MCG/ACT Inhalation Aero Soln, INHALE 2 PUFFS INTO THE LUNGS EVERY 6 HOURS AS NEEDED FOR WHEEZING FOR UP TO 30 DAYS., Disp: 8.5 each, Rfl: 3    atorvastatin 40 MG Oral Tab, Take 1 tablet (40 mg total) by mouth nightly., Disp: 30 tablet, Rfl: 2    aspirin 81 MG Oral Tab EC, Take 1 tablet (81 mg total) by mouth daily., Disp: 30 tablet, Rfl: 2    Sevelamer 800 MG Oral Tab, Take 1 tablet (800 mg total) by mouth 3 (three) times daily., Disp: , Rfl:     Allergies:No Known Allergies    Social History     Socioeconomic History    Marital status:      Spouse name: Not on file    Number of children: 3    Years of education: Not on file    Highest education level: Not on file   Occupational History    Occupation: [employer]    Tobacco Use    Smoking status: Former    Smokeless tobacco: Never    Tobacco comments:     Quit due to asthma   Vaping Use    Vaping Use: Never used   Substance and Sexual Activity    Alcohol use: Not Currently     Alcohol/week: 2.0 standard drinks of alcohol      Types: 2 Cans of beer per week    Drug use: Yes     Frequency: 7.0 times per week     Types: Cannabis     Comment: Smoke marijuana to stimulate appetite    Sexual activity: Not on file   Other Topics Concern    Not on file   Social History Narrative    Not on file     Social Determinants of Health     Financial Resource Strain: Not on file   Food Insecurity: Not on file   Transportation Needs: Not on file   Physical Activity: Not on file   Stress: Not on file   Social Connections: Not on file   Housing Stability: Not on file           PHYSICAL EXAM:     Vitals:    01/09/24 1753   BP: 106/67   Pulse: 90   Resp: 17   Weight: 196 lb (88.9 kg)   Height: 5' 9\" (1.753 m)       Body mass index is 28.94 kg/m².    Physical Exam  Constitutional:       Appearance: Normal appearance.   Eyes:      General: No scleral icterus.  Cardiovascular:      Rate and Rhythm: Normal rate and regular rhythm.      Pulses: Normal pulses.      Heart sounds: Normal heart sounds. No murmur heard.  Pulmonary:      Effort: Pulmonary effort is normal. No respiratory distress.      Breath sounds: Normal breath sounds.   Abdominal:      General: Abdomen is flat. Bowel sounds are normal.      Hernia: A hernia is present. Hernia is present in the ventral area.   Neurological:      Mental Status: He is alert.   Psychiatric:         Mood and Affect: Mood normal.         Behavior: Behavior normal.           ASSESSMENT/PLAN:   1. Preop exam for internal medicine  Labs ordered by surgery but still need to be completed.  EKG recently done by cardiology.  Have requested patient to obtain copy and fax to the surgeon.  Patient has obtained surgical clearance by his cardiologist.    2. Ventral hernia without obstruction or gangrene  As per #1.    3. ESRD (end stage renal disease) on dialysis (HCC)  Patient to contact his nephrologist regarding medications leading into his surgery as well as modifications of dialysis.    4. Type 2 diabetes mellitus with right  eye affected by mild nonproliferative retinopathy without macular edema, without long-term current use of insulin (HCC)  Continue medications leading into surgery.    5. Coronary artery disease involving native coronary artery of native heart without angina pectoris  Stop aspirin per surgeon's request.  Cardiology clearance noted through care everywhere.    Return in about 6 months (around 7/9/2024) for Complete physical.    This note was prepared using Dragon Medical voice recognition dictation software. As a result errors may occur. When identified these errors have been corrected. While every attempt is made to correct errors during dictation discrepancies may still exist.    Jaycob Aleman MD  1/9/2024    Preoperative testing reviewed.  Patient is cleared for upcoming surgery without further testing.  Cardiology clearance reviewed as well.  Patient to contact his nephrologist regarding medications leading into surgery as well as scheduled for his dialysis.  Clearance is effective for 30 days from the original encounter date.    Jaycob Aleman MD  1/10/2024

## 2024-01-16 ENCOUNTER — TELEPHONE (OUTPATIENT)
Dept: SURGERY | Facility: CLINIC | Age: 54
End: 2024-01-16

## 2024-01-16 NOTE — TELEPHONE ENCOUNTER
Phone call 1- from Jade in Pre-admission testing at [facility].  Per Jade the patient took ASA today and is scheduled for surgery Thursday 1-18-24.      Message to provider sent and he agreed to proceed with the surgery.      Phone call to patient he reports he took ASA yesterday, \"forgot\" he did not take it the day before and he couldn't remember if the took it two days ago.  Patient did get cardi clearance and medical clearance from Dr. Aleman and Dr. Santos.  The patient agreed to hold off on the ASA for the duration of the week.  I will follow up after I talk to provider.      Phone call back to the patient.  It is ok to have surgery HOLD ASA until after the surgery.      I will send to provider to sign on for Pre-admission testing.      Kat

## 2024-01-16 NOTE — PAT NURSING NOTE
Spoke with Kat at Dr. Gomes's office. Informed her that patient stated Dr. Santos deferred him to Dr. Gomes with regards when to stop his aspirin for surgery. Patient states he sent a \"My Chart\" message to Dr. Gomes's office and received no reply. Kat states patient was given instructions at his appointment to stop aspirin for 5 days prior to surgery. She will speak with Dr. Gomes on how to proceed.

## 2024-01-16 NOTE — DISCHARGE INSTRUCTIONS
You will be discharged with your PICC line in place.  Do not have this removed unless instructed to do so by a physician.  You will need to have a repeat ultrasound of your left arm in about 1 week.  Please call your primary care physician, surgeon, or Fernanda Escobedo in interventional radiology for the results and instructions.  Present to the emergency room immediately if you develop any sudden onset chest pain or shortness of breath or your left arm becomes swollen.  Restart your aspirin when instructed to do so by your surgeon.    PLEASE call your OP dialysis company to let them know you will be resuming your dialysis schedule.  -------------------  Ice pack as needed.    May shower in 24 hours.  Wear abdominal binder for support.  Remove outer dressing in 24 hours leave Steri-Strips for 1 week  Norco 10 mg 1-2 tablets every 4-6 hours as needed.  Take Colace as a stool softener  If constipated take MiraLAX  15 pound lifting restriction for 4 weeks   May drive in 1 week, when pain-free, and when not taking Norco  Follow-up with Riaz in 2 weeks    HOME INSTRUCTIONS  AMBSURG HOME CARE INSTRUCTIONS: POST-OP ANESTHESIA  The medication that you received for sedation or general anesthesia can last up to 24 hours. Your judgment and reflexes may be altered, even if you feel like your normal self.      We Recommend:   Do not drive any motor vehicle or bicycle   Avoid mowing the lawn, playing sports, or working with power tools/applicances (power saws, electric knives or mixers)   That you have someone stay with you on your first night home   Do not drink alcohol or take sleeping pills or tranquilizers   Do not sign legal documents within 24 hours of your procedure   If you had a nerve block for your surgery, take extra care not to put any pressure on your arm or hand for 24 hours    It is normal:  For you to have a sore throat if you had a breathing tube during surgery (while you were asleep!). The sore throat should get  better within 48 hours. You can gargle with warm salt water (1/2 tsp in 4 oz warm water) or use a throat lozenge for comfort  To feel muscle aches or soreness especially in the abdomen, chest or neck. The achy feeling should go away in the next 24 hours  To feel weak, sleepy or \"wiped out\". Your should start feeling better in the next 24 hours.   To experience mild discomforts such as sore lip or tongue, headache, cramps, gas pains or a bloated feeling in your abdomen.   To experience mild back pain or soreness for a day or two if you had spinal or epidural anesthesia.   If you had laparoscopic surgery, to feel shoulder pain or discomfort on the day of surgery.   For some patients to have nausea after surgery/anesthesia    If you feel nausea or experience vomiting:   Try to move around less.   Eat less than usual or drink only liquids until the next morning   Nausea should resolve in about 24 hours    If you have a problem when you are at home:    Call your surgeons office   Discharge Instructions: After Your Surgery  You’ve just had surgery. During surgery, you were given medicine called anesthesia to keep you relaxed and free of pain. After surgery, you may have some pain or nausea. This is common. Here are some tips for feeling better and getting well after surgery.   Going home  Your healthcare provider will show you how to take care of yourself when you go home. They'll also answer your questions. Have an adult family member or friend drive you home. For the first 24 hours after your surgery:   Don't drive or use heavy equipment.  Don't make important decisions or sign legal papers.  Take medicines as directed.  Don't drink alcohol.  Have someone stay with you, if needed. They can watch for problems and help keep you safe.  Be sure to go to all follow-up visits with your healthcare provider. And rest after your surgery for as long as your provider tells you to.   Coping with pain  If you have pain after  surgery, pain medicine will help you feel better. Take it as directed, before pain becomes severe. Also, ask your healthcare provider or pharmacist about other ways to control pain. This might be with heat, ice, or relaxation. And follow any other instructions your surgeon or nurse gives you.      Stay on schedule with your medicine.     Tips for taking pain medicine  To get the best relief possible, remember these points:   Pain medicines can upset your stomach. Taking them with a little food may help.  Most pain relievers taken by mouth need at least 20 to 30 minutes to start to work.  Don't wait till your pain becomes severe before you take your medicine. Try to time your medicine so that you can take it before starting an activity. This might be before you get dressed, go for a walk, or sit down for dinner.  Constipation is a common side effect of some pain medicines. Call your healthcare provider before taking any medicines such as laxatives or stool softeners to help ease constipation. Also ask if you should skip any foods. Drinking lots of fluids and eating foods such as fruits and vegetables that are high in fiber can also help. Remember, don't take laxatives unless your surgeon has prescribed them.  Drinking alcohol and taking pain medicine can cause dizziness and slow your breathing. It can even be deadly. Don't drink alcohol while taking pain medicine.  Pain medicine can make you react more slowly to things. Don't drive or run machinery while taking pain medicine.  Your healthcare provider may tell you to take acetaminophen to help ease your pain. Ask them how much you're supposed to take each day. Acetaminophen or other pain relievers may interact with your prescription medicines or other over-the-counter (OTC) medicines. Some prescription medicines have acetaminophen and other ingredients in them. Using both prescription and OTC acetaminophen for pain can cause you to accidentally overdose. Read the  labels on your OTC medicines with care. This will help you to clearly know the list of ingredients, how much to take, and any warnings. It may also help you not take too much acetaminophen. If you have questions or don't understand the information, ask your pharmacist or healthcare provider to explain it to you before you take the OTC medicine.   Managing nausea  Some people have an upset stomach (nausea) after surgery. This is often because of anesthesia, pain, or pain medicine, less movement of food in the stomach, or the stress of surgery. These tips will help you handle nausea and eat healthy foods as you get better. If you were on a special food plan before surgery, ask your healthcare provider if you should follow it while you get better. Check with your provider on how your eating should progress. It may depend on the surgery you had. These general tips may help:   Don't push yourself to eat. Your body will tell you when to eat and how much.  Start off with clear liquids and soup. They're easier to digest.  Next try semi-solid foods as you feel ready. These include mashed potatoes, applesauce, and gelatin.  Slowly move to solid foods. Don’t eat fatty, rich, or spicy foods at first.  Don't force yourself to have 3 large meals a day. Instead eat smaller amounts more often.  Take pain medicines with a small amount of solid food, such as crackers or toast. This helps prevent nausea.  When to call your healthcare provider  Call your healthcare provider right away if you have any of these:   You still have too much pain, or the pain gets worse, after taking the medicine. The medicine may not be strong enough. Or there may be a complication from the surgery.  You feel too sleepy, dizzy, or groggy. The medicine may be too strong.  Side effects such as nausea or vomiting. Your healthcare provider may advise taking other medicines to .  Skin changes such as rash, itching, or hives. This may mean you have an allergic  reaction. Your provider may advise taking other medicines.  The incision looks different (for instance, part of it opens up).  Bleeding or fluid leaking from the incision site, and weren't told to expect that.  Fever of 100.4°F (38°C) or higher, or as directed by your provider.  Call 911  Call 911 right away if you have:   Trouble breathing  Facial swelling    If you have obstructive sleep apnea   You were given anesthesia medicine during surgery to keep you comfortable and free of pain. After surgery, you may have more apnea spells because of this medicine and other medicines you were given. The spells may last longer than normal.    At home:  Keep using the continuous positive airway pressure (CPAP) device when you sleep. Unless your healthcare provider tells you not to, use it when you sleep, day or night. CPAP is a common device used to treat obstructive sleep apnea.  Talk with your provider before taking any pain medicine, muscle relaxants, or sedatives. Your provider will tell you about the possible dangers of taking these medicines.  Contact your provider if your sleeping changes a lot even when taking medicines as directed.  Alexandra last reviewed this educational content on 10/1/2021  © 7435-8939 [employer]. All rights reserved. This information is not intended as a substitute for professional medical care. Always follow your healthcare professional's instructions.

## 2024-01-18 ENCOUNTER — ANESTHESIA (OUTPATIENT)
Dept: SURGERY | Facility: HOSPITAL | Age: 54
End: 2024-01-18
Payer: COMMERCIAL

## 2024-01-18 ENCOUNTER — APPOINTMENT (OUTPATIENT)
Dept: CT IMAGING | Facility: HOSPITAL | Age: 54
End: 2024-01-18
Attending: HOSPITALIST
Payer: COMMERCIAL

## 2024-01-18 ENCOUNTER — ANESTHESIA EVENT (OUTPATIENT)
Dept: SURGERY | Facility: HOSPITAL | Age: 54
End: 2024-01-18
Payer: COMMERCIAL

## 2024-01-18 ENCOUNTER — HOSPITAL ENCOUNTER (INPATIENT)
Facility: HOSPITAL | Age: 54
LOS: 5 days | Discharge: HOME | End: 2024-01-23
Attending: SURGERY | Admitting: SURGERY
Payer: COMMERCIAL

## 2024-01-18 DIAGNOSIS — G89.18 POST-OPERATIVE PAIN: Primary | ICD-10-CM

## 2024-01-18 DIAGNOSIS — I82.90 THROMBUS: ICD-10-CM

## 2024-01-18 PROBLEM — R10.9 ABDOMINAL PAIN: Status: ACTIVE | Noted: 2024-01-18

## 2024-01-18 LAB
ANION GAP SERPL CALC-SCNC: 3 MMOL/L (ref 0–18)
ANION GAP SERPL CALC-SCNC: 5 MMOL/L (ref 0–18)
ANTIBODY SCREEN: NEGATIVE
BASOPHILS # BLD AUTO: 0.01 X10(3) UL (ref 0–0.2)
BASOPHILS # BLD AUTO: 0.03 X10(3) UL (ref 0–0.2)
BASOPHILS NFR BLD AUTO: 0.1 %
BASOPHILS NFR BLD AUTO: 0.2 %
BUN BLD-MCNC: 26 MG/DL (ref 9–23)
BUN BLD-MCNC: 30 MG/DL (ref 9–23)
BUN/CREAT SERPL: 5 (ref 10–20)
BUN/CREAT SERPL: 5.6 (ref 10–20)
CALCIUM BLD-MCNC: 8.3 MG/DL (ref 8.7–10.4)
CALCIUM BLD-MCNC: 8.5 MG/DL (ref 8.7–10.4)
CHLORIDE SERPL-SCNC: 110 MMOL/L (ref 98–112)
CHLORIDE SERPL-SCNC: 112 MMOL/L (ref 98–112)
CO2 SERPL-SCNC: 22 MMOL/L (ref 21–32)
CO2 SERPL-SCNC: 27 MMOL/L (ref 21–32)
CREAT BLD-MCNC: 5.2 MG/DL
CREAT BLD-MCNC: 5.34 MG/DL
DEPRECATED RDW RBC AUTO: 55.1 FL (ref 35.1–46.3)
DEPRECATED RDW RBC AUTO: 58.9 FL (ref 35.1–46.3)
EGFRCR SERPLBLD CKD-EPI 2021: 12 ML/MIN/1.73M2 (ref 60–?)
EGFRCR SERPLBLD CKD-EPI 2021: 12 ML/MIN/1.73M2 (ref 60–?)
EOSINOPHIL # BLD AUTO: 0.01 X10(3) UL (ref 0–0.7)
EOSINOPHIL # BLD AUTO: 0.01 X10(3) UL (ref 0–0.7)
EOSINOPHIL NFR BLD AUTO: 0.1 %
EOSINOPHIL NFR BLD AUTO: 0.1 %
ERYTHROCYTE [DISTWIDTH] IN BLOOD BY AUTOMATED COUNT: 16.4 % (ref 11–15)
ERYTHROCYTE [DISTWIDTH] IN BLOOD BY AUTOMATED COUNT: 17.2 % (ref 11–15)
GLUCOSE BLD-MCNC: 220 MG/DL (ref 70–99)
GLUCOSE BLD-MCNC: 223 MG/DL (ref 70–99)
GLUCOSE BLDC GLUCOMTR-MCNC: 110 MG/DL (ref 70–99)
GLUCOSE BLDC GLUCOMTR-MCNC: 136 MG/DL (ref 70–99)
GLUCOSE BLDC GLUCOMTR-MCNC: 193 MG/DL (ref 70–99)
GLUCOSE BLDC GLUCOMTR-MCNC: 202 MG/DL (ref 70–99)
GLUCOSE BLDC GLUCOMTR-MCNC: 212 MG/DL (ref 70–99)
HCT VFR BLD AUTO: 24.3 %
HCT VFR BLD AUTO: 26.8 %
HCT VFR BLD AUTO: 32.8 %
HGB BLD-MCNC: 10.6 G/DL
HGB BLD-MCNC: 7.9 G/DL
HGB BLD-MCNC: 8.5 G/DL
IMM GRANULOCYTES # BLD AUTO: 0.04 X10(3) UL (ref 0–1)
IMM GRANULOCYTES # BLD AUTO: 0.06 X10(3) UL (ref 0–1)
IMM GRANULOCYTES NFR BLD: 0.4 %
IMM GRANULOCYTES NFR BLD: 0.5 %
INR BLD: 1.2 (ref 0.8–1.2)
LYMPHOCYTES # BLD AUTO: 0.45 X10(3) UL (ref 1–4)
LYMPHOCYTES # BLD AUTO: 0.52 X10(3) UL (ref 1–4)
LYMPHOCYTES NFR BLD AUTO: 4.2 %
LYMPHOCYTES NFR BLD AUTO: 4.3 %
MAGNESIUM SERPL-MCNC: 1.9 MG/DL (ref 1.6–2.6)
MCH RBC QN AUTO: 29.7 PG (ref 26–34)
MCH RBC QN AUTO: 29.7 PG (ref 26–34)
MCHC RBC AUTO-ENTMCNC: 31.7 G/DL (ref 31–37)
MCHC RBC AUTO-ENTMCNC: 32.3 G/DL (ref 31–37)
MCV RBC AUTO: 91.9 FL
MCV RBC AUTO: 93.7 FL
MONOCYTES # BLD AUTO: 0.97 X10(3) UL (ref 0.1–1)
MONOCYTES # BLD AUTO: 1.17 X10(3) UL (ref 0.1–1)
MONOCYTES NFR BLD AUTO: 10.8 %
MONOCYTES NFR BLD AUTO: 8 %
NEUTROPHILS # BLD AUTO: 10.53 X10 (3) UL (ref 1.5–7.7)
NEUTROPHILS # BLD AUTO: 10.53 X10(3) UL (ref 1.5–7.7)
NEUTROPHILS # BLD AUTO: 9.11 X10 (3) UL (ref 1.5–7.7)
NEUTROPHILS # BLD AUTO: 9.11 X10(3) UL (ref 1.5–7.7)
NEUTROPHILS NFR BLD AUTO: 84.4 %
NEUTROPHILS NFR BLD AUTO: 86.9 %
OSMOLALITY SERPL CALC.SUM OF ELEC: 300 MOSM/KG (ref 275–295)
OSMOLALITY SERPL CALC.SUM OF ELEC: 303 MOSM/KG (ref 275–295)
PLATELET # BLD AUTO: 137 10(3)UL (ref 150–450)
PLATELET # BLD AUTO: 150 10(3)UL (ref 150–450)
POTASSIUM SERPL-SCNC: 4.4 MMOL/L (ref 3.5–5.1)
POTASSIUM SERPL-SCNC: 5.3 MMOL/L (ref 3.5–5.1)
POTASSIUM SERPL-SCNC: 5.3 MMOL/L (ref 3.5–5.1)
POTASSIUM SERPL-SCNC: 5.5 MMOL/L (ref 3.5–5.1)
PROTHROMBIN TIME: 15.9 SECONDS (ref 11.6–14.8)
RBC # BLD AUTO: 2.86 X10(6)UL
RBC # BLD AUTO: 3.57 X10(6)UL
RH BLOOD TYPE: POSITIVE
RH BLOOD TYPE: POSITIVE
SODIUM SERPL-SCNC: 139 MMOL/L (ref 136–145)
SODIUM SERPL-SCNC: 140 MMOL/L (ref 136–145)
WBC # BLD AUTO: 10.8 X10(3) UL (ref 4–11)
WBC # BLD AUTO: 12.1 X10(3) UL (ref 4–11)

## 2024-01-18 PROCEDURE — 0W9G40Z DRAINAGE OF PERITONEAL CAVITY WITH DRAINAGE DEVICE, PERCUTANEOUS ENDOSCOPIC APPROACH: ICD-10-PCS | Performed by: SURGERY

## 2024-01-18 PROCEDURE — 3E033XZ INTRODUCTION OF VASOPRESSOR INTO PERIPHERAL VEIN, PERCUTANEOUS APPROACH: ICD-10-PCS | Performed by: HOSPITALIST

## 2024-01-18 PROCEDURE — 74176 CT ABD & PELVIS W/O CONTRAST: CPT | Performed by: HOSPITALIST

## 2024-01-18 PROCEDURE — 99223 1ST HOSP IP/OBS HIGH 75: CPT | Performed by: HOSPITALIST

## 2024-01-18 PROCEDURE — 0WUF4JZ SUPPLEMENT ABDOMINAL WALL WITH SYNTHETIC SUBSTITUTE, PERCUTANEOUS ENDOSCOPIC APPROACH: ICD-10-PCS | Performed by: SURGERY

## 2024-01-18 PROCEDURE — 30233N1 TRANSFUSION OF NONAUTOLOGOUS RED BLOOD CELLS INTO PERIPHERAL VEIN, PERCUTANEOUS APPROACH: ICD-10-PCS | Performed by: HOSPITALIST

## 2024-01-18 PROCEDURE — 36430 TRANSFUSION BLD/BLD COMPNT: CPT | Performed by: ANESTHESIOLOGY

## 2024-01-18 PROCEDURE — 49322 LAPAROSCOPY ASPIRATION: CPT | Performed by: SURGERY

## 2024-01-18 DEVICE — IMPLANTABLE DEVICE: Type: IMPLANTABLE DEVICE | Site: ABDOMEN | Status: FUNCTIONAL

## 2024-01-18 RX ORDER — ACETAMINOPHEN 500 MG
1000 TABLET ORAL ONCE AS NEEDED
Status: DISCONTINUED | OUTPATIENT
Start: 2024-01-18 | End: 2024-01-18 | Stop reason: HOSPADM

## 2024-01-18 RX ORDER — MORPHINE SULFATE 10 MG/ML
6 INJECTION, SOLUTION INTRAMUSCULAR; INTRAVENOUS EVERY 10 MIN PRN
Status: DISCONTINUED | OUTPATIENT
Start: 2024-01-18 | End: 2024-01-18 | Stop reason: HOSPADM

## 2024-01-18 RX ORDER — NEOSTIGMINE METHYLSULFATE 1 MG/ML
INJECTION, SOLUTION INTRAVENOUS AS NEEDED
Status: DISCONTINUED | OUTPATIENT
Start: 2024-01-18 | End: 2024-01-18 | Stop reason: SURG

## 2024-01-18 RX ORDER — LIDOCAINE HYDROCHLORIDE 10 MG/ML
INJECTION, SOLUTION EPIDURAL; INFILTRATION; INTRACAUDAL; PERINEURAL AS NEEDED
Status: DISCONTINUED | OUTPATIENT
Start: 2024-01-18 | End: 2024-01-18 | Stop reason: SURG

## 2024-01-18 RX ORDER — BUPIVACAINE HYDROCHLORIDE 2.5 MG/ML
INJECTION, SOLUTION EPIDURAL; INFILTRATION; INTRACAUDAL AS NEEDED
Status: DISCONTINUED | OUTPATIENT
Start: 2024-01-18 | End: 2024-01-18 | Stop reason: HOSPADM

## 2024-01-18 RX ORDER — GLYCOPYRROLATE 0.2 MG/ML
INJECTION, SOLUTION INTRAMUSCULAR; INTRAVENOUS AS NEEDED
Status: DISCONTINUED | OUTPATIENT
Start: 2024-01-18 | End: 2024-01-18 | Stop reason: SURG

## 2024-01-18 RX ORDER — HYDROMORPHONE HYDROCHLORIDE 1 MG/ML
0.4 INJECTION, SOLUTION INTRAMUSCULAR; INTRAVENOUS; SUBCUTANEOUS EVERY 2 HOUR PRN
Status: DISCONTINUED | OUTPATIENT
Start: 2024-01-18 | End: 2024-01-23

## 2024-01-18 RX ORDER — ONDANSETRON 2 MG/ML
4 INJECTION INTRAMUSCULAR; INTRAVENOUS EVERY 6 HOURS PRN
Status: DISCONTINUED | OUTPATIENT
Start: 2024-01-18 | End: 2024-01-23

## 2024-01-18 RX ORDER — SODIUM CHLORIDE 9 MG/ML
INJECTION, SOLUTION INTRAVENOUS CONTINUOUS PRN
Status: DISCONTINUED | OUTPATIENT
Start: 2024-01-18 | End: 2024-01-18 | Stop reason: SURG

## 2024-01-18 RX ORDER — HYDROCODONE BITARTRATE AND ACETAMINOPHEN 10; 325 MG/1; MG/1
1 TABLET ORAL EVERY 4 HOURS PRN
Qty: 30 TABLET | Refills: 0 | Status: SHIPPED | OUTPATIENT
Start: 2024-01-18

## 2024-01-18 RX ORDER — SODIUM CHLORIDE 9 MG/ML
INJECTION, SOLUTION INTRAVENOUS CONTINUOUS
Status: DISCONTINUED | OUTPATIENT
Start: 2024-01-18 | End: 2024-01-21

## 2024-01-18 RX ORDER — NALOXONE HYDROCHLORIDE 0.4 MG/ML
80 INJECTION, SOLUTION INTRAMUSCULAR; INTRAVENOUS; SUBCUTANEOUS AS NEEDED
Status: ACTIVE | OUTPATIENT
Start: 2024-01-18 | End: 2024-01-18

## 2024-01-18 RX ORDER — HYDROMORPHONE HYDROCHLORIDE 1 MG/ML
0.6 INJECTION, SOLUTION INTRAMUSCULAR; INTRAVENOUS; SUBCUTANEOUS EVERY 5 MIN PRN
Status: DISCONTINUED | OUTPATIENT
Start: 2024-01-18 | End: 2024-01-18 | Stop reason: HOSPADM

## 2024-01-18 RX ORDER — DEXAMETHASONE SODIUM PHOSPHATE 4 MG/ML
VIAL (ML) INJECTION AS NEEDED
Status: DISCONTINUED | OUTPATIENT
Start: 2024-01-18 | End: 2024-01-18 | Stop reason: SURG

## 2024-01-18 RX ORDER — DOCUSATE SODIUM 100 MG/1
100 CAPSULE, LIQUID FILLED ORAL 2 TIMES DAILY
Qty: 30 CAPSULE | Refills: 0 | Status: SHIPPED | OUTPATIENT
Start: 2024-01-18

## 2024-01-18 RX ORDER — DEXTROSE MONOHYDRATE 25 G/50ML
50 INJECTION, SOLUTION INTRAVENOUS
Status: DISCONTINUED | OUTPATIENT
Start: 2024-01-18 | End: 2024-01-18 | Stop reason: HOSPADM

## 2024-01-18 RX ORDER — HYDROMORPHONE HYDROCHLORIDE 1 MG/ML
0.8 INJECTION, SOLUTION INTRAMUSCULAR; INTRAVENOUS; SUBCUTANEOUS EVERY 2 HOUR PRN
Status: DISCONTINUED | OUTPATIENT
Start: 2024-01-18 | End: 2024-01-23

## 2024-01-18 RX ORDER — CEFAZOLIN SODIUM/WATER 2 G/20 ML
2 SYRINGE (ML) INTRAVENOUS ONCE
Status: COMPLETED | OUTPATIENT
Start: 2024-01-18 | End: 2024-01-18

## 2024-01-18 RX ORDER — ALBUMIN, HUMAN INJ 5% 5 %
12.5 SOLUTION INTRAVENOUS ONCE
Status: COMPLETED | OUTPATIENT
Start: 2024-01-18 | End: 2024-01-18

## 2024-01-18 RX ORDER — MORPHINE SULFATE 4 MG/ML
4 INJECTION, SOLUTION INTRAMUSCULAR; INTRAVENOUS EVERY 10 MIN PRN
Status: DISCONTINUED | OUTPATIENT
Start: 2024-01-18 | End: 2024-01-18 | Stop reason: HOSPADM

## 2024-01-18 RX ORDER — HYDROMORPHONE HYDROCHLORIDE 1 MG/ML
0.4 INJECTION, SOLUTION INTRAMUSCULAR; INTRAVENOUS; SUBCUTANEOUS EVERY 5 MIN PRN
Status: DISCONTINUED | OUTPATIENT
Start: 2024-01-18 | End: 2024-01-18 | Stop reason: HOSPADM

## 2024-01-18 RX ORDER — SODIUM CHLORIDE 9 MG/ML
INJECTION, SOLUTION INTRAVENOUS ONCE
Status: DISCONTINUED | OUTPATIENT
Start: 2024-01-18 | End: 2024-01-21

## 2024-01-18 RX ORDER — PROCHLORPERAZINE EDISYLATE 5 MG/ML
5 INJECTION INTRAMUSCULAR; INTRAVENOUS EVERY 8 HOURS PRN
Status: DISCONTINUED | OUTPATIENT
Start: 2024-01-18 | End: 2024-01-18 | Stop reason: HOSPADM

## 2024-01-18 RX ORDER — ACETAMINOPHEN 500 MG
1000 TABLET ORAL ONCE
Status: COMPLETED | OUTPATIENT
Start: 2024-01-18 | End: 2024-01-18

## 2024-01-18 RX ORDER — HYDROCODONE BITARTRATE AND ACETAMINOPHEN 5; 325 MG/1; MG/1
1 TABLET ORAL EVERY 4 HOURS PRN
Status: DISCONTINUED | OUTPATIENT
Start: 2024-01-18 | End: 2024-01-23

## 2024-01-18 RX ORDER — HYDROMORPHONE HYDROCHLORIDE 1 MG/ML
0.2 INJECTION, SOLUTION INTRAMUSCULAR; INTRAVENOUS; SUBCUTANEOUS EVERY 5 MIN PRN
Status: DISCONTINUED | OUTPATIENT
Start: 2024-01-18 | End: 2024-01-18 | Stop reason: HOSPADM

## 2024-01-18 RX ORDER — ONDANSETRON 2 MG/ML
INJECTION INTRAMUSCULAR; INTRAVENOUS AS NEEDED
Status: DISCONTINUED | OUTPATIENT
Start: 2024-01-18 | End: 2024-01-18 | Stop reason: SURG

## 2024-01-18 RX ORDER — HYDROCODONE BITARTRATE AND ACETAMINOPHEN 5; 325 MG/1; MG/1
2 TABLET ORAL EVERY 4 HOURS PRN
Status: DISCONTINUED | OUTPATIENT
Start: 2024-01-18 | End: 2024-01-23

## 2024-01-18 RX ORDER — PROCHLORPERAZINE EDISYLATE 5 MG/ML
5 INJECTION INTRAMUSCULAR; INTRAVENOUS EVERY 8 HOURS PRN
Status: DISCONTINUED | OUTPATIENT
Start: 2024-01-18 | End: 2024-01-23

## 2024-01-18 RX ORDER — TEMAZEPAM 15 MG/1
15 CAPSULE ORAL NIGHTLY PRN
Status: DISCONTINUED | OUTPATIENT
Start: 2024-01-18 | End: 2024-01-23

## 2024-01-18 RX ORDER — ROCURONIUM BROMIDE 10 MG/ML
INJECTION, SOLUTION INTRAVENOUS AS NEEDED
Status: DISCONTINUED | OUTPATIENT
Start: 2024-01-18 | End: 2024-01-18 | Stop reason: SURG

## 2024-01-18 RX ORDER — SODIUM CHLORIDE, SODIUM LACTATE, POTASSIUM CHLORIDE, CALCIUM CHLORIDE 600; 310; 30; 20 MG/100ML; MG/100ML; MG/100ML; MG/100ML
INJECTION, SOLUTION INTRAVENOUS CONTINUOUS
Status: DISCONTINUED | OUTPATIENT
Start: 2024-01-18 | End: 2024-01-18 | Stop reason: HOSPADM

## 2024-01-18 RX ORDER — HYDROMORPHONE HYDROCHLORIDE 1 MG/ML
0.2 INJECTION, SOLUTION INTRAMUSCULAR; INTRAVENOUS; SUBCUTANEOUS EVERY 2 HOUR PRN
Status: DISCONTINUED | OUTPATIENT
Start: 2024-01-18 | End: 2024-01-23

## 2024-01-18 RX ORDER — NICOTINE POLACRILEX 4 MG
15 LOZENGE BUCCAL
Status: DISCONTINUED | OUTPATIENT
Start: 2024-01-18 | End: 2024-01-18 | Stop reason: HOSPADM

## 2024-01-18 RX ORDER — MORPHINE SULFATE 4 MG/ML
2 INJECTION, SOLUTION INTRAMUSCULAR; INTRAVENOUS EVERY 10 MIN PRN
Status: DISCONTINUED | OUTPATIENT
Start: 2024-01-18 | End: 2024-01-18 | Stop reason: HOSPADM

## 2024-01-18 RX ORDER — EPHEDRINE SULFATE 50 MG/ML
5 INJECTION INTRAVENOUS ONCE
Status: COMPLETED | OUTPATIENT
Start: 2024-01-18 | End: 2024-01-18

## 2024-01-18 RX ORDER — NICOTINE POLACRILEX 4 MG
30 LOZENGE BUCCAL
Status: DISCONTINUED | OUTPATIENT
Start: 2024-01-18 | End: 2024-01-18 | Stop reason: HOSPADM

## 2024-01-18 RX ORDER — ONDANSETRON 2 MG/ML
4 INJECTION INTRAMUSCULAR; INTRAVENOUS EVERY 6 HOURS PRN
Status: DISCONTINUED | OUTPATIENT
Start: 2024-01-18 | End: 2024-01-18 | Stop reason: HOSPADM

## 2024-01-18 RX ORDER — ACETAMINOPHEN 325 MG/1
650 TABLET ORAL EVERY 4 HOURS PRN
Status: DISCONTINUED | OUTPATIENT
Start: 2024-01-18 | End: 2024-01-23

## 2024-01-18 RX ADMIN — NEOSTIGMINE METHYLSULFATE 4 MG: 1 INJECTION, SOLUTION INTRAVENOUS at 12:10:00

## 2024-01-18 RX ADMIN — DEXAMETHASONE SODIUM PHOSPHATE 4 MG: 4 MG/ML VIAL (ML) INJECTION at 11:32:00

## 2024-01-18 RX ADMIN — GLYCOPYRROLATE 0.6 MG: 0.2 INJECTION, SOLUTION INTRAMUSCULAR; INTRAVENOUS at 19:00:00

## 2024-01-18 RX ADMIN — ONDANSETRON 4 MG: 2 INJECTION INTRAMUSCULAR; INTRAVENOUS at 18:12:00

## 2024-01-18 RX ADMIN — CEFAZOLIN SODIUM/WATER 2 G: 2 G/20 ML SYRINGE (ML) INTRAVENOUS at 11:39:00

## 2024-01-18 RX ADMIN — SODIUM CHLORIDE: 9 INJECTION, SOLUTION INTRAVENOUS at 19:10:00

## 2024-01-18 RX ADMIN — GLYCOPYRROLATE 0.8 MG: 0.2 INJECTION, SOLUTION INTRAMUSCULAR; INTRAVENOUS at 12:10:00

## 2024-01-18 RX ADMIN — ROCURONIUM BROMIDE 10 MG: 10 INJECTION, SOLUTION INTRAVENOUS at 11:51:00

## 2024-01-18 RX ADMIN — ROCURONIUM BROMIDE 50 MG: 10 INJECTION, SOLUTION INTRAVENOUS at 18:12:00

## 2024-01-18 RX ADMIN — ONDANSETRON 4 MG: 2 INJECTION INTRAMUSCULAR; INTRAVENOUS at 11:32:00

## 2024-01-18 RX ADMIN — SODIUM CHLORIDE: 9 INJECTION, SOLUTION INTRAVENOUS at 18:04:00

## 2024-01-18 RX ADMIN — NEOSTIGMINE METHYLSULFATE 3 MG: 1 INJECTION, SOLUTION INTRAVENOUS at 18:59:00

## 2024-01-18 RX ADMIN — LIDOCAINE HYDROCHLORIDE 50 MG: 10 INJECTION, SOLUTION EPIDURAL; INFILTRATION; INTRACAUDAL; PERINEURAL at 11:32:00

## 2024-01-18 RX ADMIN — SODIUM CHLORIDE: 9 INJECTION, SOLUTION INTRAVENOUS at 11:32:00

## 2024-01-18 RX ADMIN — ROCURONIUM BROMIDE 40 MG: 10 INJECTION, SOLUTION INTRAVENOUS at 11:32:00

## 2024-01-18 NOTE — ANESTHESIA POSTPROCEDURE EVALUATION
Patient: Shane Hawthorne    Procedure Summary       Date: 01/18/24 Room / Location: [facility] MAIN OR  / [facility] MAIN OR    Anesthesia Start: 1127 Anesthesia Stop:     Procedure: Laparoscopic ventral hernia repair with mesh (Abdomen) Diagnosis:       Ventral hernia without obstruction or gangrene      (Ventral hernia without obstruction or gangrene [K43.9])    Surgeons: Pelon Gomes MD Anesthesiologist: Omar Marion MD    Anesthesia Type: general ASA Status: 3            Anesthesia Type: general    Vitals Value Taken Time   /96 01/18/24 1224   Temp 99.4 °F (37.4 °C) 01/18/24 1224   Pulse 85 01/18/24 1225   Resp 16 01/18/24 1225   SpO2 99 % 01/18/24 1225   Vitals shown include unfiled device data.    [facility] AN Post Evaluation:   Patient Evaluated in PACU  Patient Participation: complete - patient participated  Level of Consciousness: awake  Pain Score: 6  Pain Management: adequate  Airway Patency:patent  Dental exam unchanged from preop  Yes    Cardiovascular Status: acceptable  Respiratory Status: acceptable  Postoperative Hydration acceptable      OMAR MARION MD  1/18/2024 12:26 PM

## 2024-01-18 NOTE — INTERVAL H&P NOTE
Pre-op Diagnosis: Ventral hernia without obstruction or gangrene [K43.9]    The above referenced H&P was reviewed by Pelon Gomes MD on 1/18/2024, the patient was examined and no significant changes have occurred in the patient's condition since the H&P was performed.  I discussed with the patient and/or legal representative the potential benefits, risks and side effects of this procedure; the likelihood of the patient achieving goals; and potential problems that might occur during recuperation.  I discussed reasonable alternatives to the procedure, including risks, benefits and side effects related to the alternatives and risks related to not receiving this procedure.  We will proceed with procedure as planned.

## 2024-01-18 NOTE — ANESTHESIA PREPROCEDURE EVALUATION
Anesthesia PreOp Note    HPI:     Shane Hawthorne is a 53 year old male who presents for preoperative consultation requested by: Pelon Gomes MD    Date of Surgery: 1/18/2024    Procedure(s):  Laparoscopic ventral hernia repair with mesh  Indication: Ventral hernia without obstruction or gangrene [K43.9]    Relevant Problems   No relevant active problems       NPO:  Last Liquid Consumption Date: 01/17/24  Last Liquid Consumption Time: 2000  Last Solid Consumption Date: 01/17/24  Last Solid Consumption Time: 1700  Last Liquid Consumption Date: 01/17/24          History Review:  Patient Active Problem List    Diagnosis Date Noted    Posterior subcapsular age-related cataract of both eyes 05/10/2022    ESRD (end stage renal disease) on dialysis ([facility]) 05/03/2022    Primary hypertension 05/03/2022    Type 2 diabetes mellitus with right eye affected by mild nonproliferative retinopathy without macular edema, without long-term current use of insulin ([facility]) 05/03/2022    Hypercholesterolemia 05/03/2022    Ventral hernia without obstruction or gangrene 05/03/2022    Coronary artery disease involving native coronary artery of native heart without angina pectoris 05/03/2022       Past Medical History:   Diagnosis Date    Allergic rhinitis 1/1/2000    Asthma     Atherosclerosis of coronary artery     Cataract     Colon polyps     Diabetes ([facility]) 7/1/1995    Diabetes mellitus, type II ([facility])     End stage renal disease on dialysis ([facility]) 04/2022    Essential hypertension     Hernia, abdominal     High blood pressure     High cholesterol     Hyperlipidemia     Hypertension     Macular edema     multiple Eylea injections- Dr. Marquis    Neuropathy     Obesity 1/1/1984    Renal disorder        Past Surgical History:   Procedure Laterality Date    APPENDECTOMY  1974    CATARACT  11/1/2022    COLONOSCOPY  11/30/2021    COLONOSCOPY  2022    CORONARY STENT PLACEMENT  08/09/2022    x 2, Dr. Narayan    EYE SURGERY Bilateral     HAND/FINGER  SURGERY UNLISTED Right 2018    ORIF    IR TUNNELED DIALYSIS CATHETER  2022    TOE SURGERY Right     great toe    TONSILLECTOMY  1978       Medications Prior to Admission   Medication Sig Dispense Refill Last Dose    NEPHRO-CARLI 0.8 MG Oral Tab TAKE 1 TABLET BY MOUTH EVERY DAY 90 tablet 0 2024    carvedilol 12.5 MG Oral Tab Take 1 tablet (12.5 mg total) by mouth 2 (two) times daily. 90 tablet 3 2024 at 0800    furosemide 80 MG Oral Tab Take 1 tablet (80 mg total) by mouth 2 (two) times daily. 180 tablet 3 2024 at 1700    glipiZIDE 10 MG Oral Tab Take 1 tablet (10 mg total) by mouth 2 (two) times daily before meals. 180 tablet 3 2024 at 1700    ALBUTEROL 108 (90 Base) MCG/ACT Inhalation Aero Soln INHALE 2 PUFFS INTO THE LUNGS EVERY 6 HOURS AS NEEDED FOR WHEEZING FOR UP TO 30 DAYS. 8.5 each 3 Past Month    atorvastatin 40 MG Oral Tab Take 1 tablet (40 mg total) by mouth nightly. 30 tablet 2 2024    aspirin 81 MG Oral Tab EC Take 1 tablet (81 mg total) by mouth daily. 30 tablet 2 1/15/2024    Sevelamer 800 MG Oral Tab Take 1 tablet (800 mg total) by mouth 3 (three) times daily.   2024 at 1700    albuterol (2.5 MG/3ML) 0.083% Inhalation Nebu Soln Take 3 mL (2.5 mg total) by nebulization every 4 (four) hours as needed for Wheezing or Shortness of Breath. 75 mL 0 More than a month    [] metoclopramide 10 MG Oral Tab Take 1 tablet (10 mg total) by mouth 3 (three) times daily as needed. 14 tablet 0     [] metoclopramide 10 MG Oral Tab Take 1 tablet (10 mg total) by mouth 3 (three) times daily as needed. 14 tablet 0     MIDODRINE 5 MG Oral Tab TAKE 1 TABLET BY MOUTH 1 HOUR BEFORE THE END OF DIALYSIS 90 tablet 1     ketorolac 0.5 % Ophthalmic Solution  (Patient not taking: Reported on 2024)   More than a month     Current Facility-Administered Medications Ordered in Epic   Medication Dose Route Frequency Provider Last Rate Last Admin    sodium chloride 0.9% infusion    Intravenous Continuous Pelon Gomes MD 20 mL/hr at 01/18/24 0915 New Bag at 01/18/24 0915    ceFAZolin (Ancef) 2 g in 20mL IV syringe premix  2 g Intravenous Once Pelon Gomes MD         No current [facility]-ordered outpatient medications on file.       No Known Allergies    Family History   Problem Relation Age of Onset    No Known Problems Father     Heart Disorder Mother     Asthma Mother     Diabetes Maternal Grandmother     Glaucoma Neg     Macular degeneration Neg      Social History     Socioeconomic History    Marital status:     Number of children: 3   Occupational History    Occupation: [employer]    Tobacco Use    Smoking status: Former    Smokeless tobacco: Never    Tobacco comments:     Quit due to asthma aprox 25 years ago   Vaping Use    Vaping Use: Never used   Substance and Sexual Activity    Alcohol use: Not Currently     Alcohol/week: 2.0 standard drinks of alcohol     Types: 2 Cans of beer per week    Drug use: Yes     Frequency: 7.0 times per week     Types: Cannabis     Comment: Smokes marijuana to stimulate appetite       Available pre-op labs reviewed.  Lab Results   Component Value Date    WBC 7.5 01/10/2024    RBC 3.18 (L) 01/10/2024    HGB 9.6 (L) 01/10/2024    HCT 30.4 (L) 01/10/2024    MCV 95.6 01/10/2024    MCH 30.2 01/10/2024    MCHC 31.6 01/10/2024    RDW 14.8 01/10/2024    .0 01/10/2024     Lab Results   Component Value Date     01/10/2024    K 4.4 01/18/2024     01/10/2024    CO2 27.0 01/10/2024    BUN 44 (H) 01/10/2024    CREATSERUM 6.59 (H) 01/10/2024    GLU 74 01/10/2024    PGLU 136 (H) 01/18/2024    CA 9.4 01/10/2024          Vital Signs:  Body mass index is 27.91 kg/m².   height is 1.753 m (5' 9\") and weight is 85.7 kg (189 lb). His oral temperature is 98.4 °F (36.9 °C). His blood pressure is 171/90 (abnormal) and his pulse is 102. His respiration is 16 and oxygen saturation is 97%.   Vitals:    01/16/24 1217 01/18/24 0849   BP:  (!)  171/90   Pulse:  102   Resp:  16   Temp:  98.4 °F (36.9 °C)   TempSrc:  Oral   SpO2:  97%   Weight: 88.5 kg (195 lb) 85.7 kg (189 lb)   Height: 1.753 m (5' 9\") 1.753 m (5' 9\")        Anesthesia Evaluation     Patient summary reviewed and Nursing notes reviewed    Airway   Mallampati: II  TM distance: <3 FB  Neck ROM: full  Dental      Comment: poor    Pulmonary - normal exam   (+) asthma  Cardiovascular - normal exam  (+) hypertension, CAD, CABG/stent    ROS comment: Cardiology medicine cleared    Neuro/Psych      GI/Hepatic/Renal    (+) chronic renal disease ESRD    Endo/Other    (+) diabetes mellitus  Abdominal  - normal exam                 Anesthesia Plan:   ASA:  3  Plan:   General  Airway:  ETT  Post-op Pain Management: IV analgesics  Plan Comments: Increase risk explained and accepted       I have informed Shane Hawthorne and/or legal guardian or family member of the nature of the anesthetic plan, benefits, risks including possible dental damage if relevant, major complications, and any alternative forms of anesthetic management.   All of the patient's questions were answered to the best of my ability. The patient desires the anesthetic management as planned.  OMAR MARION MD  1/18/2024 10:29 AM  Present on Admission:  **None**

## 2024-01-18 NOTE — ANESTHESIA PROCEDURE NOTES
Airway  Date/Time: 1/18/2024 11:33 AM  Urgency: Elective    Airway not difficult    General Information and Staff    Patient location during procedure: OR  Anesthesiologist: Neymar Sandy MD  Performed: anesthesiologist   Performed by: Neymar Sandy MD  Authorized by: Neymar Sandy MD      Indications and Patient Condition  Indications for airway management: anesthesia  Sedation level: deep  Preoxygenated: yes  Patient position: sniffing  Mask difficulty assessment: 1 - vent by mask    Final Airway Details  Final airway type: endotracheal airway      Successful airway: ETT  Cuffed: yes   Successful intubation technique: direct laryngoscopy  Endotracheal tube insertion site: oral  Blade: Kelly  Blade size: #4  ETT size (mm): 7.5    Cormack-Lehane Classification: grade I - full view of glottis  Placement verified by: capnometry   Measured from: teeth  ETT to teeth (cm): 21  Number of attempts at approach: 1    Additional Comments  Atx x 1

## 2024-01-18 NOTE — ANESTHESIA PREPROCEDURE EVALUATION
Anesthesia PreOp Note    HPI:     Shane Hawthorne is a 53 year old male who presents for preoperative consultation requested by: Pelon Gomes MD    Date of Surgery: 1/18/2024    Procedure(s):  DIAGNOSTIC LAPAROSCOPY, WOUND EXPLORATION  Indication: Ventral hernia without obstruction or gangrene [K43.9]    Relevant Problems   No relevant active problems       NPO:  Last Liquid Consumption Date: 01/17/24  Last Liquid Consumption Time: 2000  Last Solid Consumption Date: 01/17/24  Last Solid Consumption Time: 1700  Last Liquid Consumption Date: 01/17/24          History Review:  Patient Active Problem List    Diagnosis Date Noted    Posterior subcapsular age-related cataract of both eyes 05/10/2022    ESRD (end stage renal disease) on dialysis ([facility]) 05/03/2022    Primary hypertension 05/03/2022    Type 2 diabetes mellitus with right eye affected by mild nonproliferative retinopathy without macular edema, without long-term current use of insulin ([facility]) 05/03/2022    Hypercholesterolemia 05/03/2022    Ventral hernia without obstruction or gangrene 05/03/2022    Coronary artery disease involving native coronary artery of native heart without angina pectoris 05/03/2022       Past Medical History:   Diagnosis Date    Allergic rhinitis 1/1/2000    Asthma     Atherosclerosis of coronary artery     Cataract     Colon polyps     Diabetes ([facility]) 7/1/1995    Diabetes mellitus, type II ([facility])     End stage renal disease on dialysis ([facility]) 04/2022    Essential hypertension     Hernia, abdominal     High blood pressure     High cholesterol     Hyperlipidemia     Hypertension     Macular edema     multiple Eylea injections- Dr. Marquis    Neuropathy     Obesity 1/1/1984    Renal disorder        Past Surgical History:   Procedure Laterality Date    APPENDECTOMY  1974    CATARACT  11/1/2022    COLONOSCOPY  11/30/2021    COLONOSCOPY  2022    CORONARY STENT PLACEMENT  08/09/2022    x 2, Dr. Narayan    EYE SURGERY Bilateral     HAND/FINGER  SURGERY UNLISTED Right 2018    ORIF    IR TUNNELED DIALYSIS CATHETER  2022    TOE SURGERY Right     great toe    TONSILLECTOMY  1978       Medications Prior to Admission   Medication Sig Dispense Refill Last Dose    NEPHRO-CARLI 0.8 MG Oral Tab TAKE 1 TABLET BY MOUTH EVERY DAY 90 tablet 0 2024    carvedilol 12.5 MG Oral Tab Take 1 tablet (12.5 mg total) by mouth 2 (two) times daily. 90 tablet 3 2024 at 0800    furosemide 80 MG Oral Tab Take 1 tablet (80 mg total) by mouth 2 (two) times daily. 180 tablet 3 2024 at 1700    glipiZIDE 10 MG Oral Tab Take 1 tablet (10 mg total) by mouth 2 (two) times daily before meals. 180 tablet 3 2024 at 1700    ALBUTEROL 108 (90 Base) MCG/ACT Inhalation Aero Soln INHALE 2 PUFFS INTO THE LUNGS EVERY 6 HOURS AS NEEDED FOR WHEEZING FOR UP TO 30 DAYS. 8.5 each 3 Past Month    atorvastatin 40 MG Oral Tab Take 1 tablet (40 mg total) by mouth nightly. 30 tablet 2 2024    aspirin 81 MG Oral Tab EC Take 1 tablet (81 mg total) by mouth daily. 30 tablet 2 1/15/2024    Sevelamer 800 MG Oral Tab Take 1 tablet (800 mg total) by mouth 3 (three) times daily.   2024 at 1700    albuterol (2.5 MG/3ML) 0.083% Inhalation Nebu Soln Take 3 mL (2.5 mg total) by nebulization every 4 (four) hours as needed for Wheezing or Shortness of Breath. 75 mL 0 More than a month    [] metoclopramide 10 MG Oral Tab Take 1 tablet (10 mg total) by mouth 3 (three) times daily as needed. 14 tablet 0     [] metoclopramide 10 MG Oral Tab Take 1 tablet (10 mg total) by mouth 3 (three) times daily as needed. 14 tablet 0     MIDODRINE 5 MG Oral Tab TAKE 1 TABLET BY MOUTH 1 HOUR BEFORE THE END OF DIALYSIS 90 tablet 1     ketorolac 0.5 % Ophthalmic Solution  (Patient not taking: Reported on 2024)   More than a month     Current Facility-Administered Medications Ordered in Epic   Medication Dose Route Frequency Provider Last Rate Last Admin    sodium chloride 0.9% infusion    Intravenous Continuous Pelon Gomes MD 50 mL/hr at 01/18/24 1132 Rate Change at 01/18/24 1132    glucose (Dex4) 15 GM/59ML oral liquid 15 g  15 g Oral Q15 Min PRN Neymar Sandy MD        Or    glucose (Glutose) 40% oral gel 15 g  15 g Oral Q15 Min PRN Neymar Sandy MD        Or    glucose-vitamin C (Dex-4) chewable tab 4 tablet  4 tablet Oral Q15 Min PRN Neymar Sandy MD        Or    dextrose 50% injection 50 mL  50 mL Intravenous Q15 Min PRN Neymar Sandy MD        Or    glucose (Dex4) 15 GM/59ML oral liquid 30 g  30 g Oral Q15 Min PRN Neymar Sandy MD        Or    glucose (Glutose) 40% oral gel 30 g  30 g Oral Q15 Min PRN Neymar Sandy MD        Or    glucose-vitamin C (Dex-4) chewable tab 8 tablet  8 tablet Oral Q15 Min PRN Neymar Sandy MD        lactated ringers infusion   Intravenous Continuous Neymar Sandy MD        lactated ringers IV bolus 500 mL  500 mL Intravenous Once PRN Neymar Sandy MD        acetaminophen (Tylenol Extra Strength) tab 1,000 mg  1,000 mg Oral Once PRN Neymar Sandy MD        atropine 0.1 MG/ML injection 0.5 mg  0.5 mg Intravenous PRN Neymar Sandy MD        ondansetron (Zofran) 4 MG/2ML injection 4 mg  4 mg Intravenous Q6H PRN Neymar Sandy MD        prochlorperazine (Compazine) 10 MG/2ML injection 5 mg  5 mg Intravenous Q8H PRN Neymar Sandy MD        naloxone (Narcan) 0.4 MG/ML injection 80 mcg  80 mcg Intravenous PRN Neymar Sandy MD        fentaNYL (Sublimaze) 50 mcg/mL injection 25 mcg  25 mcg Intravenous Q5 Min PRN Neymar Sandy MD   25 mcg at 01/18/24 1256    fentaNYL (Sublimaze) 50 mcg/mL injection 50 mcg  50 mcg Intravenous Q5 Min PRN Neymar Sandy MD   50 mcg at 01/18/24 1226    HYDROmorphone (Dilaudid) 1 MG/ML injection 0.2 mg  0.2 mg Intravenous Q5 Min PRN Neymar Sandy MD   0.2 mg at 01/18/24 1346    HYDROmorphone (Dilaudid) 1 MG/ML injection 0.4 mg  0.4 mg Intravenous Q5 Min PRN Neymar Sandy MD   0.3 mg at 01/18/24 1326     HYDROmorphone (Dilaudid) 1 MG/ML injection 0.6 mg  0.6 mg Intravenous Q5 Min PRN Neymar Sandy MD   0.3 mg at 01/18/24 1356    morphINE PF 4 MG/ML injection 2 mg  2 mg Intravenous Q10 Min PRN Neymar Sandy MD        morphINE PF 4 MG/ML injection 4 mg  4 mg Intravenous Q10 Min PRN Neymar Sandy MD        morphINE PF 10 MG/ML injection 6 mg  6 mg Intravenous Q10 Min PRN Neymar Sandy MD        sodium chloride 0.9% infusion   Intravenous Once Delroy Doyle MD        norepinephrine (Levophed) 4 mg/250mL infusion premix  0.5-30 mcg/min Intravenous Continuous Delroy Doyle MD 7.5 mL/hr at 01/18/24 1658 2 mcg/min at 01/18/24 1658    desmopressin (DDAVP) 25.8 mcg in sodium chloride 0.9% 50 mL IVPB  0.3 mcg/kg Intravenous Once Delroy Doyle MD         Current Outpatient Medications Ordered in Epic   Medication Sig Dispense Refill    HYDROcodone-acetaminophen (NORCO)  MG Oral Tab Take 1 tablet by mouth every 4 (four) hours as needed for Pain. 30 tablet 0    docusate sodium 100 MG Oral Cap Take 1 capsule (100 mg total) by mouth 2 (two) times daily. 30 capsule 0       No Known Allergies    Family History   Problem Relation Age of Onset    No Known Problems Father     Heart Disorder Mother     Asthma Mother     Diabetes Maternal Grandmother     Glaucoma Neg     Macular degeneration Neg      Social History     Socioeconomic History    Marital status:     Number of children: 3   Occupational History    Occupation: [employer]    Tobacco Use    Smoking status: Former    Smokeless tobacco: Never    Tobacco comments:     Quit due to asthma aprox 25 years ago   Vaping Use    Vaping Use: Never used   Substance and Sexual Activity    Alcohol use: Not Currently     Alcohol/week: 2.0 standard drinks of alcohol     Types: 2 Cans of beer per week    Drug use: Yes     Frequency: 7.0 times per week     Types: Cannabis     Comment: Smokes marijuana to stimulate appetite       Available pre-op  labs reviewed.  Lab Results   Component Value Date    WBC 7.5 01/10/2024    RBC 3.18 (L) 01/10/2024    HGB 7.9 (L) 01/18/2024    HCT 24.3 (L) 01/18/2024    MCV 95.6 01/10/2024    MCH 30.2 01/10/2024    MCHC 31.6 01/10/2024    RDW 14.8 01/10/2024    .0 01/10/2024     Lab Results   Component Value Date     01/10/2024    K 4.4 01/18/2024     01/10/2024    CO2 27.0 01/10/2024    BUN 44 (H) 01/10/2024    CREATSERUM 6.59 (H) 01/10/2024    GLU 74 01/10/2024    PGLU 212 (H) 01/18/2024    CA 9.4 01/10/2024          Vital Signs:  Body mass index is 27.91 kg/m².   height is 1.753 m (5' 9\") and weight is 85.7 kg (189 lb). His temporal temperature is 97.3 °F (36.3 °C). His blood pressure is 84/55 (abnormal) and his pulse is 96. His respiration is 15 and oxygen saturation is 100%.   Vitals:    01/18/24 1536 01/18/24 1546 01/18/24 1556 01/18/24 1606   BP: 90/54 (!) 82/54 94/61 (!) 84/55   Pulse: 72 91 98 96   Resp: 26 16 21 15   Temp:       TempSrc:       SpO2: 100% 100% 100% 100%   Weight:       Height:            Anesthesia Evaluation     Patient summary reviewed and Nursing notes reviewed    Airway   Mallampati: II  TM distance: >3 FB  Neck ROM: full  Dental - Dentition appears grossly intact     Pulmonary - normal exam   (+) asthma  Cardiovascular - normal exam  (+) hypertension, CAD    Neuro/Psych - negative ROS     GI/Hepatic/Renal      Comments: Ventral hernia repair today, laparoscopy, bleeding now, confirmed with CT.    Endo/Other    Abdominal                  Anesthesia Plan:   ASA:  4  Emergent    Plan:   General  Monitors and Lines:   Additonal IV  Airway:  ETT  Post-op Pain Management: IV analgesics  Plan Comments: Pt is bleeding, Vital signs are stable.  I started the infusion of warm saline(via blood warmer).  Non crossmatched blood Oneg is here, blood bank needs another 15 -20 min to crossmatch.  Will holdoff on Oneg transfusion for alittle bit.  Will use DDAVP also  Informed Consent Plan and  Risks Discussed With:  Patient  Use of Blood Products Discussed With:  Patient  Blood Product Use Consented    Discussed plan with:  Surgeon      I have informed Shane Hawthorne and/or legal guardian or family member of the nature of the anesthetic plan, benefits, risks including possible dental damage if relevant, major complications, and any alternative forms of anesthetic management.   All of the patient's questions were answered to the best of my ability. The patient desires the anesthetic management as planned.  VIVI PRAKASH MD  1/18/2024 5:34 PM  Present on Admission:  **None**       maximum assist (25% patients effort) Nsaids Pregnancy And Lactation Text: These medications are considered safe up to 30 weeks gestation. It is excreted in breast milk.

## 2024-01-18 NOTE — OPERATIVE REPORT
[facility]    PATIENT'S NAME: AARON HOYT   ATTENDING PHYSICIAN: Pelon Gomes MD   OPERATING PHYSICIAN: Pelon Gomes MD   PATIENT ACCOUNT#:   [id number]    LOCATION:  [facility] PACU 7 [facility] 10  MEDICAL RECORD #:   [id number]       YOB: 1970  ADMISSION DATE:       01/18/2024      OPERATION DATE:  01/18/2024    OPERATIVE REPORT    PREOPERATIVE DIAGNOSIS:  Incarcerated ventral hernia measuring 6 cm.  POSTOPERATIVE DIAGNOSIS:  Incarcerated ventral hernia measuring 6 cm.  PROCEDURE:  Laparoscopic hybrid repair of ventral hernia with mesh, lysis of adhesions.    ASSISTANT:  RADHA Vinson    ESTIMATED BLOOD LOSS:  10 mL.    COMPLICATIONS:  None.    ANESTHESIA:  General.      DISPOSITION:  To Recovery, tolerated well.    INDICATIONS:  The patient is 53.  He presents with the above complaint.  Consent obtained.    OPERATIVE TECHNIQUE:  He is taken to surgery.  He is prepped and draped in the usual sterile fashion.  Veress needle placed at Francois point and abdomen insufflated.  A 5 mm trocar placed using Optiview.  Three additional trocars are placed under laparoscopic guidance.  Exploration reveals a large defect to the left, approximately 6 cm above the umbilicus.  Defect has incarcerated colon and small bowel.  Lysis of adhesions is performed to release the incarcerated contents.  The fascia is  cleared circumferentially and posteriorly to allow placement of the mesh.  I elected to make a small incision directly over the defect and close primarily with 0 looped PDS.  A 6-inch Ventralight is rolled, placed intra-abdominally, and secured in place using double-crown technique of SecureStrap.  The balloon removed intact.  All hemostasis assured.  Abdomen irrigated.  Trocars removed.  Fascia closed with 0 Vicryl.  Skin closed with 3-0 Monocryl, benzoin, and Steri-Strips.     Dictated By Pelon Gomes MD  d: 01/18/2024 12:19:51  t: 01/18/2024  14:48:26  [facility] [id number]  GL/    cc: Jaycob Aleman MD

## 2024-01-18 NOTE — H&P
[facility]    PATIENT'S NAME: AARON HOYT   ATTENDING PHYSICIAN: Pelon Gomes MD   PATIENT ACCOUNT#:   [id number]    LOCATION:  [facility] PACU 7 [facility] 10  MEDICAL RECORD #:   [id number]       YOB: 1970  ADMISSION DATE:       01/18/2024    HISTORY AND PHYSICAL EXAMINATION    (Addendum added 01/19/2024)    CHIEF COMPLAINT:  Hypotension postoperatively.    HISTORY OF PRESENT ILLNESS:  Patient is a 53-year-old  male, end-stage renal disease, on hemodialysis.  Last dialysis was yesterday.  Today, he had laparoscopic ventral hernia repair with a mesh and lysis of adhesions.  Postoperatively, he was noted to have a systolic blood pressure of 80.  He received 1 L of normal saline and blood pressure continued to be low, 70 to 80 systolic.  Also, he has been complaining of lower to mid abdominal pain.  No shortness of breath.     PAST MEDICAL HISTORY:  End-stage renal disease, on hemodialysis.  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    PAST SURGICAL HISTORY:  Appendectomy, cataract procedure, right hand finger open reduction and internal fixation, right great toe surgery, tonsillectomy.    MEDICATIONS:  Please see medication reconciliation list.      ALLERGIES:  No known drug allergies.    FAMILY HISTORY:  Mother had heart disease.  Father had asthma.    SOCIAL HISTORY:  Ex-tobacco user.  No current tobacco, alcohol, or drug use.  Lives with his family.  Independent in his basic activities of daily living.     REVIEW OF SYSTEMS:  Other 12-point review of systems is negative.  Lower abdominal discomfort and fatigue.  Patient denies any chest pain or shortness of breath.      PHYSICAL EXAMINATION:    GENERAL:  Alert and oriented to time,  place, and person.  Appears fatigued and pale.   VITAL SIGNS:  Temperature 97.3, pulse 96, respiratory rate 15, blood pressure 76/49, pulse ox 100% on 2L nasal cannula oxygen.    HEENT:  Atraumatic.  Oropharynx clear.  Dry mucous membranes.  Normal hard and soft palate.  Eyes:  Anicteric sclerae.  Pupils equal, round, reactive.  NECK:  Supple.  No lymphadenopathy.  Trachea midline.  Full range of motion.  LUNGS:  Clear to auscultation bilaterally.  Normal respiratory effort.    HEART:  Regular rate, rhythm.  S1 and S2 auscultated.  Slightly tachycardiac.    ABDOMEN:  Soft, nondistended.  There is laparoscopic incision.  Hypoactive bowel sounds.  Tenderness to superficial palpation noted mid and lower abdominal area, both lower quadrants.  No guarding.  EXTREMITIES:  No peripheral edema, clubbing, or cyanosis.    CHEST:  Right upper anterior chest with tunnelled catheter.  NEUROLOGIC:  Motor and sensory intact.     ASSESSMENT:    1.   Abdominal pain, status post laparoscopic ventral hernia repair with mesh and lysis of adhesions.  Noted also hypovolemia, mild tachycardia.  Rule out internal abdominal bleed.  2.   End-stage renal disease, on hemodialysis.  3.   Diabetes mellitus type 2.  4.   History of hypertension and coronary artery disease.    PLAN:  Patient will be admitted to general medical floor.  We will continue IV fluid support.  Obtain stat CT scan of the abdomen to evaluate for internal bleed.  Rule out internal bleed.  Notified Nephrology and General Surgery.  Pending CT scan results.  We will keep patient n.p.o. for now.  Further recommendations to follow.    ADDENDUM (Job [id number]):    Repeat hemoglobin 7.9 which has dropped from 9.6 on January 10.  CT scan of the abdomen and pelvis showed a moderate amount of hemoperitoneum, extraperitoneal hemorrhage between the mesh and repaired periumbilical abdominal wall.  Findings were communicated to Dr. Pelon Gomes, the surgeon.  Will continue patient on IV  fluid support, start him on blood transfusion, and will use IV Levophed if his blood pressure drops any further.    Dictated By Delroy Doyle MD  d: 01/18/2024 16:35:57  t: 01/18/2024 16:57:09  [facility] [id number]  FB/

## 2024-01-19 ENCOUNTER — APPOINTMENT (OUTPATIENT)
Dept: PICC SERVICES | Facility: HOSPITAL | Age: 54
End: 2024-01-19
Attending: INTERNAL MEDICINE
Payer: COMMERCIAL

## 2024-01-19 PROBLEM — G89.18 POST-OPERATIVE PAIN: Status: ACTIVE | Noted: 2024-01-19

## 2024-01-19 PROBLEM — N18.6 ESRD ON HEMODIALYSIS (HCC): Status: ACTIVE | Noted: 2024-01-19

## 2024-01-19 PROBLEM — Z99.2 ESRD ON HEMODIALYSIS (HCC): Status: ACTIVE | Noted: 2024-01-19

## 2024-01-19 LAB
ANION GAP SERPL CALC-SCNC: 6 MMOL/L (ref 0–18)
BASOPHILS # BLD AUTO: 0.02 X10(3) UL (ref 0–0.2)
BASOPHILS NFR BLD AUTO: 0.1 %
BUN BLD-MCNC: 30 MG/DL (ref 9–23)
BUN/CREAT SERPL: 5.2 (ref 10–20)
CALCIUM BLD-MCNC: 7.9 MG/DL (ref 8.7–10.4)
CHLORIDE SERPL-SCNC: 113 MMOL/L (ref 98–112)
CO2 SERPL-SCNC: 21 MMOL/L (ref 21–32)
CREAT BLD-MCNC: 5.79 MG/DL
DEPRECATED RDW RBC AUTO: 60.7 FL (ref 35.1–46.3)
EGFRCR SERPLBLD CKD-EPI 2021: 11 ML/MIN/1.73M2 (ref 60–?)
EOSINOPHIL # BLD AUTO: 0 X10(3) UL (ref 0–0.7)
EOSINOPHIL NFR BLD AUTO: 0 %
ERYTHROCYTE [DISTWIDTH] IN BLOOD BY AUTOMATED COUNT: 17.9 % (ref 11–15)
EST. AVERAGE GLUCOSE BLD GHB EST-MCNC: 123 MG/DL (ref 68–126)
GLUCOSE BLD-MCNC: 222 MG/DL (ref 70–99)
GLUCOSE BLDC GLUCOMTR-MCNC: 120 MG/DL (ref 70–99)
GLUCOSE BLDC GLUCOMTR-MCNC: 161 MG/DL (ref 70–99)
GLUCOSE BLDC GLUCOMTR-MCNC: 177 MG/DL (ref 70–99)
GLUCOSE BLDC GLUCOMTR-MCNC: 188 MG/DL (ref 70–99)
HBA1C MFR BLD: 5.9 % (ref ?–5.7)
HBV CORE AB SERPL QL IA: NONREACTIVE
HBV SURFACE AB SER QL: REACTIVE
HBV SURFACE AB SERPL IA-ACNC: 10.73 MIU/ML
HBV SURFACE AG SER-ACNC: <0.1 [IU]/L
HBV SURFACE AG SER-ACNC: <0.1 [IU]/L
HBV SURFACE AG SERPL QL IA: NONREACTIVE
HBV SURFACE AG SERPL QL IA: NONREACTIVE
HCT VFR BLD AUTO: 25.5 %
HCT VFR BLD AUTO: 28.7 %
HGB BLD-MCNC: 6.9 G/DL
HGB BLD-MCNC: 7.7 G/DL
HGB BLD-MCNC: 8.3 G/DL
HGB BLD-MCNC: 9 G/DL
IMM GRANULOCYTES # BLD AUTO: 0.05 X10(3) UL (ref 0–1)
IMM GRANULOCYTES NFR BLD: 0.4 %
LYMPHOCYTES # BLD AUTO: 0.83 X10(3) UL (ref 1–4)
LYMPHOCYTES NFR BLD AUTO: 6 %
MAGNESIUM SERPL-MCNC: 1.8 MG/DL (ref 1.6–2.6)
MCH RBC QN AUTO: 29.1 PG (ref 26–34)
MCHC RBC AUTO-ENTMCNC: 31.4 G/DL (ref 31–37)
MCV RBC AUTO: 92.9 FL
MONOCYTES # BLD AUTO: 1.44 X10(3) UL (ref 0.1–1)
MONOCYTES NFR BLD AUTO: 10.4 %
NEUTROPHILS # BLD AUTO: 11.48 X10 (3) UL (ref 1.5–7.7)
NEUTROPHILS # BLD AUTO: 11.48 X10(3) UL (ref 1.5–7.7)
NEUTROPHILS NFR BLD AUTO: 83.1 %
OSMOLALITY SERPL CALC.SUM OF ELEC: 303 MOSM/KG (ref 275–295)
PLATELET # BLD AUTO: 195 10(3)UL (ref 150–450)
POTASSIUM SERPL-SCNC: 6 MMOL/L (ref 3.5–5.1)
RBC # BLD AUTO: 3.09 X10(6)UL
SODIUM SERPL-SCNC: 140 MMOL/L (ref 136–145)
WBC # BLD AUTO: 13.8 X10(3) UL (ref 4–11)

## 2024-01-19 PROCEDURE — 99223 1ST HOSP IP/OBS HIGH 75: CPT | Performed by: INTERNAL MEDICINE

## 2024-01-19 PROCEDURE — 30233K1 TRANSFUSION OF NONAUTOLOGOUS FROZEN PLASMA INTO PERIPHERAL VEIN, PERCUTANEOUS APPROACH: ICD-10-PCS | Performed by: HOSPITALIST

## 2024-01-19 PROCEDURE — 99233 SBSQ HOSP IP/OBS HIGH 50: CPT | Performed by: HOSPITALIST

## 2024-01-19 PROCEDURE — 5A1D70Z PERFORMANCE OF URINARY FILTRATION, INTERMITTENT, LESS THAN 6 HOURS PER DAY: ICD-10-PCS | Performed by: INTERNAL MEDICINE

## 2024-01-19 PROCEDURE — 02HV33Z INSERTION OF INFUSION DEVICE INTO SUPERIOR VENA CAVA, PERCUTANEOUS APPROACH: ICD-10-PCS | Performed by: HOSPITALIST

## 2024-01-19 PROCEDURE — B548ZZA ULTRASONOGRAPHY OF SUPERIOR VENA CAVA, GUIDANCE: ICD-10-PCS | Performed by: HOSPITALIST

## 2024-01-19 RX ORDER — ALBUMIN (HUMAN) 12.5 G/50ML
25 SOLUTION INTRAVENOUS
Status: ACTIVE | OUTPATIENT
Start: 2024-01-19 | End: 2024-01-20

## 2024-01-19 RX ORDER — ALBUTEROL SULFATE 2.5 MG/3ML
2.5 SOLUTION RESPIRATORY (INHALATION) EVERY 4 HOURS PRN
Status: DISCONTINUED | OUTPATIENT
Start: 2024-01-19 | End: 2024-01-23

## 2024-01-19 RX ORDER — ASCORBIC ACID, THIAMINE, RIBOFLAVIN, NIACINAMIDE, PYRIDOXINE, FOLIC ACID, COBALAMIN, BIOTIN, PANTOTHENIC ACID 100; 1.5; 1.7; 20; 10; 1; 6; 300; 1 MG/1; MG/1; MG/1; MG/1; MG/1; MG/1; UG/1; UG/1; MG/1
1 TABLET, COATED ORAL DAILY
Status: DISCONTINUED | OUTPATIENT
Start: 2024-01-20 | End: 2024-01-23

## 2024-01-19 RX ORDER — ATORVASTATIN CALCIUM 40 MG/1
40 TABLET, FILM COATED ORAL NIGHTLY
Status: DISCONTINUED | OUTPATIENT
Start: 2024-01-19 | End: 2024-01-23

## 2024-01-19 RX ORDER — LIDOCAINE HYDROCHLORIDE 10 MG/ML
5 INJECTION, SOLUTION EPIDURAL; INFILTRATION; INTRACAUDAL; PERINEURAL
Status: COMPLETED | OUTPATIENT
Start: 2024-01-19 | End: 2024-01-19

## 2024-01-19 RX ORDER — MIDODRINE HYDROCHLORIDE 5 MG/1
5 TABLET ORAL
Status: DISCONTINUED | OUTPATIENT
Start: 2024-01-19 | End: 2024-01-23

## 2024-01-19 RX ORDER — BENZONATATE 100 MG/1
100 CAPSULE ORAL 3 TIMES DAILY PRN
Status: DISCONTINUED | OUTPATIENT
Start: 2024-01-19 | End: 2024-01-23

## 2024-01-19 RX ORDER — DEXTROSE MONOHYDRATE 25 G/50ML
50 INJECTION, SOLUTION INTRAVENOUS AS NEEDED
Status: DISCONTINUED | OUTPATIENT
Start: 2024-01-19 | End: 2024-01-23

## 2024-01-19 RX ORDER — SODIUM CHLORIDE 9 MG/ML
INJECTION, SOLUTION INTRAVENOUS ONCE
Status: COMPLETED | OUTPATIENT
Start: 2024-01-19 | End: 2024-01-19

## 2024-01-19 RX ORDER — DEXTROSE AND SODIUM CHLORIDE 5; .9 G/100ML; G/100ML
INJECTION, SOLUTION INTRAVENOUS CONTINUOUS
Status: DISCONTINUED | OUTPATIENT
Start: 2024-01-19 | End: 2024-01-20

## 2024-01-19 RX ORDER — SEVELAMER CARBONATE 800 MG/1
800 TABLET, FILM COATED ORAL 3 TIMES DAILY
Status: DISCONTINUED | OUTPATIENT
Start: 2024-01-19 | End: 2024-01-23

## 2024-01-19 RX ORDER — CALCIUM GLUCONATE 10 MG/ML
1 INJECTION, SOLUTION INTRAVENOUS ONCE
Status: COMPLETED | OUTPATIENT
Start: 2024-01-19 | End: 2024-01-19

## 2024-01-19 RX ORDER — HEPARIN SODIUM 1000 [USP'U]/ML
1.5 INJECTION, SOLUTION INTRAVENOUS; SUBCUTANEOUS
Status: DISCONTINUED | OUTPATIENT
Start: 2024-01-19 | End: 2024-01-23

## 2024-01-19 NOTE — PROGRESS NOTES
[facility]    Progress Note    Shane Hawthorne Patient Status:  Inpatient    7/10/1970 MRN [id number]   Location [facility] 2W/SW Attending Gina Garcia MD   Hosp Day # 1 PCP Jaycob Aleman MD       Subjective:   POD1 VHR and subsequent diagnostic laparoscopy with evacuation of intra-abdominal bleeding. Pain severe with coughing. Pt on dialysis MWF.    Objective:   Vital Signs:  Blood pressure 97/53, pulse 93, temperature 98.1 °F (36.7 °C), resp. rate 16, height 5' 9\" (1.753 m), weight 189 lb (85.7 kg), SpO2 97%.    Physical Exam     General: No acute distress. Alert and oriented x 3.  HEENT: Moist mucous membranes. Anicteric.   Neck: Supple, No JVD.   Respiratory: Nonlabored resp.  Cardiovascular: Regular rate.   Abdomen: Soft, expected incisional tenderness, no rebound tnd, no guarding, nondistended.  No masses. Normal bowel sounds. Dressing c/d/I, MARYELLEN bloody.  Neurologic: No focal neurological deficits.  Musculoskeletal: Full range of motion of all extremities. No calf tenderness. No swelling noted.  Integument: No lesions. No erythema.  Psychiatric: Appropriate mood and affect.         Assessment and Plan:     Abdominal pain    Okay to start diet as tolerated. Continue monitor MARYELLEN output. Nephrology to see for HD. Will continue to follow and monitor progress.    Results:     Lab Results   Component Value Date    WBC 13.8 (H) 2024    HGB 9.0 (L) 2024    HCT 28.7 (L) 2024    .0 2024    CREATSERUM 5.79 (H) 2024    BUN 30 (H) 2024     2024    K 6.0 (HH) 2024     (H) 2024    CO2 21.0 2024     (H) 2024    CA 7.9 (L) 2024    ALB 4.2 01/10/2024    ALKPHO 69 01/10/2024    BILT 0.5 01/10/2024    TP 7.5 01/10/2024    AST 19 01/10/2024    ALT 16 01/10/2024    INR 1.20 2024    TSH 2.960 2023     (H) 10/16/2023    PSA 0.11 2023    MG 1.9 2024    TROP <0.045 2021     ETOH <3 05/20/2021       CT ABDOMEN+PELVIS(CPT=74176)    Result Date: 1/18/2024  CONCLUSION:  1. Moderate amount of hemoperitoneum.  Extraperitoneal hemorrhage between the mesh and the repaired periumbilical abdominal wall.  Findings were discussed with Dr. Doyle. 2. Bibasilar atelectasis and or pneumonia.    Dictated by (CST): John Cooper MD on 1/18/2024 at 4:49 PM     Finalized by (CST): John Cooper MD on 1/18/2024 at 5:00 PM                       Riaz Jackson PA-C  1/19/2024

## 2024-01-19 NOTE — CONSULTS
[facility] ID CONSULT NOTE    Shane Hawthorne Patient Status:  Inpatient    7/10/1970 MRN [id number]   Location [facility] 2W/SW Attending Gina Garcia MD   Hosp Day # 1 PCP Jaycob Aleman MD       Reason for Consultation:  Intra-abdominal bleeding    Antibiotics: ancef x 1 dose pre-op    ASSESSMENT:    # S/p ventral hernia repair  c/b intra-abd bleeding now s/p hematoma evacuation   # leukocytosis likely reactive  # hypotensive shock in the setting of acute bleed  # ESRD on HD    PLAN:    -  continue to monitor off abx.   -  if febrile, will collect blood cx x2.   -  Follow fever curve, wbc  -  Reviewed labs, micro, imaging reports, available old records    Thank you for allowing us to participate in the care of this patient. Please do not hesitate to call if you have any questions.   We will continue to follow with you and will make further recommendations based on his progress.    History of Present Illness:  Shane Hawthorne is a a(n) 53 year old male with PMHx of HTN, HLD, DMII and ESRD on HD who underwent scheduled lap repair of ventral hernia with mesh on 24. Post-op, he had hypotension found to have intra-abdominal hemorrhage and returned to ED today for evacuation of intra-abd bleeding with drain placement. Received pre-op ancef x 1 dose. Started on levophed for hypotension. Awake. No complaints at this time.     History:  Past Medical History:   Diagnosis Date    Allergic rhinitis 2000    Asthma     Atherosclerosis of coronary artery     Cataract     Colon polyps     Diabetes (HCC) 1995    Diabetes mellitus, type II (HCC)     End stage renal disease on dialysis (HCC) 2022    Essential hypertension     Hernia, abdominal     High blood pressure     High cholesterol     Hyperlipidemia     Hypertension     Macular edema     multiple Eylea injections- Dr. Marquis    Neuropathy     Obesity 1984    Renal disorder      Past Surgical History:    Procedure Laterality Date    APPENDECTOMY  1974    CATARACT  11/1/2022    COLONOSCOPY  11/30/2021    COLONOSCOPY  2022    CORONARY STENT PLACEMENT  08/09/2022    x 2, Dr. Narayan    EYE SURGERY Bilateral     HAND/FINGER SURGERY UNLISTED Right 2018    ORIF    IR TUNNELED DIALYSIS CATHETER  04/01/2022    TOE SURGERY Right     great toe    TONSILLECTOMY  1978     Family History   Problem Relation Age of Onset    No Known Problems Father     Heart Disorder Mother     Asthma Mother     Diabetes Maternal Grandmother     Glaucoma Neg     Macular degeneration Neg       reports that he has quit smoking. He has never used smokeless tobacco. He reports that he does not currently use alcohol after a past usage of about 2.0 standard drinks of alcohol per week. He reports current drug use. Frequency: 7.00 times per week. Drug: Cannabis.    Allergies:  No Known Allergies    Medications:    Current Facility-Administered Medications:     heparin (Porcine) 1000 UNIT/ML injection 1,500 Units, 1.5 mL, Intravenous, PRN Dialysis    sodium chloride 0.9 % IV bolus 100 mL, 100 mL, Intravenous, Q30 Min PRN **AND** albumin human (Albumin) 25% injection 25 g, 25 g, Intravenous, PRN Dialysis    benzonatate (Tessalon) cap 100 mg, 100 mg, Oral, TID PRN    dextrose 50% injection 50 mL, 50 mL, Intravenous, PRN    insulin aspart (NovoLOG) 100 Units/mL FlexPen 1-5 Units, 1-5 Units, Subcutaneous, q6h    dextrose 5%-sodium chloride 0.9% infusion, , Intravenous, Continuous    sodium chloride 0.9% infusion, , Intravenous, Continuous    acetaminophen (Tylenol) tab 650 mg, 650 mg, Oral, Q4H PRN **OR** HYDROcodone-acetaminophen (Norco) 5-325 MG per tab 1 tablet, 1 tablet, Oral, Q4H PRN **OR** HYDROcodone-acetaminophen (Norco) 5-325 MG per tab 2 tablet, 2 tablet, Oral, Q4H PRN    HYDROmorphone (Dilaudid) 1 MG/ML injection 0.2 mg, 0.2 mg, Intravenous, Q2H PRN **OR** HYDROmorphone (Dilaudid) 1 MG/ML injection 0.4 mg, 0.4 mg, Intravenous, Q2H PRN **OR**  HYDROmorphone (Dilaudid) 1 MG/ML injection 0.8 mg, 0.8 mg, Intravenous, Q2H PRN    ondansetron (Zofran) 4 MG/2ML injection 4 mg, 4 mg, Intravenous, Q6H PRN    prochlorperazine (Compazine) 10 MG/2ML injection 5 mg, 5 mg, Intravenous, Q8H PRN    temazepam (Restoril) cap 15 mg, 15 mg, Oral, Nightly PRN    sodium chloride 0.9% infusion, , Intravenous, Once    norepinephrine (Levophed) 4 mg/250mL infusion premix, 0.5-30 mcg/min, Intravenous, Continuous    sodium chloride 0.9% infusion, , Intravenous, Once    Review of Systems:  CONSTITUTIONAL:  weakness  HEENT:  Eyes:  No visual loss, blurred vision, double vision or yellow sclerae. Ears, Nose, Throat:  No hearing loss, sneezing, congestion, runny nose or sore throat.  SKIN:  No rash or itching.  CARDIOVASCULAR:  No chest pain, chest pressure or chest discomfort  RESPIRATORY:  No shortness of breath, cough or sputum.  GASTROINTESTINAL:  +abdominal pain  GENITOURINARY:  No Burning on urination.   NEUROLOGICAL:  No headache, dizziness, syncope, paralysis, ataxia, numbness or tingling in the extremities.  MUSCULOSKELETAL:  No muscle, back pain, joint pain or stiffness.    Physical Exam:  Vital signs: Blood pressure 123/67, pulse 99, temperature 98.2 °F (36.8 °C), resp. rate 20, height 5' 9\" (1.753 m), weight 189 lb (85.7 kg), SpO2 94%.    General: Alert, oriented, NAD  HEENT: Moist mucous membranes. EOMI  Neck: No lymphadenopathy.  Supple.  Cardiovascular: RRR  Respiratory: Clear to auscultation bilaterally.  No wheezes. No rhonchi.  Abdomen: +abdominal binder in place. MARYELLEN drain.   Musculoskeletal: No edema noted  Integument: No lesions. No erythema.    Laboratory Data:  Recent Labs   Lab 01/19/24  0316 01/19/24  1049 01/19/24  1517   RBC 3.09*  --   --    HGB 9.0*   < > 8.3*   HCT 28.7*  --  25.5*   MCV 92.9  --   --    MCH 29.1  --   --    MCHC 31.4  --   --    RDW 17.9*  --   --    NEPRELIM 11.48*  --   --    WBC 13.8*  --   --    .0  --   --     < > = values in  this interval not displayed.     Recent Labs   Lab 01/18/24  1712 01/18/24  1934 01/19/24  0316   * 220* 222*   BUN 30* 26* 30*   CREATSERUM 5.34* 5.20* 5.79*   CA 8.5* 8.3* 7.9*    139 140   K 5.5* 5.3*  5.3* 6.0*    112 113*   CO2 27.0 22.0 21.0       Microbiology: Reviewed in EMR    Radiology: Reviewed      MD [facility]  [phone]  1/19/2024

## 2024-01-19 NOTE — ANESTHESIA PROCEDURE NOTES
Airway  Date/Time: 1/18/2024 6:14 PM  Urgency: Elective    Airway not difficult    General Information and Staff    Patient location during procedure: OR  Anesthesiologist: Carlo Calderon MD  Resident/CRNA: Neymar De Leon CRNA  Performed: anesthesiologist   Performed by: Carlo Calderon MD  Authorized by: Carlo Calderon MD      Indications and Patient Condition  Indications for airway management: anesthesia  Sedation level: deep  Preoxygenated: yes  Patient position: sniffing  Mask difficulty assessment: 1 - vent by mask    Final Airway Details  Final airway type: endotracheal airway      Successful airway: ETT  Cuffed: yes   Successful intubation technique: direct laryngoscopy  Endotracheal tube insertion site: oral  Blade: Kelly  Blade size: #3  ETT size (mm): 7.5    Cormack-Lehane Classification: grade I - full view of glottis  Placement verified by: capnometry   Measured from: teeth  ETT to teeth (cm): 21  Number of attempts at approach: 1

## 2024-01-19 NOTE — BRIEF OP NOTE
Pre-Operative Diagnosis: Postoperative bleeding     Post-Operative Diagnosis: Intra-abdominal bleeding,     Procedure Performed:     Diagnostic laparoscopy, evacuation of intra-abdominal bleeding 1500 cc  Surgeon(s) and Role: Placement of Peter     * Pelon Gomes MD - Primary    Assistant(s):  Surgical Assistant.: Kaleb Farias     Surgical Findings: Intra-abdominal bleeding, source could not be identified     Specimen: Old clot     Estimated Blood Loss: 1500cc, new 5 cc    Dictation Number:        Pelon Gomes MD  1/18/2024  6:52 PM

## 2024-01-19 NOTE — PROGRESS NOTES
General surgery addendum note    Received a call that patient was hypotensive and is receiving a fluid bolus.  He did drop his hemoglobin 1.5 g.  CT shows hematoma within the abdominal wall.  Patient is dialysis patient.  I requested he receive 10 units DDAVP and plan wound exploration.

## 2024-01-19 NOTE — OPERATIVE REPORT
[facility]    PATIENT'S NAME: AARON HOYT   ATTENDING PHYSICIAN: Pelon Gomes MD   OPERATING PHYSICIAN: Pelon Gomes MD   PATIENT ACCOUNT#:   [id number]    LOCATION:  [facility] PACU 3 [facility] 10  MEDICAL RECORD #:   [id number]       YOB: 1970  ADMISSION DATE:       01/18/2024      OPERATION DATE:  01/18/2024    OPERATIVE REPORT      PREOPERATIVE DIAGNOSIS:  Intra-abdominal hemorrhage.  POSTOPERATIVE DIAGNOSIS:  Intra-abdominal hemorrhage.  PROCEDURE:  Diagnostic laparoscopy, evacuation of intra-abdominal bleeding, placement of Peter, Sincere-Carrillo placement.    ASSISTANT:  RADHA Galloway    ESTIMATED BLOOD LOSS:  Old 1500 mL.  New 5 mL.    COMPLICATIONS:  None.    ANESTHESIA:  General.      DISPOSITION:  Tolerated well.    INDICATIONS:  Patient is 53, underwent a laparoscopic hybrid repair of a complex abdominal wall hernia.  He developed hypotension postoperatively.  His hemoglobin had dropped 1.5 g, and we agreed to a diagnostic laparoscopy after CT scan suggests intra-abdominal bleeding.  Detailed informed consent obtained.    OPERATIVE TECHNIQUE:  He is taken to surgery, is prepped and draped in usual sterile fashion.  The 3 trocars are placed on the left.  There is no bleeding from the wound.  No bleeding between the abdominal wall and the mesh.  There is quite a bit of intra-abdominal blood which is all evacuated using 2 suctions.  Afterward, the abdomen is irrigated with saline.  I could not identify a clear source as there appeared to be just some diffuse ooze, but no arterial bleeding.  This is once again inspected.  There is no bleeding from the abdominal wall.  The mesh appears intact and excellent.  I placed a Sincere-Carrillo drain and removed the trocar.  Skin closed with 3-0 Monocryl.  He will receive DDAVP for his renal failure.    Dictated By Pelon Gomes MD  d: 01/18/2024 18:58:39  t: 01/18/2024 19:19:47  Job [id number]  /

## 2024-01-19 NOTE — PROGRESS NOTES
[facility]    Progress Note    Shane Hawthorne Patient Status:  Inpatient    7/10/1970 MRN [id number]   Location [facility] 2W/SW Attending Gina Garcia MD   Hosp Day # 1 PCP Jaycob Aleman MD     Chief complaint: shock  Subjective:     Getting dialysis, awake, interactive, c/o being \"always cold\"  Passed swallow eval  Still on levophed    Wife at bedside    Objective:   Blood pressure 119/69, pulse 109, temperature 98 °F (36.7 °C), resp. rate 20, height 5' 9\" (1.753 m), weight 189 lb (85.7 kg), SpO2 94%.    GEN: acutely and chronically ill, but seems more stable now, interactive, fully awake  HEENT: conjunctivae/corneas clear. PERRL, EOM's intact.  Neck: no adenopathy, no carotid bruit, no JVD, supple  Pulmonary:  clear to auscultation bilaterally  Cardiovascular: S1, S2 normal, no murmur, click, rub or gallop, regular rate and rhythm  Abdominal: soft, tender; bowel sounds normal;    Extremities: no cyanosis or edema  Pulses: palpable and symmetric  Skin: warm  Neurologic: Alert and oriented X 3, moves all extremities   Psychiatric: calm, cooperative    Assessment and Plan:     S/p ventral hernia repair  c/b intra-abd bleeding and shock  now s/p hematoma evacuation   Still on pressors, but extubated, awake    Acute posthemorrhagic anemia  H/o chronic ESRD-related anemia, usually hgb~11, it was 9.6 just prior surgery  S/p 2 PRBC  -trend Hgb  -FFP x2 ordered by surgery as well    leukocytosis   -ID consult     ESRD on HD  Hyperkaliemia this morning up to ~6, suspect from bleeding  S/p lokelma, insulin  -dialysis per nephrology    H/o HTN  DM2  -sliding insulin for now    HL  Peripheral nephropathy  CAD, h/o stents    DVT prophylaxis: SCD     Dispo: ICU       Chart reviewed, including current vitals, notes, labs and imaging  Pertinent past medical records reviewed  Labs ordered and medications adjusted as outlined above  Coordinate care with care team/consultants  Discussed with  patient and family at bedside results of tests, management plan as outlined above, and the need for ongoing hospitalization  D/w RN     MDM high  severe acute illness/or exacerbation of chronic illness posing a threat to life, IV medications, requiring close monitoring in hospital.       Results:     Lab Results   Component Value Date    WBC 13.8 (H) 01/19/2024    HGB 8.3 (L) 01/19/2024    HCT 25.5 (L) 01/19/2024    .0 01/19/2024    CREATSERUM 5.79 (H) 01/19/2024    BUN 30 (H) 01/19/2024     01/19/2024    K 6.0 (HH) 01/19/2024     (H) 01/19/2024    CO2 21.0 01/19/2024     (H) 01/19/2024    CA 7.9 (L) 01/19/2024    ALB 4.2 01/10/2024    ALKPHO 69 01/10/2024    BILT 0.5 01/10/2024    TP 7.5 01/10/2024    AST 19 01/10/2024    ALT 16 01/10/2024    INR 1.20 01/18/2024    TSH 2.960 07/13/2023     (H) 10/16/2023    PSA 0.11 07/13/2023    MG 1.8 01/19/2024    TROP <0.045 05/20/2021    ETOH <3 05/20/2021       CT ABDOMEN+PELVIS(CPT=74176)    Result Date: 1/18/2024  CONCLUSION:  1. Moderate amount of hemoperitoneum.  Extraperitoneal hemorrhage between the mesh and the repaired periumbilical abdominal wall.  Findings were discussed with Dr. Doyle. 2. Bibasilar atelectasis and or pneumonia.    Dictated by (CST): John Cooper MD on 1/18/2024 at 4:49 PM     Finalized by (CST): John Cooper MD on 1/18/2024 at 5:00 PM                 EDWIN PURVIS MD  1/19/2024

## 2024-01-19 NOTE — ANESTHESIA POSTPROCEDURE EVALUATION
Patient: Shane Hawthorne    Procedure Summary       Date: 01/18/24 Room / Location: [facility] MAIN OR 02 / [facility] MAIN OR    Anesthesia Start: 1804 Anesthesia Stop:     Procedure: DIAGNOSTIC LAPAROSCOPY evacutaion of intra-abdominal hemorrhage (Abdomen) Diagnosis:       Ventral hernia without obstruction or gangrene      (Ventral hernia without obstruction or gangrene [K43.9])    Surgeons: Pelon Gomes MD Anesthesiologist: Carlo Prakash MD    Anesthesia Type: general ASA Status: 4 - Emergent            Anesthesia Type: general    Vitals Value Taken Time   /74 01/18/24 1929   Temp 98.7 °F (37.1 °C) 01/18/24 1929   Pulse 103 01/18/24 1929   Resp 14 01/18/24 1929   SpO2 94 % 01/18/24 1929   Vitals shown include unfiled device data.    [facility] AN Post Evaluation:   Patient Evaluated in PACU  Patient Participation: complete - patient participated  Level of Consciousness: awake  Pain Management: adequate  Airway Patency:patent  Dental exam unchanged from preop  Yes    Cardiovascular Status: acceptable  Respiratory Status: acceptable  Postoperative Hydration acceptable  Comments: Awake, alert,pain is tolerable      CARLO PRAKASH MD  1/18/2024 7:29 PM

## 2024-01-20 LAB
ALBUMIN SERPL-MCNC: 3.2 G/DL (ref 3.2–4.8)
ANION GAP SERPL CALC-SCNC: 7 MMOL/L (ref 0–18)
APTT PPP: 31.4 SECONDS (ref 23.3–35.6)
BASOPHILS # BLD AUTO: 0.02 X10(3) UL (ref 0–0.2)
BASOPHILS NFR BLD AUTO: 0.2 %
BUN BLD-MCNC: 23 MG/DL (ref 9–23)
BUN/CREAT SERPL: 4.7 (ref 10–20)
CALCIUM BLD-MCNC: 8.1 MG/DL (ref 8.7–10.4)
CHLORIDE SERPL-SCNC: 102 MMOL/L (ref 98–112)
CO2 SERPL-SCNC: 28 MMOL/L (ref 21–32)
CREAT BLD-MCNC: 4.86 MG/DL
DEPRECATED RDW RBC AUTO: 58.6 FL (ref 35.1–46.3)
EGFRCR SERPLBLD CKD-EPI 2021: 13 ML/MIN/1.73M2 (ref 60–?)
EOSINOPHIL # BLD AUTO: 0.11 X10(3) UL (ref 0–0.7)
EOSINOPHIL NFR BLD AUTO: 1.3 %
ERYTHROCYTE [DISTWIDTH] IN BLOOD BY AUTOMATED COUNT: 17.8 % (ref 11–15)
GLUCOSE BLD-MCNC: 192 MG/DL (ref 70–99)
GLUCOSE BLDC GLUCOMTR-MCNC: 148 MG/DL (ref 70–99)
GLUCOSE BLDC GLUCOMTR-MCNC: 159 MG/DL (ref 70–99)
GLUCOSE BLDC GLUCOMTR-MCNC: 162 MG/DL (ref 70–99)
HCT VFR BLD AUTO: 19.9 %
HCT VFR BLD AUTO: 24 %
HGB BLD-MCNC: 6.6 G/DL
HGB BLD-MCNC: 7.3 G/DL
HGB BLD-MCNC: 7.7 G/DL
HGB BLD-MCNC: 7.7 G/DL
HGB BLD-MCNC: 7.8 G/DL
IMM GRANULOCYTES # BLD AUTO: 0.03 X10(3) UL (ref 0–1)
IMM GRANULOCYTES NFR BLD: 0.3 %
INR BLD: 1.06 (ref 0.8–1.2)
LYMPHOCYTES # BLD AUTO: 0.95 X10(3) UL (ref 1–4)
LYMPHOCYTES NFR BLD AUTO: 11.1 %
MAGNESIUM SERPL-MCNC: 1.7 MG/DL (ref 1.6–2.6)
MCH RBC QN AUTO: 30.4 PG (ref 26–34)
MCHC RBC AUTO-ENTMCNC: 33.2 G/DL (ref 31–37)
MCV RBC AUTO: 91.7 FL
MONOCYTES # BLD AUTO: 0.96 X10(3) UL (ref 0.1–1)
MONOCYTES NFR BLD AUTO: 11.2 %
NEUTROPHILS # BLD AUTO: 6.51 X10 (3) UL (ref 1.5–7.7)
NEUTROPHILS # BLD AUTO: 6.51 X10(3) UL (ref 1.5–7.7)
NEUTROPHILS NFR BLD AUTO: 75.9 %
OSMOLALITY SERPL CALC.SUM OF ELEC: 293 MOSM/KG (ref 275–295)
PHOSPHATE SERPL-MCNC: 3.8 MG/DL (ref 2.4–5.1)
PLATELET # BLD AUTO: 112 10(3)UL (ref 150–450)
POTASSIUM SERPL-SCNC: 3.8 MMOL/L (ref 3.5–5.1)
PROTHROMBIN TIME: 14.5 SECONDS (ref 11.6–14.8)
RBC # BLD AUTO: 2.17 X10(6)UL
SODIUM SERPL-SCNC: 137 MMOL/L (ref 136–145)
WBC # BLD AUTO: 8.6 X10(3) UL (ref 4–11)

## 2024-01-20 PROCEDURE — 99233 SBSQ HOSP IP/OBS HIGH 50: CPT | Performed by: HOSPITALIST

## 2024-01-20 PROCEDURE — 99233 SBSQ HOSP IP/OBS HIGH 50: CPT | Performed by: INTERNAL MEDICINE

## 2024-01-20 RX ORDER — SODIUM CHLORIDE 9 MG/ML
INJECTION, SOLUTION INTRAVENOUS ONCE
Status: COMPLETED | OUTPATIENT
Start: 2024-01-20 | End: 2024-01-20

## 2024-01-20 RX ORDER — METOPROLOL TARTRATE 1 MG/ML
5 INJECTION, SOLUTION INTRAVENOUS EVERY 6 HOURS PRN
Status: DISCONTINUED | OUTPATIENT
Start: 2024-01-20 | End: 2024-01-23

## 2024-01-20 RX ORDER — FUROSEMIDE 10 MG/ML
40 INJECTION INTRAMUSCULAR; INTRAVENOUS ONCE
Status: COMPLETED | OUTPATIENT
Start: 2024-01-20 | End: 2024-01-20

## 2024-01-20 RX ORDER — SODIUM CHLORIDE 9 MG/ML
INJECTION, SOLUTION INTRAVENOUS ONCE
Status: DISCONTINUED | OUTPATIENT
Start: 2024-01-20 | End: 2024-01-21

## 2024-01-20 NOTE — PROGRESS NOTES
[facility]    Progress Note    Shane Hawthorne Patient Status:  Inpatient    7/10/1970 MRN [id number]   Location [facility] 2W/SW Attending Gina Garcia MD   Hosp Day # 2 PCP Jaycob Aleman MD     Subjective:    POD 3 lap VHR, POD 2 diagnostic lap, hematoma evacuation. Overnight hgb 6.6 > 6.9 > 8. Received 2u PRBC. BP stable off pressors.     Objective:    Vital Signs:  Blood pressure 139/80, pulse 112, temperature 98 °F (36.7 °C), temperature source Temporal, resp. rate 25, height 5' 9\" (1.753 m), weight 189 lb (85.7 kg), SpO2 98%.     Gen: awake, alert  HEENT: traumatic, normocephalic  Pulm: no increased WOB, symmetric chest rise  CV: regular rate and rhythm   Abdo: soft, ND, minimally TTP, incisions c/d/I, drain SS  Skin: no obvious rashes, lesions  Psych: normal mood, affect  Neuro: no focal deficits, alert & oriented      Results:    Lab Results   Component Value Date    WBC 8.6 2024    HGB 6.6 (LL) 2024    HCT 19.9 (L) 2024    .0 (L) 2024    CREATSERUM 4.86 (H) 2024    BUN 23 2024     2024    K 3.8 2024     2024    CO2 28.0 2024     (H) 2024    CA 8.1 (L) 2024    ALB 3.2 2024    ALKPHO 69 01/10/2024    BILT 0.5 01/10/2024    TP 7.5 01/10/2024    AST 19 01/10/2024    ALT 16 01/10/2024    INR 1.20 2024    TSH 2.960 2023     (H) 10/16/2023    PSA 0.11 2023    MG 1.8 2024    PHOS 3.8 2024    TROP <0.045 2021    ETOH <3 2021       CT ABDOMEN+PELVIS(CPT=74176)    Result Date: 2024  CONCLUSION:  1. Moderate amount of hemoperitoneum.  Extraperitoneal hemorrhage between the mesh and the repaired periumbilical abdominal wall.  Findings were discussed with Dr. Doyle. 2. Bibasilar atelectasis and or pneumonia.    Dictated by (CST): John Cooper MD on 2024 at 4:49 PM     Finalized by (CST): John Cooper MD on 2024 at 5:00  PM               Assessment & Plan:      53M POD 3 lap VHR, POD 2 diagnostic lap, hematoma evacuation.    - serial hgb to monitor for response to transfusion  - keep NPO while hgb normalizing   - monitor drain output  - hold DVT chemoprophylaxis  - serial abdominal exams    Discussed with Dr. Holly Higginbotham PGY4  General Surgery

## 2024-01-20 NOTE — PHYSICAL THERAPY NOTE
PT orders recd, chart reviewed.  Pt Hgb 6.6 today, spoke with rn who reports pt will be transfused.  Will hold therapy for now, will re attempt when appropriate.

## 2024-01-20 NOTE — PROGRESS NOTES
INFECTIOUS DISEASE PROGRESS NOTE    Shane Hawthorne Patient Status:  Inpatient    7/10/1970 MRN [id number]   Location [facility] 2W/SW Attending Gina Garcia MD   Hosp Day # 2 PCP Jaycob Aleman MD     SUBJECTIVE  Afebrile, stable. Hb 7.8    ASSESSMENT/PLAN:    Antibiotics: ancef x 1 dose pre-op     ASSESSMENT:     # S/p ventral hernia repair  c/b intra-abd bleeding now s/p hematoma evacuation   # leukocytosis likely reactive  # hypotensive shock in the setting of acute bleed  # ESRD on HD     PLAN:     -  continue to monitor off abx.    -  if febrile, will collect blood cx x2.   -  surgery following  -  Follow fever curve, wbc  -  Reviewed labs, micro, imaging reports, available old records    OBJECTIVE  /73   Pulse 114   Temp 98 °F (36.7 °C)   Resp 20   Ht 5' 9\" (1.753 m)   Wt 213 lb 13.5 oz (97 kg)   SpO2 94%   BMI 31.58 kg/m²     General: No acute distress. Alert.  HEENT: EOMI   Abdomen: Abdominal binder in place, MARYELLEN drain  Musculoskeletal: No edema  Integument: No rashes  R chest permacath    Labs:     Recent Labs   Lab 24  19324  2301 24  0316 24  1049 24  1517 24  2228 24  0311 24  0758 24  1351   WBC 12.1* 10.8 13.8*  --   --   --  8.6  --   --    HGB 10.6* 8.5* 9.0*   < > 8.3* 6.9* 6.6* 7.7* 7.8*   .0 137.0* 195.0  --   --   --  112.0*  --   --     < > = values in this interval not displayed.       Recent Labs   Lab 24  0917 24  1712 24  1934 24  0316 24  0311   NA  --  140 139 140 137   K 4.4 5.5* 5.3*  5.3* 6.0* 3.8   CL  --  110 112 113* 102   CO2  --  27.0 22.0 21.0 28.0   BUN  --  30* 26* 30* 23   CREATSERUM  --  5.34* 5.20* 5.79* 4.86*       No results for input(s): \"ALT\", \"AST\" in the last 168 hours.    Invalid input(s): \"ALPHOS\", \"TBIL\"    Microbiology: Reviewed    Imaging: Reviewed       MD [facility]  [phone]

## 2024-01-20 NOTE — PROGRESS NOTES
[facility]    Progress Note    Shane Hawthorne Patient Status:  Inpatient    7/10/1970 MRN [id number]   Location [facility] 2W/SW Attending Gina Garcia MD   Hosp Day # 2 PCP Jaycob Aleman MD       Subjective:   Shane Hawthorne is a(n) 53 year old male     ROS:     Constitutional: Is weak  ENMT:  Negative for ear drainage, hearing loss and nasal drainage  Eyes:  Negative for eye discharge and vision loss  Cardiovascular:  Negative for chest pain, sob, irregular heartbeat/palpitations  Respiratory:  Negative for cough, dyspnea and wheezing  Gastrointestinal:  Negative for abdominal pain, constipation, decreased appetite, diarrhea and vomiting  Genitourinary:  Negative for dysuria and hematuria  Endocrine:  Negative for abnormal sleep patterns, increased activity, polydipsia and polyphagia  Hema/Lymph:  Negative for easy bleeding and easy bruising  Integumentary:  Negative for pruritus and rash  Musculoskeletal:  Negative for bone/joint symptoms  Neurological:  Negative for gait disturbance  Psychiatric:  Negative for inappropriate interaction and psychiatric symptoms      Vitals:    24 1600   BP: 116/73   Pulse: 114   Resp:    Temp:            PHYSICAL EXAM:   Constitutional: appears well hydrated alert and responsive no acute distress noted  Head/Face: normocephalic  Eyes/Vision: normal extraocular motion is intact  Nose/Mouth/Throat:mucous membranes are moist and no oral lesions are noted  Neck/Thyroid: neck is supple without adenopathy  Lymphatic: no abnormal cervical, supraclavicular adenopathy is noted  Respiratory:  lungs are clear to auscultation bilaterally, normal respiratory effort  Cardiovascular: regular rate and rhythm no murmurs, gallups, or rubs  Abdomen: Abdominal dressing on  Vascular: well perfused femoral, and pedal pulses normal  Skin/Hair: no unusual rashes present, no abnormal bruising noted  Back/Spine: no abnormalities noted  Musculoskeletal: full ROM all  extremities good strength  no deformities  Extremities: no edema, cyanosis  Neurological:  Grossly normal    Results:     Laboratory Data:  Lab Results   Component Value Date    WBC 8.6 01/20/2024    HGB 7.8 (L) 01/20/2024    HCT 19.9 (L) 01/20/2024    .0 (L) 01/20/2024    CREATSERUM 4.86 (H) 01/20/2024    BUN 23 01/20/2024     01/20/2024    K 3.8 01/20/2024     01/20/2024    CO2 28.0 01/20/2024     (H) 01/20/2024    CA 8.1 (L) 01/20/2024    ALB 3.2 01/20/2024    ALKPHO 69 01/10/2024    BILT 0.5 01/10/2024    TP 7.5 01/10/2024    AST 19 01/10/2024    ALT 16 01/10/2024    INR 1.20 01/18/2024    TSH 2.960 07/13/2023     (H) 10/16/2023    PSA 0.11 07/13/2023    MG 1.7 01/20/2024    PHOS 3.8 01/20/2024    TROP <0.045 05/20/2021    ETOH <3 05/20/2021       Imaging:  @SetJamIMAGING@   CT ABDOMEN+PELVIS(CPT=74176)    Result Date: 1/18/2024  CONCLUSION:  1. Moderate amount of hemoperitoneum.  Extraperitoneal hemorrhage between the mesh and the repaired periumbilical abdominal wall.  Findings were discussed with Dr. Doyle. 2. Bibasilar atelectasis and or pneumonia.    Dictated by (CST): John Cooper MD on 1/18/2024 at 4:49 PM     Finalized by (CST): John Cooper MD on 1/18/2024 at 5:00 PM             ASSESSMENT/PLAN:   Assessment       1 ventral hernia repair with complications requiring more blood today we will recheck hemoglobin at 6 PM gave him DDAVP to help with clotting and platelet function  Hemoglobin went down to 6.6     #2 hypertension  #3 diabetes  #4 ESRD dialysis Monday discussed with nurse Mona        1/20/2024  Modesto Ayala MD

## 2024-01-20 NOTE — PROGRESS NOTES
[facility]    Progress Note    Shane Hawthorne Patient Status:  Inpatient    7/10/1970 MRN [id number]   Location [facility] 2W/SW Attending Gina Garcia MD   Hosp Day # 2 PCP Jaycob Aleman MD     Chief complaint: shock  Subjective:     Not feeling well  c/o being \"always cold\"  Off levophed  No urine output, bladder scan ~450 ml, but pt refused straight cath initially, repeat >700, luis placed  Bloody output from MARYELLEN         Objective:   Blood pressure 121/73, pulse 104, temperature 98 °F (36.7 °C), temperature source Temporal, resp. rate 20, height 5' 9\" (1.753 m), weight 213 lb 13.5 oz (97 kg), SpO2 97%.    GEN: acutely and chronically ill, pale, but interactive, fully awake  HEENT: conjunctivae/corneas clear. PERRL, EOM's intact.  Neck: no adenopathy, no carotid bruit, no JVD, supple  Pulmonary:  clear to auscultation bilaterally  Cardiovascular: S1, S2 normal, no murmur, click, rub or gallop, regular rate and rhythm  Abdominal: soft, tender; bowel sounds normal;    Extremities: no cyanosis or edema  Pulses: palpable and symmetric  Skin: warm  Neurologic: Alert and oriented X 3, moves all extremities   Psychiatric: calm, cooperative    Assessment and Plan:     S/p ventral hernia repair  c/b intra-abd bleeding and hypovolemic shock  now s/p hematoma evacuation   Initially on pressors, now off  extubated, awake  Still some bloody output from MARYELLEN  D/w surgery (Nga), will continue transfusions for now, including FFP, and closely monitor, keep NPO    Acute posthemorrhagic anemia resulted in hypovolemic shock  H/o chronic ESRD-related anemia, usually hgb~11, it was 9.6 just prior surgery  S/p 2 PRBC initially post surgery  S/p FFP x2 yesterday   An additional PRBC the last night after Hgb drop to 6.6 with improvement of Hgb up to 7.7  -transfuse one more PRBC because of ongoing bleed  -transfuse another FFP  -current PLT~112, recheck post PRBC/FFP, might need plt transfusions  as well if ongoing bleed  -trend Hgb    Leukocytosis, reactive, resolved  -seen by ID consult, no need for Abx at present     ESRD on HD  Hyperkaliemia the morning post op up to ~6, suspect from bleeding  S/p lokelma, insulin, and dialysis-->resolved  -dialysis per nephrology    Acute urinary retention  bladder scan ~450 ml, but pt refused straight cath initially, repeat >700 ml-->luis placed 1/20      H/o HTN, meds on hold    DM2  -sliding insulin for now    HL  Peripheral nephropathy  CAD, h/o stents    DVT prophylaxis: SCD     Dispo: keep in ICU       Chart reviewed, including current vitals, notes, labs and imaging  Pertinent past medical records reviewed  Labs ordered and medications adjusted as outlined above  Coordinate care with care team/consultants  Discussed with patient and family at bedside results of tests, management plan as outlined above, and the need for ongoing hospitalization  D/w RN     MDM high  severe acute illness/or exacerbation of chronic illness posing a threat to life, IV medications, requiring close monitoring in hospital.       Results:     Lab Results   Component Value Date    WBC 8.6 01/20/2024    HGB 7.7 (L) 01/20/2024    HCT 19.9 (L) 01/20/2024    .0 (L) 01/20/2024    CREATSERUM 4.86 (H) 01/20/2024    BUN 23 01/20/2024     01/20/2024    K 3.8 01/20/2024     01/20/2024    CO2 28.0 01/20/2024     (H) 01/20/2024    CA 8.1 (L) 01/20/2024    ALB 3.2 01/20/2024    ALKPHO 69 01/10/2024    BILT 0.5 01/10/2024    TP 7.5 01/10/2024    AST 19 01/10/2024    ALT 16 01/10/2024    INR 1.20 01/18/2024    TSH 2.960 07/13/2023     (H) 10/16/2023    PSA 0.11 07/13/2023    MG 1.8 01/19/2024    PHOS 3.8 01/20/2024    TROP <0.045 05/20/2021    ETOH <3 05/20/2021       CT ABDOMEN+PELVIS(CPT=74176)    Result Date: 1/18/2024  CONCLUSION:  1. Moderate amount of hemoperitoneum.  Extraperitoneal hemorrhage between the mesh and the repaired periumbilical abdominal wall.   Findings were discussed with Dr. Doyle. 2. Bibasilar atelectasis and or pneumonia.    Dictated by (CST): John Cooper MD on 1/18/2024 at 4:49 PM     Finalized by (CST): John Cooper MD on 1/18/2024 at 5:00 PM                 EDWIN PURVIS MD  1/20/2024

## 2024-01-20 NOTE — CM/SW NOTE
Shane is ESRD and receives OP HD.  Patient with a recent lap hernia repair.  Infectious Disease is following him at this time in the PCU.    CM/SW will follow and provide assistance if any dc needs are present at dc.    / to remain available for support and/or discharge planning.      Ct Arriaga MBA BSN RN CRRN   RN Case Manager  [phone]

## 2024-01-20 NOTE — CONSULTS
[facility]    Report of Consultation    Shane Hawthorne Patient Status:  Inpatient    7/10/1970 MRN [id number]   Location [facility] 2W/SW Attending Gina Garcia MD   Hosp Day # 1 PCP Jaycob Aleman MD     Date of Admission:  2024  Date of Consult:  2024  Reason for Consultation:   End-stage renal disease  History of Present Illness:   Patient is a 53 year old male who was admitted to the hospital for <principal problem not specified>:  Patient dialyzes  here at [facility] history of hypertension diabetes been on dialysis for about a year came in for abdominal ventral hernia surgery had complications dropped hemoglobin drop blood pressure  Hemoglobin went from 10.6-7.7    Received blood and plasma  Platelets okay stable in ICU  Past Medical History  Past Medical History:   Diagnosis Date    Allergic rhinitis 2000    Asthma     Atherosclerosis of coronary artery     Cataract     Colon polyps     Diabetes (HCC) 1995    Diabetes mellitus, type II (HCC)     End stage renal disease on dialysis (formerly [facility]) 2022    Essential hypertension     Hernia, abdominal     High blood pressure     High cholesterol     Hyperlipidemia     Hypertension     Macular edema     multiple Eylea injections- Dr. Marquis    Neuropathy     Obesity 1984    Renal disorder        Past Surgical History  Past Surgical History:   Procedure Laterality Date    APPENDECTOMY  1974    CATARACT  2022    COLONOSCOPY  2021    COLONOSCOPY      CORONARY STENT PLACEMENT  08/09/2022    x 2, Dr. Narayan    EYE SURGERY Bilateral     HAND/FINGER SURGERY UNLISTED Right 2018    ORIF    IR TUNNELED DIALYSIS CATHETER  2022    TOE SURGERY Right     great toe    TONSILLECTOMY  1978       Family History  Family History   Problem Relation Age of Onset    No Known Problems Father     Heart Disorder Mother     Asthma Mother     Diabetes Maternal Grandmother     Glaucoma Neg      Macular degeneration Neg        Social History  Social History     Socioeconomic History    Marital status:      Spouse name: Not on file    Number of children: 3    Years of education: Not on file    Highest education level: Not on file   Occupational History    Occupation: [employer]    Tobacco Use    Smoking status: Former    Smokeless tobacco: Never    Tobacco comments:     Quit due to asthma aprox 25 years ago   Vaping Use    Vaping Use: Never used   Substance and Sexual Activity    Alcohol use: Not Currently     Alcohol/week: 2.0 standard drinks of alcohol     Types: 2 Cans of beer per week    Drug use: Yes     Frequency: 7.0 times per week     Types: Cannabis     Comment: Smokes marijuana to stimulate appetite    Sexual activity: Not on file   Other Topics Concern    Not on file   Social History Narrative    Not on file     Social Determinants of Health     Financial Resource Strain: Not on file   Food Insecurity: Not on file   Transportation Needs: Not on file   Physical Activity: Not on file   Stress: Not on file   Social Connections: Not on file   Housing Stability: Not on file          Current Medications:  Current Facility-Administered Medications   Medication Dose Route Frequency    heparin (Porcine) 1000 UNIT/ML injection 1,500 Units  1.5 mL Intravenous PRN Dialysis    sodium chloride 0.9 % IV bolus 100 mL  100 mL Intravenous Q30 Min PRN    And    albumin human (Albumin) 25% injection 25 g  25 g Intravenous PRN Dialysis    benzonatate (Tessalon) cap 100 mg  100 mg Oral TID PRN    dextrose 50% injection 50 mL  50 mL Intravenous PRN    dextrose 5%-sodium chloride 0.9% infusion   Intravenous Continuous    insulin aspart (NovoLOG) 100 Units/mL FlexPen 1-5 Units  1-5 Units Subcutaneous TID CC    albuterol (Ventolin) (2.5 MG/3ML) 0.083% nebulizer solution 2.5 mg  2.5 mg Nebulization Q4H PRN    atorvastatin (Lipitor) tab 40 mg  40 mg Oral Nightly    midodrine (ProAmatine) tab 5 mg   5 mg Oral Q MWF    [START ON 1/20/2024] multivitamin (Dialyvite - Renal) tab 1 tablet  1 tablet Oral Daily    sevelamer carbonate (Renvela) tab 800 mg  800 mg Oral TID    sodium chloride 0.9% infusion   Intravenous Continuous    acetaminophen (Tylenol) tab 650 mg  650 mg Oral Q4H PRN    Or    HYDROcodone-acetaminophen (Norco) 5-325 MG per tab 1 tablet  1 tablet Oral Q4H PRN    Or    HYDROcodone-acetaminophen (Norco) 5-325 MG per tab 2 tablet  2 tablet Oral Q4H PRN    HYDROmorphone (Dilaudid) 1 MG/ML injection 0.2 mg  0.2 mg Intravenous Q2H PRN    Or    HYDROmorphone (Dilaudid) 1 MG/ML injection 0.4 mg  0.4 mg Intravenous Q2H PRN    Or    HYDROmorphone (Dilaudid) 1 MG/ML injection 0.8 mg  0.8 mg Intravenous Q2H PRN    ondansetron (Zofran) 4 MG/2ML injection 4 mg  4 mg Intravenous Q6H PRN    prochlorperazine (Compazine) 10 MG/2ML injection 5 mg  5 mg Intravenous Q8H PRN    temazepam (Restoril) cap 15 mg  15 mg Oral Nightly PRN    sodium chloride 0.9% infusion   Intravenous Once    norepinephrine (Levophed) 4 mg/250mL infusion premix  0.5-30 mcg/min Intravenous Continuous    sodium chloride 0.9% infusion   Intravenous Once     Medications Prior to Admission   Medication Sig    NEPHRO-CARLI 0.8 MG Oral Tab TAKE 1 TABLET BY MOUTH EVERY DAY    carvedilol 12.5 MG Oral Tab Take 1 tablet (12.5 mg total) by mouth 2 (two) times daily.    furosemide 80 MG Oral Tab Take 1 tablet (80 mg total) by mouth 2 (two) times daily.    glipiZIDE 10 MG Oral Tab Take 1 tablet (10 mg total) by mouth 2 (two) times daily before meals.    ALBUTEROL 108 (90 Base) MCG/ACT Inhalation Aero Soln INHALE 2 PUFFS INTO THE LUNGS EVERY 6 HOURS AS NEEDED FOR WHEEZING FOR UP TO 30 DAYS.    atorvastatin 40 MG Oral Tab Take 1 tablet (40 mg total) by mouth nightly.    aspirin 81 MG Oral Tab EC Take 1 tablet (81 mg total) by mouth daily.    Sevelamer 800 MG Oral Tab Take 1 tablet (800 mg total) by mouth 3 (three) times daily.    albuterol (2.5 MG/3ML) 0.083%  Inhalation Nebu Soln Take 3 mL (2.5 mg total) by nebulization every 4 (four) hours as needed for Wheezing or Shortness of Breath.    [] metoclopramide 10 MG Oral Tab Take 1 tablet (10 mg total) by mouth 3 (three) times daily as needed.    [] metoclopramide 10 MG Oral Tab Take 1 tablet (10 mg total) by mouth 3 (three) times daily as needed.    MIDODRINE 5 MG Oral Tab TAKE 1 TABLET BY MOUTH 1 HOUR BEFORE THE END OF DIALYSIS    [DISCONTINUED] ketorolac 0.5 % Ophthalmic Solution  (Patient not taking: Reported on 2024)       Allergies  No Known Allergies      Review of Systems:   Constitutional: negative for fatigue, fevers and weight loss  Eyes: negative for irritation, redness and visual disturbance  Ears, nose, mouth, throat, and face: negative for hearing loss and sore throat  Respiratory: negative for cough, hemoptysis and wheezing  Cardiovascular: negative for chest pain, exertional dyspnea, lower extremity edema and palpitations  Gastrointestinal: Tenderness around abdomen  Genitourinary:negative for dysuria, frequency and hematuria  Hematologic/lymphatic: negative for bleeding and easy bruising  Musculoskeletal:negative for back pain, bone pain and muscle weakness  Neurological: negative for gait problems, memory problems and seizures  Behavioral/Psych: negative for anxiety and depression  Endocrine: negative for polyuria and weight loss     Physical Exam:   Blood pressure 106/57, pulse 104, temperature 98 °F (36.7 °C), resp. rate 18, height 5' 9\" (1.753 m), weight 189 lb (85.7 kg), SpO2 94%.    Intake/Output Summary (Last 24 hours) at 2024 1914  Last data filed at 2024 1704  Gross per 24 hour   Intake 2247.3 ml   Output 220 ml   Net 2027.3 ml     Wt Readings from Last 3 Encounters:   24 189 lb (85.7 kg)   24 196 lb (88.9 kg)   23 196 lb (88.9 kg)       General appearance: alert, appears stated age and cooperative  Head: Normocephalic, atraumatic  Eyes:  conjunctivae/corneas clear  Throat: lips, mucosa, and tongue normal; teeth and gums normal  Neck:  no JVD, supple, symmetrical  Pulmonary:  clear to auscultation bilaterally  Cardiovascular: S1, S2 normal, no rub or gallop, regular rate and rhythm  Abdominal: Abdominal bandage and binder on  Extremities: extremities normal, no edema  Pulses: pedal pulses palpable  Skin: No rashes or lesions  Lymph nodes: Cervical, supraclavicular normal.  Neurologic: Grossly normal  Psychiatric: calm    Results:     Laboratory Data:  Lab Results   Component Value Date    WBC 13.8 (H) 01/19/2024    HGB 8.3 (L) 01/19/2024    HCT 25.5 (L) 01/19/2024    .0 01/19/2024    CREATSERUM 5.79 (H) 01/19/2024    BUN 30 (H) 01/19/2024     01/19/2024    K 6.0 (HH) 01/19/2024     (H) 01/19/2024    CO2 21.0 01/19/2024     (H) 01/19/2024    CA 7.9 (L) 01/19/2024    ALB 4.2 01/10/2024    ALKPHO 69 01/10/2024    BILT 0.5 01/10/2024    TP 7.5 01/10/2024    AST 19 01/10/2024    ALT 16 01/10/2024    INR 1.20 01/18/2024    TSH 2.960 07/13/2023     (H) 10/16/2023    PSA 0.11 07/13/2023    MG 1.8 01/19/2024    TROP <0.045 05/20/2021    ETOH <3 05/20/2021       Imaging:  [unfilled]   CT ABDOMEN+PELVIS(CPT=74176)    Result Date: 1/18/2024  CONCLUSION:  1. Moderate amount of hemoperitoneum.  Extraperitoneal hemorrhage between the mesh and the repaired periumbilical abdominal wall.  Findings were discussed with Dr. Doyle. 2. Bibasilar atelectasis and or pneumonia.    Dictated by (CST): John Cooper MD on 1/18/2024 at 4:49 PM     Finalized by (CST): John Cooper MD on 1/18/2024 at 5:00 PM                Impression:     Patient Active Problem List   Diagnosis    ESRD (end stage renal disease) on dialysis (HCC)    Primary hypertension    Type 2 diabetes mellitus with right eye affected by mild nonproliferative retinopathy without macular edema, without long-term current use of insulin ([facility])    Hypercholesterolemia     Ventral hernia without obstruction or gangrene    Coronary artery disease involving native coronary artery of native heart without angina pectoris    Posterior subcapsular age-related cataract of both eyes    Abdominal pain    Post-operative pain    ESRD on hemodialysis (HCC)        Recommendations:  #1 ESRD had dialysis today need to get on Monday    #2 ventral hernia repair with complications intra-abdominal bleeding received 3 units of blood received FFP defer to surgery and ICU    #3 hypotension resolved  On IV fluids at 50 cc/h    #4 diabetes stable discussed with wife and patient and nurse Mona    Thank you for allowing me to participate in the care of your patient.    Modesto Ayala MD  1/19/2024

## 2024-01-20 NOTE — PLAN OF CARE
Pt A&OX4 on 2L NC. Pain reported throughout the night treated with prns. Levo gtt cont. Leaking from MARYELLEN drain MD and surgeon notified. Repeat hbg 6.9 then 6.6, 1 unit PRCB given. No urine output since admission, bladder scanned x2 max 356. Bed locked in lowest positon, call light within reach. Family updated on plan of care, safety maintained.   Problem: Patient Centered Care  Goal: Patient preferences are identified and integrated in the patient's plan of care  Description: Interventions:  - What would you like us to know as we care for you?   - Provide timely, complete, and accurate information to patient/family  - Incorporate patient and family knowledge, values, beliefs, and cultural backgrounds into the planning and delivery of care  - Encourage patient/family to participate in care and decision-making at the level they choose  - Honor patient and family perspectives and choices  1/20/2024 0031 by Yadi Camarena RN  Outcome: Progressing  1/20/2024 0030 by Yadi Camarena RN  Outcome: Progressing     Problem: Diabetes/Glucose Control  Goal: Glucose maintained within prescribed range  Description: INTERVENTIONS:  - Monitor Blood Glucose as ordered  - Assess for signs and symptoms of hyperglycemia and hypoglycemia  - Administer ordered medications to maintain glucose within target range  - Assess barriers to adequate nutritional intake and initiate nutrition consult as needed  - Instruct patient on self management of diabetes  1/20/2024 0031 by Yadi Camarena RN  Outcome: Progressing  1/20/2024 0030 by Yadi Camarena RN  Outcome: Progressing     Problem: Patient/Family Goals  Goal: Patient/Family Long Term Goal  Description: Patient's Long Term Goal:     Interventions:  -   - See additional Care Plan goals for specific interventions  1/20/2024 0031 by Yadi Camarena, RN  Outcome: Progressing  1/20/2024 0030 by Yadi Camarena, RN  Outcome: Progressing  Goal: Patient/Family Short Term  Goal  Description: Patient's Short Term Goal:     Interventions:   -   - See additional Care Plan goals for specific interventions  1/20/2024 0031 by Yadi Camarena, RN  Outcome: Progressing  1/20/2024 0030 by Yadi Camarena, RN  Outcome: Progressing     Problem: PAIN - ADULT  Goal: Verbalizes/displays adequate comfort level or patient's stated pain goal  Description: INTERVENTIONS:  - Encourage pt to monitor pain and request assistance  - Assess pain using appropriate pain scale  - Administer analgesics based on type and severity of pain and evaluate response  - Implement non-pharmacological measures as appropriate and evaluate response  - Consider cultural and social influences on pain and pain management  - Manage/alleviate anxiety  - Utilize distraction and/or relaxation techniques  - Monitor for opioid side effects  - Notify MD/LIP if interventions unsuccessful or patient reports new pain  - Anticipate increased pain with activity and pre-medicate as appropriate  Outcome: Progressing     Problem: RESPIRATORY - ADULT  Goal: Achieves optimal ventilation and oxygenation  Description: INTERVENTIONS:  - Assess for changes in respiratory status  - Assess for changes in mentation and behavior  - Position to facilitate oxygenation and minimize respiratory effort  - Oxygen supplementation based on oxygen saturation or ABGs  - Provide Smoking Cessation handout, if applicable  - Encourage broncho-pulmonary hygiene including cough, deep breathe, Incentive Spirometry  - Assess the need for suctioning and perform as needed  - Assess and instruct to report SOB or any respiratory difficulty  - Respiratory Therapy support as indicated  - Manage/alleviate anxiety  - Monitor for signs/symptoms of CO2 retention  Outcome: Progressing     Problem: SKIN/TISSUE INTEGRITY - ADULT  Goal: Skin integrity remains intact  Description: INTERVENTIONS  - Assess and document risk factors for pressure ulcer development  - Assess and document  skin integrity  - Monitor for areas of redness and/or skin breakdown  - Initiate interventions, skin care algorithm/standards of care as needed  Outcome: Progressing  Goal: Incision(s), wounds(s) or drain site(s) healing without S/S of infection  Description: INTERVENTIONS:  - Assess and document risk factors for pressure ulcer development  - Assess and document skin integrity  - Assess and document dressing/incision, wound bed, drain sites and surrounding tissue  - Implement wound care per orders  - Initiate isolation precautions as appropriate  - Initiate Pressure Ulcer prevention bundle as indicated  Outcome: Progressing     Problem: HEMATOLOGIC - ADULT  Goal: Maintains hematologic stability  Description: INTERVENTIONS  - Assess for signs and symptoms of bleeding or hemorrhage  - Monitor labs and vital signs for trends  - Administer supportive blood products/factors, fluids and medications as ordered and appropriate  - Administer supportive blood products/factors as ordered and appropriate  Outcome: Progressing  Goal: Free from bleeding injury  Description: (Example usage: patient with low platelets)  INTERVENTIONS:  - Avoid intramuscular injections, enemas and rectal medication administration  - Ensure safe mobilization of patient  - Hold pressure on venipuncture sites to achieve adequate hemostasis  - Assess for signs and symptoms of internal bleeding  - Monitor lab trends  - Patient is to report abnormal signs of bleeding to staff  - Avoid use of toothpicks and dental floss  - Use electric shaver for shaving  - Use soft bristle tooth brush  - Limit straining and forceful nose blowing  Outcome: Progressing

## 2024-01-21 LAB
ALBUMIN SERPL-MCNC: 3.4 G/DL (ref 3.2–4.8)
ANION GAP SERPL CALC-SCNC: 8 MMOL/L (ref 0–18)
ANTIBODY SCREEN: NEGATIVE
BLOOD TYPE BARCODE: 5100
BLOOD TYPE BARCODE: 7300
BUN BLD-MCNC: 29 MG/DL (ref 9–23)
BUN/CREAT SERPL: 4.7 (ref 10–20)
CALCIUM BLD-MCNC: 8.3 MG/DL (ref 8.7–10.4)
CHLORIDE SERPL-SCNC: 103 MMOL/L (ref 98–112)
CO2 SERPL-SCNC: 26 MMOL/L (ref 21–32)
CREAT BLD-MCNC: 6.11 MG/DL
DEPRECATED RDW RBC AUTO: 55.5 FL (ref 35.1–46.3)
EGFRCR SERPLBLD CKD-EPI 2021: 10 ML/MIN/1.73M2 (ref 60–?)
ERYTHROCYTE [DISTWIDTH] IN BLOOD BY AUTOMATED COUNT: 16.9 % (ref 11–15)
GLUCOSE BLD-MCNC: 142 MG/DL (ref 70–99)
GLUCOSE BLDC GLUCOMTR-MCNC: 132 MG/DL (ref 70–99)
GLUCOSE BLDC GLUCOMTR-MCNC: 159 MG/DL (ref 70–99)
GLUCOSE BLDC GLUCOMTR-MCNC: 165 MG/DL (ref 70–99)
GLUCOSE BLDC GLUCOMTR-MCNC: 171 MG/DL (ref 70–99)
GLUCOSE BLDC GLUCOMTR-MCNC: 200 MG/DL (ref 70–99)
HCT VFR BLD AUTO: 24.6 %
HGB BLD-MCNC: 8 G/DL
HGB BLD-MCNC: 8 G/DL
HGB BLD-MCNC: 8.1 G/DL
HGB BLD-MCNC: 8.7 G/DL
MCH RBC QN AUTO: 29.6 PG (ref 26–34)
MCHC RBC AUTO-ENTMCNC: 32.5 G/DL (ref 31–37)
MCV RBC AUTO: 91.1 FL
OSMOLALITY SERPL CALC.SUM OF ELEC: 292 MOSM/KG (ref 275–295)
PHOSPHATE SERPL-MCNC: 4.9 MG/DL (ref 2.4–5.1)
PLATELET # BLD AUTO: 106 10(3)UL (ref 150–450)
POTASSIUM SERPL-SCNC: 3.8 MMOL/L (ref 3.5–5.1)
RBC # BLD AUTO: 2.7 X10(6)UL
RH BLOOD TYPE: POSITIVE
SODIUM SERPL-SCNC: 137 MMOL/L (ref 136–145)
UNIT VOLUME: 285 ML
UNIT VOLUME: 296 ML
UNIT VOLUME: 350 ML
UNIT VOLUME: 350 ML
WBC # BLD AUTO: 7 X10(3) UL (ref 4–11)

## 2024-01-21 PROCEDURE — 99233 SBSQ HOSP IP/OBS HIGH 50: CPT | Performed by: HOSPITALIST

## 2024-01-21 PROCEDURE — 99233 SBSQ HOSP IP/OBS HIGH 50: CPT | Performed by: INTERNAL MEDICINE

## 2024-01-21 RX ORDER — ALBUMIN (HUMAN) 12.5 G/50ML
25 SOLUTION INTRAVENOUS
Status: ACTIVE | OUTPATIENT
Start: 2024-01-21 | End: 2024-01-22

## 2024-01-21 RX ORDER — TAMSULOSIN HYDROCHLORIDE 0.4 MG/1
0.4 CAPSULE ORAL
Status: DISCONTINUED | OUTPATIENT
Start: 2024-01-21 | End: 2024-01-23

## 2024-01-21 RX ORDER — CARVEDILOL 6.25 MG/1
6.25 TABLET ORAL 2 TIMES DAILY WITH MEALS
Status: DISCONTINUED | OUTPATIENT
Start: 2024-01-21 | End: 2024-01-22

## 2024-01-21 RX ORDER — HEPARIN SODIUM 1000 [USP'U]/ML
1.5 INJECTION, SOLUTION INTRAVENOUS; SUBCUTANEOUS
Status: DISCONTINUED | OUTPATIENT
Start: 2024-01-21 | End: 2024-01-21

## 2024-01-21 NOTE — PROGRESS NOTES
[facility]    Progress Note    Shane Hawthorne Patient Status:  Inpatient    7/10/1970 MRN [id number]   Location [facility] 2W/SW Attending Gina Garcia MD   Hosp Day # 3 PCP Jaycob Aleman MD     Subjective:    POD 4 lap VHR, POD 3 diagnostic lap, hematoma evacuation. Hgb stable, pain improved.    Objective:    Vital Signs:  Blood pressure 157/82, pulse 93, temperature 98.8 °F (37.1 °C), temperature source Temporal, resp. rate 15, height 5' 9\" (1.753 m), weight 213 lb 13.5 oz (97 kg), SpO2 95%.     Gen: awake, alert  HEENT: traumatic, normocephalic  Pulm: no increased WOB, symmetric chest rise  CV: regular rate and rhythm   Abdo: soft, ND, minimally TTP, incisions c/d/I, drain SS  Skin: no obvious rashes, lesions  Psych: normal mood, affect  Neuro: no focal deficits, alert & oriented      Results:    Lab Results   Component Value Date    WBC 7.0 2024    HGB 8.0 (L) 2024    HCT 24.6 (L) 2024    .0 (L) 2024    CREATSERUM 6.11 (H) 2024    BUN 29 (H) 2024     2024    K 3.8 2024     2024    CO2 26.0 2024     (H) 2024    CA 8.3 (L) 2024    ALB 3.4 2024    ALKPHO 69 01/10/2024    BILT 0.5 01/10/2024    TP 7.5 01/10/2024    AST 19 01/10/2024    ALT 16 01/10/2024    PTT 31.4 2024    INR 1.06 2024    TSH 2.960 2023     (H) 10/16/2023    PSA 0.11 2023    MG 1.7 2024    PHOS 4.9 2024    TROP <0.045 2021    ETOH <3 2021       No results found.        Assessment & Plan:      53M POD 4 lap VHR, POD 3 diagnostic lap, hematoma evacuation.    - ok to advance diet  - can down grade to floor  - can discontinue serial hgb checks. Would recheck hgb if VS change or concern for bleeding     Discussed with Dr. Holly Higginbotham PGY4  General Surgery

## 2024-01-21 NOTE — PLAN OF CARE
Pt remains alert and oriented overnight. 2LNC. 1 unit PRBCs given. PRN metoprolol given for tachycardia, see MAR. PRN pain medication given, see mar. Pt and family updated on plan of care and all questions answered at this time.     Problem: Diabetes/Glucose Control  Goal: Glucose maintained within prescribed range  Description: INTERVENTIONS:  - Monitor Blood Glucose as ordered  - Assess for signs and symptoms of hyperglycemia and hypoglycemia  - Administer ordered medications to maintain glucose within target range  - Assess barriers to adequate nutritional intake and initiate nutrition consult as needed  - Instruct patient on self management of diabetes  Outcome: Progressing     Problem: RESPIRATORY - ADULT  Goal: Achieves optimal ventilation and oxygenation  Description: INTERVENTIONS:  - Assess for changes in respiratory status  - Assess for changes in mentation and behavior  - Position to facilitate oxygenation and minimize respiratory effort  - Oxygen supplementation based on oxygen saturation or ABGs  - Provide Smoking Cessation handout, if applicable  - Encourage broncho-pulmonary hygiene including cough, deep breathe, Incentive Spirometry  - Assess the need for suctioning and perform as needed  - Assess and instruct to report SOB or any respiratory difficulty  - Respiratory Therapy support as indicated  - Manage/alleviate anxiety  - Monitor for signs/symptoms of CO2 retention  Outcome: Progressing     Problem: SKIN/TISSUE INTEGRITY - ADULT  Goal: Skin integrity remains intact  Description: INTERVENTIONS  - Assess and document risk factors for pressure ulcer development  - Assess and document skin integrity  - Monitor for areas of redness and/or skin breakdown  - Initiate interventions, skin care algorithm/standards of care as needed  Outcome: Progressing  Goal: Incision(s), wounds(s) or drain site(s) healing without S/S of infection  Description: INTERVENTIONS:  - Assess and document risk factors for  pressure ulcer development  - Assess and document skin integrity  - Assess and document dressing/incision, wound bed, drain sites and surrounding tissue  - Implement wound care per orders  - Initiate isolation precautions as appropriate  - Initiate Pressure Ulcer prevention bundle as indicated  Outcome: Progressing     Problem: PAIN - ADULT  Goal: Verbalizes/displays adequate comfort level or patient's stated pain goal  Description: INTERVENTIONS:  - Encourage pt to monitor pain and request assistance  - Assess pain using appropriate pain scale  - Administer analgesics based on type and severity of pain and evaluate response  - Implement non-pharmacological measures as appropriate and evaluate response  - Consider cultural and social influences on pain and pain management  - Manage/alleviate anxiety  - Utilize distraction and/or relaxation techniques  - Monitor for opioid side effects  - Notify MD/LIP if interventions unsuccessful or patient reports new pain  - Anticipate increased pain with activity and pre-medicate as appropriate  Outcome: Not Progressing     Problem: HEMATOLOGIC - ADULT  Goal: Maintains hematologic stability  Description: INTERVENTIONS  - Assess for signs and symptoms of bleeding or hemorrhage  - Monitor labs and vital signs for trends  - Administer supportive blood products/factors, fluids and medications as ordered and appropriate  - Administer supportive blood products/factors as ordered and appropriate  Outcome: Not Progressing  Goal: Free from bleeding injury  Description: (Example usage: patient with low platelets)  INTERVENTIONS:  - Avoid intramuscular injections, enemas and rectal medication administration  - Ensure safe mobilization of patient  - Hold pressure on venipuncture sites to achieve adequate hemostasis  - Assess for signs and symptoms of internal bleeding  - Monitor lab trends  - Patient is to report abnormal signs of bleeding to staff  - Avoid use of toothpicks and dental  floss  - Use electric shaver for shaving  - Use soft bristle tooth brush  - Limit straining and forceful nose blowing  Outcome: Not Progressing

## 2024-01-21 NOTE — PLAN OF CARE
Problem: Patient Centered Care  Goal: Patient preferences are identified and integrated in the patient's plan of care  Description: Interventions:  - What would you like us to know as we care for you? Everyone knock prior to going into the room  - Provide timely, complete, and accurate information to patient/family  - Incorporate patient and family knowledge, values, beliefs, and cultural backgrounds into the planning and delivery of care  - Encourage patient/family to participate in care and decision-making at the level they choose  - Honor patient and family perspectives and choices  Outcome: Progressing     Problem: Diabetes/Glucose Control  Goal: Glucose maintained within prescribed range  Description: INTERVENTIONS:  - Monitor Blood Glucose as ordered  - Assess for signs and symptoms of hyperglycemia and hypoglycemia  - Administer ordered medications to maintain glucose within target range  - Assess barriers to adequate nutritional intake and initiate nutrition consult as needed  - Instruct patient on self management of diabetes  Outcome: Progressing     Problem: Patient/Family Goals  Goal: Patient/Family Long Term Goal  Description: Patient's Long Term Goal: HGB within normal parameters for male     Interventions:  - assess mart output  Trend h and h  Administer PRBC as per orders    - See additional Care Plan goals for specific interventions  Outcome: Progressing  Goal: Patient/Family Short Term Goal  Description: Patient's Short Term Goal: pain 0 out of 10 one hour post medication    Interventions:   - assess pain q 1 hour   Administer pain medication as per orders    - See additional Care Plan goals for specific interventions  Outcome: Progressing     Problem: PAIN - ADULT  Goal: Verbalizes/displays adequate comfort level or patient's stated pain goal  Description: INTERVENTIONS:  - Encourage pt to monitor pain and request assistance  - Assess pain using appropriate pain scale  - Administer analgesics based  on type and severity of pain and evaluate response  - Implement non-pharmacological measures as appropriate and evaluate response  - Consider cultural and social influences on pain and pain management  - Manage/alleviate anxiety  - Utilize distraction and/or relaxation techniques  - Monitor for opioid side effects  - Notify MD/LIP if interventions unsuccessful or patient reports new pain  - Anticipate increased pain with activity and pre-medicate as appropriate  Outcome: Progressing     Problem: RESPIRATORY - ADULT  Goal: Achieves optimal ventilation and oxygenation  Description: INTERVENTIONS:  - Assess for changes in respiratory status  - Assess for changes in mentation and behavior  - Position to facilitate oxygenation and minimize respiratory effort  - Oxygen supplementation based on oxygen saturation or ABGs  - Provide Smoking Cessation handout, if applicable  - Encourage broncho-pulmonary hygiene including cough, deep breathe, Incentive Spirometry  - Assess the need for suctioning and perform as needed  - Assess and instruct to report SOB or any respiratory difficulty  - Respiratory Therapy support as indicated  - Manage/alleviate anxiety  - Monitor for signs/symptoms of CO2 retention  Outcome: Progressing     Problem: SKIN/TISSUE INTEGRITY - ADULT  Goal: Skin integrity remains intact  Description: INTERVENTIONS  - Assess and document risk factors for pressure ulcer development  - Assess and document skin integrity  - Monitor for areas of redness and/or skin breakdown  - Initiate interventions, skin care algorithm/standards of care as needed  Outcome: Progressing  Goal: Incision(s), wounds(s) or drain site(s) healing without S/S of infection  Description: INTERVENTIONS:  - Assess and document risk factors for pressure ulcer development  - Assess and document skin integrity  - Assess and document dressing/incision, wound bed, drain sites and surrounding tissue  - Implement wound care per orders  - Initiate  isolation precautions as appropriate  - Initiate Pressure Ulcer prevention bundle as indicated  Outcome: Progressing     Problem: HEMATOLOGIC - ADULT  Goal: Maintains hematologic stability  Description: INTERVENTIONS  - Assess for signs and symptoms of bleeding or hemorrhage  - Monitor labs and vital signs for trends  - Administer supportive blood products/factors, fluids and medications as ordered and appropriate  - Administer supportive blood products/factors as ordered and appropriate  Outcome: Progressing  Goal: Free from bleeding injury  Description: (Example usage: patient with low platelets)  INTERVENTIONS:  - Avoid intramuscular injections, enemas and rectal medication administration  - Ensure safe mobilization of patient  - Hold pressure on venipuncture sites to achieve adequate hemostasis  - Assess for signs and symptoms of internal bleeding  - Monitor lab trends  - Patient is to report abnormal signs of bleeding to staff  - Avoid use of toothpicks and dental floss  - Use electric shaver for shaving  - Use soft bristle tooth brush  - Limit straining and forceful nose blowing  Outcome: Progressing

## 2024-01-21 NOTE — PHYSICAL THERAPY NOTE
PHYSICAL THERAPY EVALUATION - INPATIENT     Room Number: 223/223-A  Evaluation Date: 1/21/2024  Type of Evaluation: Initial   Physician Order: PT Eval and Treat    Presenting Problem: POD 4 lap VHR, POD 3 diagnostic lap, hematoma evacuation. Hgb stable, pain improved.  Co-Morbidities : End-stage renal disease, on hemodialysis.  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  Reason for Therapy: Mobility Dysfunction and Discharge Planning    PHYSICAL THERAPY ASSESSMENT     Patient is a 53 year old male admitted 1/18/2024 for POD 4 lap VHR, POD 3 diagnostic lap, hematoma evacuation.  Patient's current functional deficits include, impaired ambulation tolerance, increased level of physical assistance for performance of ADL's and functional mobility, which are below the patient's pre-admission status.      The patient's Approx Degree of Impairment: 20.91% has been calculated based on documentation in the [facility] '6 clicks' Inpatient Basic Mobility Short Form.  Research supports that patients with this level of impairment may benefit from Home with no services.    Patient will benefit from continued IP PT services to address these deficits in preparation for discharge.    DISCHARGE RECOMMENDATIONS  PT Discharge Recommendations: Intermittent Supervision;Home    PLAN  PT Treatment Plan: Bed mobility;Body mechanics;Energy conservation;Gait training;Neuromuscular re-educate;Strengthening;Stair training;Transfer training  Rehab Potential : Good  Frequency (Obs): 3-5x/week       PHYSICAL THERAPY MEDICAL/SOCIAL HISTORY     History related to current admission: 53M POD 4 lap VHR, POD 3 diagnostic lap, hematoma evacuation.      Problem List  Active Problems:    Abdominal pain     Post-operative pain    ESRD on hemodialysis (HCC)      HOME SITUATION  Home Situation  Type of Home: House  Home Layout: Two level  Stairs to Enter : 5  Railing: Yes  Stairs to Bedroom: 0 (FOS to basement)  Railing: Yes  Lives With: Spouse and adult children  Drives: Yes  Patient Owned Equipment: None  Patient Regularly Uses: Glasses     Prior Level of [place]: Prior to admission patient was independent with performance of all ADL's and performance of functional mobility with no assistive device.    SUBJECTIVE  \"It's not a good idea to walk with the catheter\"    PHYSICAL THERAPY EXAMINATION     OBJECTIVE  Precautions: Abdominal protective strategies;Limb alert - left  Fall Risk: Standard fall risk    WEIGHT BEARING RESTRICTION  Weight Bearing Restriction: None    PAIN ASSESSMENT  Rating: Unable to rate  Location: abdomen  Management Techniques: Activity promotion;Body mechanics;Repositioning;Breathing techniques;Relaxation    COGNITION  Overall Cognitive Status:  WFL - within functional limits  Arousal/Alertness:  appropriate responses to stimuli  Orientation Level:  oriented x4  Safety Judgement:  decreased awareness of need for safety    RANGE OF MOTION AND STRENGTH ASSESSMENT  Upper extremity ROM and strength are within functional limits BUE.  Lower extremity ROM is within functional limits BLE.  Lower extremity strength is within functional limits grossly 4/5 throughout.    BALANCE  Static Sitting: Good  Dynamic Sitting: Fair  Static Standing: Good  Dynamic Standing: Fair    ACTIVITY TOLERANCE  Pulse: 116  Heart Rate Source: Monitor     BP: (!) 165/84 (supine at start, 172/87 seated edge of bed, 163/84 seated in recliner chair at end of session)  BP Location: Right arm  BP Method: Automatic  Patient Position: Sitting    O2 WALK  Oxygen Therapy  SPO2% on Room Air at Rest: 98  SPO2% Ambulation on Room Air: 98    AM-PAC '6-Clicks' INPATIENT SHORT FORM - BASIC MOBILITY  How much difficulty does the patient  currently have...  Patient Difficulty: Turning over in bed (including adjusting bedclothes, sheets and blankets)?: None   Patient Difficulty: Sitting down on and standing up from a chair with arms (e.g., wheelchair, bedside commode, etc.): None   Patient Difficulty: Moving from lying on back to sitting on the side of the bed?: A Little   How much help from another person does the patient currently need...   Help from Another: Moving to and from a bed to a chair (including a wheelchair)?: None   Help from Another: Need to walk in hospital room?: None   Help from Another: Climbing 3-5 steps with a railing?: A Little     AM-PAC Score:  Raw Score: 22   Approx Degree of Impairment: 20.91%   Standardized Score (AM-PAC Scale): 53.28   CMS Modifier (G-Code):     FUNCTIONAL ABILITY STATUS  Functional Mobility/Gait Assessment  Gait Assistance: Supervision  Distance (ft): >150 feet  Assistive Device: Rolling walker  Pattern:  (decreased garertt, decreased step length, forward flexed posture, narrow base of support, verbal cues for sequencing and rolling walker management. Anticipate patient will be able to ambulate well at follow up session with no right ear.)  Stairs:  (Deferred this session due to limited activity tolerance.)    Bed Mobility: Patient performs supine to sit transfer with supervision.    Transfers: Patient performs sit to stand and bed to chair transfers with supervision.    Exercise/Education Provided:  Bed mobility  Body mechanics  Energy conservation  Functional activity tolerated  Gait training  Posture  Transfer training    Patient End of Session: Up in chair;Needs met;Call light within reach;RN aware of session/findings;Family present    CURRENT GOALS    Goals to be met by: 1/28/24  Patient Goal Patient's self-stated goal is: to return home   Goal #1 Patient is able to demonstrate supine - sit EOB @ level: independent     Goal #1   Current Status    Goal #2 Patient is able to demonstrate transfers Sit  to/from Stand at assistance level: independent with none     Goal #2  Current Status    Goal #3 Patient is able to ambulate >300 feet with assist device: none at assistance level: independent   Goal #3   Current Status    Goal #4 Patient will negotiate 5 stairs/one curb w/ assistive device and supervision   Goal #4   Current Status    Goal #5 Patient to demonstrate independence with home activity/exercise instructions provided to patient in preparation for discharge.   Goal #5   Current Status    Goal #6    Goal #6  Current Status      Patient Evaluation Complexity Level:   History High - 3 or more personal factors and/or co-morbidities   Examination of body systems Moderate - addressing a total of 3 or more elements   Clinical Presentation Moderate - Evolving   Clinical Decision Making Moderate Complexity     Gait Training: 10 minutes     Jessi Ward PT, DPT

## 2024-01-21 NOTE — PLAN OF CARE
Problem: Patient Centered Care  Goal: Patient preferences are identified and integrated in the patient's plan of care  Description: Interventions:  - What would you like us to know as we care for you? I am going to get a kidney transplant I hope soon,   - Provide timely, complete, and accurate information to patient/family  - Incorporate patient and family knowledge, values, beliefs, and cultural backgrounds into the planning and delivery of care  - Encourage patient/family to participate in care and decision-making at the level they choose  - Honor patient and family perspectives and choices  1/21/2024 1513 by Mona Larsen RN  Outcome: Progressing  1/21/2024 0917 by Mona Larsen RN  Outcome: Progressing     Problem: Diabetes/Glucose Control  Goal: Glucose maintained within prescribed range  Description: INTERVENTIONS:  - Monitor Blood Glucose as ordered  - Assess for signs and symptoms of hyperglycemia and hypoglycemia  - Administer ordered medications to maintain glucose within target range  - Assess barriers to adequate nutritional intake and initiate nutrition consult as needed  - Instruct patient on self management of diabetes  1/21/2024 1513 by Mona Larsen RN  Outcome: Progressing  1/21/2024 0917 by Mona Larsen RN  Outcome: Progressing     Problem: Patient/Family Goals  Goal: Patient/Family Long Term Goal  Description: Patient's Long Term Goal: hgb to trend higher by one to two points post interventions. HGB level not to drop to 7    Interventions:  - serial hgb levels  - report abnormal asssessments  - See additional Care Plan goals for specific interventions  1/21/2024 1513 by Mona Larsen, RN  Outcome: Progressing  1/21/2024 0917 by Mona Larsen RN  Outcome: Progressing  Goal: Patient/Family Short Term Goal  Description: Patient's Short Term Goal: pain 0 after intervention within 15 minutes    Interventions:   - assess pain q 4 hours and prn  - administer  pain medications as per dr orders  - See additional Care Plan goals for specific interventions  1/21/2024 1513 by Mona Larsen RN  Outcome: Progressing  1/21/2024 0917 by Mona Larsen RN  Outcome: Progressing     Problem: PAIN - ADULT  Goal: Verbalizes/displays adequate comfort level or patient's stated pain goal  Description: INTERVENTIONS:  - Encourage pt to monitor pain and request assistance  - Assess pain using appropriate pain scale  - Administer analgesics based on type and severity of pain and evaluate response  - Implement non-pharmacological measures as appropriate and evaluate response  - Consider cultural and social influences on pain and pain management  - Manage/alleviate anxiety  - Utilize distraction and/or relaxation techniques  - Monitor for opioid side effects  - Notify MD/LIP if interventions unsuccessful or patient reports new pain  - Anticipate increased pain with activity and pre-medicate as appropriate  1/21/2024 1513 by Mona Larsen RN  Outcome: Progressing  1/21/2024 0917 by Mona Larsen RN  Outcome: Progressing     Problem: RESPIRATORY - ADULT  Goal: Achieves optimal ventilation and oxygenation  Description: INTERVENTIONS:  - Assess for changes in respiratory status  - Assess for changes in mentation and behavior  - Position to facilitate oxygenation and minimize respiratory effort  - Oxygen supplementation based on oxygen saturation or ABGs  - Provide Smoking Cessation handout, if applicable  - Encourage broncho-pulmonary hygiene including cough, deep breathe, Incentive Spirometry  - Assess the need for suctioning and perform as needed  - Assess and instruct to report SOB or any respiratory difficulty  - Respiratory Therapy support as indicated  - Manage/alleviate anxiety  - Monitor for signs/symptoms of CO2 retention  1/21/2024 1513 by Mona Larsen RN  Outcome: Progressing  1/21/2024 0917 by Mona Larsen RN  Outcome: Progressing      Problem: SKIN/TISSUE INTEGRITY - ADULT  Goal: Skin integrity remains intact  Description: INTERVENTIONS  - Assess and document risk factors for pressure ulcer development  - Assess and document skin integrity  - Monitor for areas of redness and/or skin breakdown  - Initiate interventions, skin care algorithm/standards of care as needed  1/21/2024 1513 by Mona Larsen RN  Outcome: Progressing  1/21/2024 0917 by Mona Larsen RN  Outcome: Progressing  Goal: Incision(s), wounds(s) or drain site(s) healing without S/S of infection  Description: INTERVENTIONS:  - Assess and document risk factors for pressure ulcer development  - Assess and document skin integrity  - Assess and document dressing/incision, wound bed, drain sites and surrounding tissue  - Implement wound care per orders  - Initiate isolation precautions as appropriate  - Initiate Pressure Ulcer prevention bundle as indicated  1/21/2024 1513 by Mona Larsen RN  Outcome: Progressing  1/21/2024 0917 by Mona Larsen RN  Outcome: Progressing     Problem: HEMATOLOGIC - ADULT  Goal: Maintains hematologic stability  Description: INTERVENTIONS  - Assess for signs and symptoms of bleeding or hemorrhage  - Monitor labs and vital signs for trends  - Administer supportive blood products/factors, fluids and medications as ordered and appropriate  - Administer supportive blood products/factors as ordered and appropriate  1/21/2024 1513 by Mona Larsen RN  Outcome: Progressing  1/21/2024 0917 by Mona Larsen RN  Outcome: Progressing  Goal: Free from bleeding injury  Description: (Example usage: patient with low platelets)  INTERVENTIONS:  - Avoid intramuscular injections, enemas and rectal medication administration  - Ensure safe mobilization of patient  - Hold pressure on venipuncture sites to achieve adequate hemostasis  - Assess for signs and symptoms of internal bleeding  - Monitor lab trends  - Patient is to report  abnormal signs of bleeding to staff  - Avoid use of toothpicks and dental floss  - Use electric shaver for shaving  - Use soft bristle tooth brush  - Limit straining and forceful nose blowing  1/21/2024 1513 by Mona Larsen, RN  Outcome: Progressing  1/21/2024 0917 by Mona Larsen, RN  Outcome: Progressing  Goal: Free from bleeding injury  Description: (Example usage: patient with low platelets)  INTERVENTIONS:  - Avoid intramuscular injections, enemas and rectal medication administration  - Ensure safe mobilization of patient  - Hold pressure on venipuncture sites to achieve adequate hemostasis  - Assess for signs and symptoms of internal bleeding  - Monitor lab trends  - Patient is to report abnormal signs of bleeding to staff  - Avoid use of toothpicks and dental floss  - Use electric shaver for shaving  - Use soft bristle tooth brush  - Limit straining and forceful nose blowing  Outcome: Progressing

## 2024-01-22 PROBLEM — D50.0 BLOOD LOSS ANEMIA: Status: ACTIVE | Noted: 2024-01-22

## 2024-01-22 PROBLEM — E83.39 HYPERPHOSPHATEMIA: Status: ACTIVE | Noted: 2024-01-22

## 2024-01-22 LAB
ALBUMIN SERPL-MCNC: 3.5 G/DL (ref 3.2–4.8)
ANION GAP SERPL CALC-SCNC: 8 MMOL/L (ref 0–18)
BLOOD TYPE BARCODE: 5100
BUN BLD-MCNC: 40 MG/DL (ref 9–23)
BUN/CREAT SERPL: 5.8 (ref 10–20)
CALCIUM BLD-MCNC: 8.5 MG/DL (ref 8.7–10.4)
CHLORIDE SERPL-SCNC: 103 MMOL/L (ref 98–112)
CO2 SERPL-SCNC: 26 MMOL/L (ref 21–32)
CREAT BLD-MCNC: 6.87 MG/DL
DEPRECATED RDW RBC AUTO: 53.1 FL (ref 35.1–46.3)
EGFRCR SERPLBLD CKD-EPI 2021: 9 ML/MIN/1.73M2 (ref 60–?)
ERYTHROCYTE [DISTWIDTH] IN BLOOD BY AUTOMATED COUNT: 16.4 % (ref 11–15)
GLUCOSE BLD-MCNC: 149 MG/DL (ref 70–99)
GLUCOSE BLDC GLUCOMTR-MCNC: 113 MG/DL (ref 70–99)
GLUCOSE BLDC GLUCOMTR-MCNC: 136 MG/DL (ref 70–99)
GLUCOSE BLDC GLUCOMTR-MCNC: 164 MG/DL (ref 70–99)
GLUCOSE BLDC GLUCOMTR-MCNC: 191 MG/DL (ref 70–99)
GLUCOSE BLDC GLUCOMTR-MCNC: 194 MG/DL (ref 70–99)
HCT VFR BLD AUTO: 24.9 %
HGB BLD-MCNC: 8.4 G/DL
MCH RBC QN AUTO: 30.5 PG (ref 26–34)
MCHC RBC AUTO-ENTMCNC: 33.7 G/DL (ref 31–37)
MCV RBC AUTO: 90.5 FL
OSMOLALITY SERPL CALC.SUM OF ELEC: 297 MOSM/KG (ref 275–295)
PHOSPHATE SERPL-MCNC: 5.3 MG/DL (ref 2.4–5.1)
PLATELET # BLD AUTO: 113 10(3)UL (ref 150–450)
POTASSIUM SERPL-SCNC: 3.9 MMOL/L (ref 3.5–5.1)
RBC # BLD AUTO: 2.75 X10(6)UL
SODIUM SERPL-SCNC: 137 MMOL/L (ref 136–145)
UNIT VOLUME: 350 ML
WBC # BLD AUTO: 6.4 X10(3) UL (ref 4–11)

## 2024-01-22 PROCEDURE — 99233 SBSQ HOSP IP/OBS HIGH 50: CPT | Performed by: HOSPITALIST

## 2024-01-22 PROCEDURE — 99233 SBSQ HOSP IP/OBS HIGH 50: CPT | Performed by: INTERNAL MEDICINE

## 2024-01-22 RX ORDER — CARVEDILOL 6.25 MG/1
12.5 TABLET ORAL 2 TIMES DAILY WITH MEALS
Status: DISCONTINUED | OUTPATIENT
Start: 2024-01-22 | End: 2024-01-23

## 2024-01-22 RX ORDER — ATORVASTATIN CALCIUM 40 MG/1
40 TABLET, FILM COATED ORAL NIGHTLY
Qty: 90 TABLET | Refills: 3 | Status: SHIPPED | OUTPATIENT
Start: 2024-01-22

## 2024-01-22 RX ORDER — CIPROFLOXACIN HYDROCHLORIDE 3.5 MG/ML
1 SOLUTION/ DROPS TOPICAL
Status: DISCONTINUED | OUTPATIENT
Start: 2024-01-22 | End: 2024-01-23

## 2024-01-22 NOTE — TELEPHONE ENCOUNTER
Patient called to request a re-fill on below medication. Pharmacy in chart verified.     Medication Ordered Dose Frequency Start End   atorvastatin (Lipitor) tab 40 mg 40 mg Nightly 1/19/2024 2100 (none)

## 2024-01-22 NOTE — PROGRESS NOTES
[facility]    Progress Note    Shane Hawthorne Patient Status:  Inpatient    7/10/1970 MRN [id number]   Location [facility] 2W/SW Attending Gina Garcia MD   Hosp Day # 4 PCP Jaycob Aleman MD       Subjective:   POD4 s/p VHR and diagnostic lap with hematoma evacuation. Hemoglobin stable, pain controlled. Dialysis MWF.    Objective:   Vital Signs:  Blood pressure 132/85, pulse 110, temperature 98.8 °F (37.1 °C), temperature source Temporal, resp. rate 16, height 5' 9\" (1.753 m), weight 213 lb 13.5 oz (97 kg), SpO2 90%.    Physical Exam     General: No acute distress. Alert and oriented x 3.  HEENT: Moist mucous membranes. Anicteric.   Neck: Supple, No JVD.   Respiratory: Nonlabored resp.  Cardiovascular: Regular rate.   Abdomen: Soft, expected incisional tenderness, no rebound tnd, no guarding, nondistended.  No masses. Normal bowel sounds. Incision c/d/I, MARYELLEN bloody.  Neurologic: No focal neurological deficits.  Musculoskeletal: Full range of motion of all extremities. No calf tenderness. No swelling noted.  Integument: No lesions. No erythema.  Psychiatric: Appropriate mood and affect.         Assessment and Plan:     Abdominal pain      Post-operative pain      ESRD on hemodialysis (HCC)    Diet as tolerated. Hgb stable, continue MARYELLEN drain until output decreases. Dialysis today. Will continue to follow and monitor progress.    Results:     Lab Results   Component Value Date    WBC 6.4 2024    HGB 8.4 (L) 2024    HCT 24.9 (L) 2024    .0 (L) 2024    CREATSERUM 6.87 (H) 2024    BUN 40 (H) 2024     2024    K 3.9 2024     2024    CO2 26.0 2024     (H) 2024    CA 8.5 (L) 2024    ALB 3.5 2024    ALKPHO 69 01/10/2024    BILT 0.5 01/10/2024    TP 7.5 01/10/2024    AST 19 01/10/2024    ALT 16 01/10/2024    PTT 31.4 2024    INR 1.06 2024    TSH 2.960 2023     (H)  10/16/2023    PSA 0.11 07/13/2023    MG 1.7 01/20/2024    PHOS 5.3 (H) 01/22/2024    TROP <0.045 05/20/2021    ETOH <3 05/20/2021       No results found.                Riaz Jackson PA-C  1/22/2024

## 2024-01-22 NOTE — PROGRESS NOTES
INFECTIOUS DISEASE PROGRESS NOTE    Shane Hawthorne Patient Status:  Inpatient    7/10/1970 MRN [id number]   Location [facility] 2W/SW Attending Gina Garcia MD   Hosp Day # 4 PCP Jaycob Aleman MD     SUBJECTIVE  Patient seen and examined, chart, notes, labs reviewed  Sleeping  No fever  Wbc 6.4    Antibiotics: ancef x 1 dose pre-op     ASSESSMENT:     # S/p ventral hernia repair  c/b intra-abd bleeding now s/p hematoma evacuation   # leukocytosis likely reactive, resolved  # hypotensive shock in the setting of acute bleed, resolved  # ESRD on HD     PLAN:     -  continue to monitor off abx.    -  surgery following  -  Follow fever curve, wbc  -  Reviewed labs, micro, imaging reports, available old records  -  d/w staff  -  will follow as needed    OBJECTIVE  /85 (BP Location: Right arm)   Pulse 107   Temp 98 °F (36.7 °C) (Temporal)   Resp 18   Ht 175.3 cm (5' 9\")   Wt 213 lb 13.5 oz (97 kg)   SpO2 95%   BMI 31.58 kg/m²     General: No acute distress.  Sleeping  HEENT: EOMI   Abdomen: Abdominal binder in place, MARYELLEN drain  Musculoskeletal: No edema  Integument: No rashes  R chest permacath    Labs:     Recent Labs   Lab 24  2301 24  0316 24  1049 24  0311 24  0758 24  0345 24  0521 24  1315 24  2030 24  0500   WBC 10.8 13.8*  --  8.6  --   --  7.0  --   --  6.4   HGB 8.5* 9.0*   < > 6.6*   < > 8.1* 8.0* 8.7* 8.0* 8.4*   .0* 195.0  --  112.0*  --   --  106.0*  --   --  113.0*    < > = values in this interval not displayed.       Recent Labs   Lab 24  1934 24  0316 24  0311 24  0521 24  0500    140 137 137 137   K 5.3*  5.3* 6.0* 3.8 3.8 3.9    113* 102 103 103   CO2 22.0 21.0 28.0 26.0 26.0   BUN 26* 30* 23 29* 40*   CREATSERUM 5.20* 5.79* 4.86* 6.11* 6.87*       No results for input(s): \"ALT\", \"AST\" in the last 168 hours.    Invalid input(s): \"ALPHOS\",  \"TBIL\"    Microbiology: Reviewed    Imaging: Reviewed     [facility]  [phone]

## 2024-01-22 NOTE — PROGRESS NOTES
[facility]    Progress Note      Subjective:     Lying comfortably.  Hemoglobin stable.  No chest pain or shortness of breath      Review of Systems:   Constitutional: negative for fatigue, fevers and weight loss  Eyes: negative for irritation, redness and visual disturbance  Ears, nose, mouth, throat, and face: negative for hearing loss and sore throat  Respiratory: negative for cough, hemoptysis and wheezing  Cardiovascular: negative for chest pain, exertional dyspnea, lower extremity edema   Gastrointestinal: negative for abdominal pain, diarrhea and nausea  Hematologic/lymphatic: negative for bleeding and easy bruising  Musculoskeletal:negative for back pain, bone pain and muscle weakness  Neurological: negative for gait problems, memory problems and seizures  Behavioral/Psych: negative for anxiety and depression    Objective:   Temp:  [97.9 °F (36.6 °C)] 97.9 °F (36.6 °C)  Pulse:  [] 101  Resp:  [9-24] 18  BP: (132-180)/() 132/85  SpO2:  [86 %-97 %] 91 %  SpO2: 91 %     Intake/Output Summary (Last 24 hours) at 1/22/2024 1149  Last data filed at 1/22/2024 0800  Gross per 24 hour   Intake 250 ml   Output 30 ml   Net 220 ml     Wt Readings from Last 3 Encounters:   01/20/24 213 lb 13.5 oz (97 kg)   01/09/24 196 lb (88.9 kg)   12/20/23 196 lb (88.9 kg)       General appearance: alert, appears stated age and cooperative  Head: Normocephalic, atraumatic  Eyes: conjunctivae/corneas clear  Throat: lips, mucosa, and tongue normal; teeth and gums normal  Neck:  no JVD, supple  Extremities: extremities normal, no edema  Skin: No rashes or lesions  Neurologic: Grossly normal  Psychiatric: calm    Medications:  Current Facility-Administered Medications   Medication Dose Route Frequency    influenza vaccine split quad (Fluzone QIV) 0.5 mL IM injection (ages 6 months to 64 years) 0.5 mL  0.5 mL Intramuscular Prior to discharge    tamsulosin (Flomax) cap 0.4 mg  0.4 mg Oral Daily @ 0700     carvedilol (Coreg) tab 6.25 mg  6.25 mg Oral BID with meals    glycerin-hypromellose- (Artifical Tears) 0.2-0.2-1 % ophthalmic solution 1 drop  1 drop Both Eyes QID PRN    sodium chloride 0.9 % IV bolus 100 mL  100 mL Intravenous Q30 Min PRN    And    albumin human (Albumin) 25% injection 25 g  25 g Intravenous PRN Dialysis    insulin aspart (NovoLOG) 100 Units/mL FlexPen 1-5 Units  1-5 Units Subcutaneous q6h    metoprolol (Lopressor) 5 mg/5mL injection 5 mg  5 mg Intravenous Q6H PRN    heparin (Porcine) 1000 UNIT/ML injection 1,500 Units  1.5 mL Intravenous PRN Dialysis    benzonatate (Tessalon) cap 100 mg  100 mg Oral TID PRN    dextrose 50% injection 50 mL  50 mL Intravenous PRN    albuterol (Ventolin) (2.5 MG/3ML) 0.083% nebulizer solution 2.5 mg  2.5 mg Nebulization Q4H PRN    atorvastatin (Lipitor) tab 40 mg  40 mg Oral Nightly    midodrine (ProAmatine) tab 5 mg  5 mg Oral Q MWF    multivitamin (Dialyvite - Renal) tab 1 tablet  1 tablet Oral Daily    sevelamer carbonate (Renvela) tab 800 mg  800 mg Oral TID    acetaminophen (Tylenol) tab 650 mg  650 mg Oral Q4H PRN    Or    HYDROcodone-acetaminophen (Norco) 5-325 MG per tab 1 tablet  1 tablet Oral Q4H PRN    Or    HYDROcodone-acetaminophen (Norco) 5-325 MG per tab 2 tablet  2 tablet Oral Q4H PRN    HYDROmorphone (Dilaudid) 1 MG/ML injection 0.2 mg  0.2 mg Intravenous Q2H PRN    Or    HYDROmorphone (Dilaudid) 1 MG/ML injection 0.4 mg  0.4 mg Intravenous Q2H PRN    Or    HYDROmorphone (Dilaudid) 1 MG/ML injection 0.8 mg  0.8 mg Intravenous Q2H PRN    ondansetron (Zofran) 4 MG/2ML injection 4 mg  4 mg Intravenous Q6H PRN    prochlorperazine (Compazine) 10 MG/2ML injection 5 mg  5 mg Intravenous Q8H PRN    temazepam (Restoril) cap 15 mg  15 mg Oral Nightly PRN              Results:     Recent Labs   Lab 01/18/24  2301 01/19/24  0316 01/19/24  1049 01/20/24  0311 01/20/24  0758 01/20/24  1804 01/20/24  2304 01/21/24  0521 01/21/24  1315 01/21/24  2030  01/22/24  0500   RBC 2.86* 3.09*  --  2.17*  --   --   --  2.70*  --   --  2.75*   HGB 8.5* 9.0*   < > 6.6*   < > 7.7*   < > 8.0* 8.7* 8.0* 8.4*   HCT 26.8* 28.7*   < > 19.9*  --  24.0*  --  24.6*  --   --  24.9*   MCV 93.7 92.9  --  91.7  --   --   --  91.1  --   --  90.5   NEPRELIM 9.11* 11.48*  --  6.51  --   --   --   --   --   --   --    WBC 10.8 13.8*  --  8.6  --   --   --  7.0  --   --  6.4   .0* 195.0  --  112.0*  --   --   --  106.0*  --   --  113.0*    < > = values in this interval not displayed.     Recent Labs   Lab 01/20/24 0311 01/21/24  0521 01/22/24  0500   * 142* 149*   BUN 23 29* 40*   CREATSERUM 4.86* 6.11* 6.87*   CA 8.1* 8.3* 8.5*   ALB 3.2 3.4 3.5    137 137   K 3.8 3.8 3.9    103 103   CO2 28.0 26.0 26.0     PTT   Date Value Ref Range Status   01/20/2024 31.4 23.3 - 35.6 seconds Final     INR   Date Value Ref Range Status   01/20/2024 1.06 0.80 - 1.20 Final     Comment:     Only the INR (not the PT value) should be utilized for   the monitoring of oral anticoagulant therapy.     Recommended therapeutic ranges for anticoagulant therapy are   as follows:   2.0 - 3.0 All indications except for mechanical prosthetic   cardiac valves.     2.5 - 3.5 Mechanical prosthetic cardiac valves.       No results for input(s): \"BNP\" in the last 168 hours.  Recent Labs   Lab 01/18/24  1712 01/19/24  0316 01/20/24  0311 01/21/24  0521 01/22/24  0500   MG 1.9 1.8 1.7  --   --    PHOS  --   --  3.8 4.9 5.3*        Recent Labs   Lab 01/20/24  0311 01/21/24  0521 01/22/24  0500   PHOS 3.8 4.9 5.3*   ALB 3.2 3.4 3.5       No results found.        Assessment and Plan:       Patient is a 53-year-old male with past medical history of ESRD on HD, diabetes, hypertension who underwent laparoscopic repair of ventral hernia with mesh on June 18..  Course complicated by intra-abdominal hemorrhage and now s/p hematoma evacuation and multiple unit of blood product transfusion    1.ESRD on HD Monday  Wednesday Friday:  HD today for 3.5 hours  Serum Phos mildly elevated-low phos diet  Urinary retention s/p Díaz on 1/20.  On Flomax  Consider Díaz removal-bladder scan every 2 days    2.blood loss anemia:  Intra-abdominal hemorrhage post ventral hernia repair.  S/p FFP/packed red blood cells/DDAVP  Hemoglobin stable    3.hypertension: On Coreg  Blood pressure better controlled    Discussed with nursing        Romeo Sherwood MD  1/22/2024

## 2024-01-22 NOTE — PLAN OF CARE
Problem: Patient Centered Care  Goal: Patient preferences are identified and integrated in the patient's plan of care  Description: Interventions:  - What would you like us to know as we care for you?   - Provide timely, complete, and accurate information to patient/family  - Incorporate patient and family knowledge, values, beliefs, and cultural backgrounds into the planning and delivery of care  - Encourage patient/family to participate in care and decision-making at the level they choose  - Honor patient and family perspectives and choices  Outcome: Progressing     Problem: Diabetes/Glucose Control  Goal: Glucose maintained within prescribed range  Description: INTERVENTIONS:  - Monitor Blood Glucose as ordered  - Assess for signs and symptoms of hyperglycemia and hypoglycemia  - Administer ordered medications to maintain glucose within target range  - Assess barriers to adequate nutritional intake and initiate nutrition consult as needed  - Instruct patient on self management of diabetes  Outcome: Progressing     Problem: Patient/Family Goals  Goal: Patient/Family Long Term Goal  Description: Patient's Long Term Goal:     Interventions:  -   - See additional Care Plan goals for specific interventions  Outcome: Progressing  Goal: Patient/Family Short Term Goal  Description: Patient's Short Term Goal:     Interventions:   -   - See additional Care Plan goals for specific interventions  Outcome: Progressing     Problem: PAIN - ADULT  Goal: Verbalizes/displays adequate comfort level or patient's stated pain goal  Description: INTERVENTIONS:  - Encourage pt to monitor pain and request assistance  - Assess pain using appropriate pain scale  - Administer analgesics based on type and severity of pain and evaluate response  - Implement non-pharmacological measures as appropriate and evaluate response  - Consider cultural and social influences on pain and pain management  - Manage/alleviate anxiety  - Utilize  distraction and/or relaxation techniques  - Monitor for opioid side effects  - Notify MD/LIP if interventions unsuccessful or patient reports new pain  - Anticipate increased pain with activity and pre-medicate as appropriate  Outcome: Progressing     Problem: RESPIRATORY - ADULT  Goal: Achieves optimal ventilation and oxygenation  Description: INTERVENTIONS:  - Assess for changes in respiratory status  - Assess for changes in mentation and behavior  - Position to facilitate oxygenation and minimize respiratory effort  - Oxygen supplementation based on oxygen saturation or ABGs  - Provide Smoking Cessation handout, if applicable  - Encourage broncho-pulmonary hygiene including cough, deep breathe, Incentive Spirometry  - Assess the need for suctioning and perform as needed  - Assess and instruct to report SOB or any respiratory difficulty  - Respiratory Therapy support as indicated  - Manage/alleviate anxiety  - Monitor for signs/symptoms of CO2 retention  Outcome: Progressing     Problem: SKIN/TISSUE INTEGRITY - ADULT  Goal: Skin integrity remains intact  Description: INTERVENTIONS  - Assess and document risk factors for pressure ulcer development  - Assess and document skin integrity  - Monitor for areas of redness and/or skin breakdown  - Initiate interventions, skin care algorithm/standards of care as needed  Outcome: Progressing  Goal: Incision(s), wounds(s) or drain site(s) healing without S/S of infection  Description: INTERVENTIONS:  - Assess and document risk factors for pressure ulcer development  - Assess and document skin integrity  - Assess and document dressing/incision, wound bed, drain sites and surrounding tissue  - Implement wound care per orders  - Initiate isolation precautions as appropriate  - Initiate Pressure Ulcer prevention bundle as indicated  Outcome: Progressing     Problem: HEMATOLOGIC - ADULT  Goal: Maintains hematologic stability  Description: INTERVENTIONS  - Assess for signs and  symptoms of bleeding or hemorrhage  - Monitor labs and vital signs for trends  - Administer supportive blood products/factors, fluids and medications as ordered and appropriate  - Administer supportive blood products/factors as ordered and appropriate  Outcome: Progressing  Goal: Free from bleeding injury  Description: (Example usage: patient with low platelets)  INTERVENTIONS:  - Avoid intramuscular injections, enemas and rectal medication administration  - Ensure safe mobilization of patient  - Hold pressure on venipuncture sites to achieve adequate hemostasis  - Assess for signs and symptoms of internal bleeding  - Monitor lab trends  - Patient is to report abnormal signs of bleeding to staff  - Avoid use of toothpicks and dental floss  - Use electric shaver for shaving  - Use soft bristle tooth brush  - Limit straining and forceful nose blowing  Outcome: Progressing  Goal: Free from bleeding injury  Description: (Example usage: patient with low platelets)  INTERVENTIONS:  - Avoid intramuscular injections, enemas and rectal medication administration  - Ensure safe mobilization of patient  - Hold pressure on venipuncture sites to achieve adequate hemostasis  - Assess for signs and symptoms of internal bleeding  - Monitor lab trends  - Patient is to report abnormal signs of bleeding to staff  - Avoid use of toothpicks and dental floss  - Use electric shaver for shaving  - Use soft bristle tooth brush  - Limit straining and forceful nose blowing  Outcome: Progressing

## 2024-01-22 NOTE — PROGRESS NOTES
[facility]    Progress Note    Shane Hawthorne Patient Status:  Inpatient    7/10/1970 MRN [id number]   Location [facility] 2W/SW Attending Gina Garcia MD   Hosp Day # 3 PCP Jaycob Aleman MD     Chief complaint: shock  Subjective:     Not feeling well, but better than yesterday  Doesn't like luis, asking to be removed  Bloody output from MARYELLEN slowing down    Wife at bedside         Objective:   Blood pressure 158/82, pulse 107, temperature 98.8 °F (37.1 °C), temperature source Temporal, resp. rate (!) 9, height 5' 9\" (1.753 m), weight 213 lb 13.5 oz (97 kg), SpO2 97%.    GEN: acutely and chronically ill, more interactive, fully awake  HEENT: conjunctivae/corneas clear. PERRL, EOM's intact.  Neck: no adenopathy, no carotid bruit, no JVD, supple  Pulmonary:  clear to auscultation bilaterally  Cardiovascular: S1, S2 normal, no murmur, click, rub or gallop, regular rate and rhythm  Abdominal: soft, tender; bowel sounds normal;    Extremities: no cyanosis or edema  Pulses: palpable and symmetric  Skin: warm  Neurologic: Alert and oriented X 3, moves all extremities   Psychiatric: calm, cooperative    Assessment and Plan:     S/p ventral hernia repair  c/b intra-abd bleeding and hypovolemic shock  now s/p hematoma evacuation   Initially on pressors, now off  extubated, awake  Still some bloody output from MARYELLEN  D/w surgery (Nga), will continue transfusions for now, including FFP, and closely monitor, keep NPO    Acute posthemorrhagic anemia resulted in hypovolemic shock  H/o chronic ESRD-related anemia, usually hgb~11, it was 9.6 just prior surgery  S/p 2 PRBC initially post surgery  S/p FFP x2 on   S/p vit k  An additional PRBC the following night after Hgb drop to 6.6 with improvement of Hgb up to 7.7  Then 2 more PRBC because of ongoing bleed, FFP, and an additional dose of DDAVP  --->for the total of 5 units of PRBC so far  -no transfusion today  -current PLT~106, might need  plt transfusions as well if ongoing bleed  -trend Hgb    Leukocytosis, reactive, resolved  -seen by ID consult, no need for Abx at present     ESRD on HD  Hyperkaliemia the morning post op up to ~6, suspect from bleeding  S/p lokelma, insulin, and dialysis-->resolved  -dialysis per nephrology    Acute urinary retention  bladder scan ~450 ml, but pt refused straight cath initially, repeat >700 ml-->luis placed 1/20  -start flomax  -ok to remove luis if pt insists because of discomfort, ideally would keep another day, especially that pt is also constipated  D/w pt at length      H/o HTN, meds on hold  --restart coreg     DM2  -sliding insulin for now    HL  Peripheral nephropathy  CAD, h/o stents    DVT prophylaxis: SCD     Dispo: possibly to floor later today if hgb stable       Chart reviewed, including current vitals, notes, labs and imaging  Pertinent past medical records reviewed  Labs ordered and medications adjusted as outlined above  Coordinate care with care team/consultants  Discussed with patient and family at bedside results of tests, management plan as outlined above, and the need for ongoing hospitalization  D/w RN     MDM high  severe acute illness/or exacerbation of chronic illness posing a threat to life, IV medications, requiring close monitoring in hospital.       Results:     Lab Results   Component Value Date    WBC 7.0 01/21/2024    HGB 8.0 (L) 01/21/2024    HCT 24.6 (L) 01/21/2024    .0 (L) 01/21/2024    CREATSERUM 6.11 (H) 01/21/2024    BUN 29 (H) 01/21/2024     01/21/2024    K 3.8 01/21/2024     01/21/2024    CO2 26.0 01/21/2024     (H) 01/21/2024    CA 8.3 (L) 01/21/2024    ALB 3.4 01/21/2024    ALKPHO 69 01/10/2024    BILT 0.5 01/10/2024    TP 7.5 01/10/2024    AST 19 01/10/2024    ALT 16 01/10/2024    PTT 31.4 01/20/2024    INR 1.06 01/20/2024    TSH 2.960 07/13/2023     (H) 10/16/2023    PSA 0.11 07/13/2023    MG 1.7 01/20/2024    PHOS 4.9 01/21/2024     TROP <0.045 05/20/2021    ETOH <3 05/20/2021       No results found.          EDWIN PURVIS MD  1/21/2024

## 2024-01-22 NOTE — PROGRESS NOTES
[facility]    Progress Note    Shane Hawthorne Patient Status:  Inpatient    7/10/1970 MRN [id number]   Location [facility] 2W/SW Attending Gina Garcia MD   Hosp Day # 4 PCP Jaycob Aleman MD     Chief complaint: shock  Subjective:     feeling better today overall  Díaz removed overnight, voiding  Bloody output from MARYELLEN much slowed down  C/o itchy Rt eye with some discharge  C/o being swollen, especially on Rt side              Objective:   Blood pressure 155/85, pulse 107, temperature 98 °F (36.7 °C), temperature source Temporal, resp. rate 18, height 5' 9\" (1.753 m), weight 213 lb 13.5 oz (97 kg), SpO2 95%.    GEN: acutely and chronically ill, more interactive, looks better  HEENT: conjunctivae/corneas clear. PERRL, EOM's intact.at present no eye discharge, nor redness, pt says, just cleaned  Neck: no adenopathy, no carotid bruit, no JVD, supple  Pulmonary:  decr to auscultation bilaterally  Cardiovascular: S1, S2 normal, no murmur, click, rub or gallop, regular rate and rhythm  Abdominal: soft, tender; bowel sounds+    Extremities: no cyanosis, +edema, more on Rt  Pulses: palpable and symmetric  Skin: warm  Neurologic: Alert and oriented X 3, moves all extremities   Psychiatric: calm, cooperative    Assessment and Plan:     S/p ventral hernia repair  c/b intra-abd bleeding and hypovolemic shock  now s/p hematoma evacuation   Initially on pressors, now off  extubated, awake   bloody output from MARYELLEN improving  Per surgery     Acute posthemorrhagic anemia resulted in hypovolemic shock  H/o chronic ESRD-related anemia, usually hgb~11, it was 9.6 just prior surgery  S/p 2 PRBC initially post surgery  S/p FFP x2 on   S/p vit k  An additional PRBC the following night after Hgb drop to 6.6 with improvement of Hgb up to 7.7  Then 2 more PRBC because of ongoing bleed, FFP, and an additional dose of DDAVP  --->for the total of 5 units of PRBC so far  -no transfusion today  -seems  stable now, cont to monitor closely  -trend Hgb    Leukocytosis, reactive, resolved  -seen by ID consult, no need for Abx at present     ESRD on HD  Hyperkaliemia the morning post op up to ~6, suspect from bleeding  S/p lokelma, insulin, and dialysis-->resolved  -dialysis per nephrology    Acute urinary retention  bladder scan ~450 ml, but pt refused straight cath initially, repeat >700 ml-->luis placed 1/20  -started flomax  -luis removed overnight, due to pt discomfort, so far voiding ok   Also more mobile now    Incr edema, especially Rt arm  -dialysis today  -arm elevation  -also, at home on lasix, now held    H/o HTN, meds on hold initially  --restarted core>incr dose    DM2  -sliding insulin for now    HL  Peripheral nephropathy  CAD, h/o stents-->off antiplatelets for now, restart when ok with surgery    DVT prophylaxis: SCD     Dispo: ok to floor, hgb stable       Chart reviewed, including current vitals, notes, labs and imaging  Pertinent past medical records reviewed  Labs ordered and medications adjusted as outlined above  Coordinate care with care team/consultants  Discussed with patient the results of tests, management plan as outlined above, and the need for ongoing hospitalization  D/w RN     MDM high  severe acute illness/or exacerbation of chronic illness posing a threat to life, IV medications, requiring close monitoring in hospital.       Results:     Lab Results   Component Value Date    WBC 6.4 01/22/2024    HGB 8.4 (L) 01/22/2024    HCT 24.9 (L) 01/22/2024    .0 (L) 01/22/2024    CREATSERUM 6.87 (H) 01/22/2024    BUN 40 (H) 01/22/2024     01/22/2024    K 3.9 01/22/2024     01/22/2024    CO2 26.0 01/22/2024     (H) 01/22/2024    CA 8.5 (L) 01/22/2024    ALB 3.5 01/22/2024    ALKPHO 69 01/10/2024    BILT 0.5 01/10/2024    TP 7.5 01/10/2024    AST 19 01/10/2024    ALT 16 01/10/2024    PTT 31.4 01/20/2024    INR 1.06 01/20/2024    TSH 2.960 07/13/2023     (H)  10/16/2023    PSA 0.11 07/13/2023    MG 1.7 01/20/2024    PHOS 5.3 (H) 01/22/2024    TROP <0.045 05/20/2021    ETOH <3 05/20/2021       No results found.          EDWIN PURVIS MD  1/22/2024

## 2024-01-22 NOTE — PLAN OF CARE
Problem: Patient Centered Care  Goal: Patient preferences are identified and integrated in the patient's plan of care  Description: Interventions:  - What would you like us to know as we care for you? Please knock before entering  - Provide timely, complete, and accurate information to patient/family  - Incorporate patient and family knowledge, values, beliefs, and cultural backgrounds into the planning and delivery of care  - Encourage patient/family to participate in care and decision-making at the level they choose  - Honor patient and family perspectives and choices  Outcome: Progressing     Problem: Diabetes/Glucose Control  Goal: Glucose maintained within prescribed range  Description: INTERVENTIONS:  - Monitor Blood Glucose as ordered  - Assess for signs and symptoms of hyperglycemia and hypoglycemia  - Administer ordered medications to maintain glucose within target range  - Assess barriers to adequate nutritional intake and initiate nutrition consult as needed  - Instruct patient on self management of diabetes  Outcome: Progressing     Problem: Patient/Family Goals  Goal: Patient/Family Long Term Goal  Description: Patient's Long Term Goal: Discharge home    Interventions:  - Get Md to sign FMLA forms  - See additional Care Plan goals for specific interventions  Outcome: Progressing  Goal: Patient/Family Short Term Goal  Description: Patient's Short Term Goal: Manage eye swelling    Interventions:   - Regular sterile saline cleanses  - Eye gtts per orders  - See additional Care Plan goals for specific interventions  Outcome: Progressing     Problem: PAIN - ADULT  Goal: Verbalizes/displays adequate comfort level or patient's stated pain goal  Description: INTERVENTIONS:  - Encourage pt to monitor pain and request assistance  - Assess pain using appropriate pain scale  - Administer analgesics based on type and severity of pain and evaluate response  - Implement non-pharmacological measures as appropriate  and evaluate response  - Consider cultural and social influences on pain and pain management  - Manage/alleviate anxiety  - Utilize distraction and/or relaxation techniques  - Monitor for opioid side effects  - Notify MD/LIP if interventions unsuccessful or patient reports new pain  - Anticipate increased pain with activity and pre-medicate as appropriate  Outcome: Progressing     Problem: RESPIRATORY - ADULT  Goal: Achieves optimal ventilation and oxygenation  Description: INTERVENTIONS:  - Assess for changes in respiratory status  - Assess for changes in mentation and behavior  - Position to facilitate oxygenation and minimize respiratory effort  - Oxygen supplementation based on oxygen saturation or ABGs  - Provide Smoking Cessation handout, if applicable  - Encourage broncho-pulmonary hygiene including cough, deep breathe, Incentive Spirometry  - Assess the need for suctioning and perform as needed  - Assess and instruct to report SOB or any respiratory difficulty  - Respiratory Therapy support as indicated  - Manage/alleviate anxiety  - Monitor for signs/symptoms of CO2 retention  Outcome: Progressing     Problem: SKIN/TISSUE INTEGRITY - ADULT  Goal: Skin integrity remains intact  Description: INTERVENTIONS  - Assess and document risk factors for pressure ulcer development  - Assess and document skin integrity  - Monitor for areas of redness and/or skin breakdown  - Initiate interventions, skin care algorithm/standards of care as needed  Outcome: Progressing  Goal: Incision(s), wounds(s) or drain site(s) healing without S/S of infection  Description: INTERVENTIONS:  - Assess and document risk factors for pressure ulcer development  - Assess and document skin integrity  - Assess and document dressing/incision, wound bed, drain sites and surrounding tissue  - Implement wound care per orders  - Initiate isolation precautions as appropriate  - Initiate Pressure Ulcer prevention bundle as indicated  Outcome:  Progressing     Problem: HEMATOLOGIC - ADULT  Goal: Maintains hematologic stability  Description: INTERVENTIONS  - Assess for signs and symptoms of bleeding or hemorrhage  - Monitor labs and vital signs for trends  - Administer supportive blood products/factors, fluids and medications as ordered and appropriate  - Administer supportive blood products/factors as ordered and appropriate  Outcome: Progressing  Goal: Free from bleeding injury  Description: (Example usage: patient with low platelets)  INTERVENTIONS:  - Avoid intramuscular injections, enemas and rectal medication administration  - Ensure safe mobilization of patient  - Hold pressure on venipuncture sites to achieve adequate hemostasis  - Assess for signs and symptoms of internal bleeding  - Monitor lab trends  - Patient is to report abnormal signs of bleeding to staff  - Avoid use of toothpicks and dental floss  - Use electric shaver for shaving  - Use soft bristle tooth brush  - Limit straining and forceful nose blowing  Outcome: Progressing  Goal: Free from bleeding injury  Description: (Example usage: patient with low platelets)  INTERVENTIONS:  - Avoid intramuscular injections, enemas and rectal medication administration  - Ensure safe mobilization of patient  - Hold pressure on venipuncture sites to achieve adequate hemostasis  - Assess for signs and symptoms of internal bleeding  - Monitor lab trends  - Patient is to report abnormal signs of bleeding to staff  - Avoid use of toothpicks and dental floss  - Use electric shaver for shaving  - Use soft bristle tooth brush  - Limit straining and forceful nose blowing  Outcome: Progressing

## 2024-01-22 NOTE — CM/SW NOTE
Shane was seen by PT on 1/21- recs are for home with intermittent supervision.  Shane requires OP dialysis 3x a week.  Plan is for home with family and resumption of his OP dialysis sessions upon dc.    / to remain available for support and/or discharge planning.      Ct Arriaga MBA BSN RN CRRN   RN Case Manager  [phone]

## 2024-01-22 NOTE — PROGRESS NOTES
[facility]    Progress Note    Shane Hawthorne Patient Status:  Inpatient    7/10/1970 MRN [id number]   Location [facility] 2W/SW Attending Gina Garcia MD   Hosp Day # 3 PCP Jaycob Aleman MD       Subjective:   Shane Hawthorne is a(n) 53 year old male     ROS:     Constitutional: Much better today  ENMT:  Negative for ear drainage, hearing loss and nasal drainage  Eyes:  Negative for eye discharge and vision loss  Cardiovascular:  Negative for chest pain, sob, irregular heartbeat/palpitations  Respiratory:  Negative for cough, dyspnea and wheezing  Gastrointestinal:  N minimal abdominal discomfort  Genitourinary:  Negative for dysuria and hematuria  Endocrine:  Negative for abnormal sleep patterns, increased activity, polydipsia and polyphagia  Hema/Lymph:  Negative for easy bleeding and easy bruising  Integumentary:  Negative for pruritus and rash  Musculoskeletal:  Negative for bone/joint symptoms  Neurological:  Negative for gait disturbance  Psychiatric:  Negative for inappropriate interaction and psychiatric symptoms      Vitals:    24 1700   BP:    Pulse: 107   Resp: (!) 9   Temp:            PHYSICAL EXAM:   Constitutional: appears well hydrated alert and responsive no acute distress noted  Head/Face: normocephalic  Eyes/Vision: normal extraocular motion is intact  Nose/Mouth/Throat:mucous membranes are moist and no oral lesions are noted  Neck/Thyroid: neck is supple without adenopathy  Lymphatic: no abnormal cervical, supraclavicular adenopathy is noted  Respiratory:  lungs are clear to auscultation bilaterally, normal respiratory effort  Cardiovascular: regular rate and rhythm no murmurs, gallups, or rubs  Abdomen: soft, non-tender, non-distended, BS normal has bandage on belly belly nontender  Vascular: well perfused femoral, and pedal pulses normal  Skin/Hair: no unusual rashes present, no abnormal bruising noted  Back/Spine: no abnormalities noted  Musculoskeletal: full  ROM all extremities good strength  no deformities  Extremities: no edema, cyanosis  Neurological:  Grossly normal    Results:     Laboratory Data:  Lab Results   Component Value Date    WBC 7.0 01/21/2024    HGB 8.7 (L) 01/21/2024    HCT 24.6 (L) 01/21/2024    .0 (L) 01/21/2024    CREATSERUM 6.11 (H) 01/21/2024    BUN 29 (H) 01/21/2024     01/21/2024    K 3.8 01/21/2024     01/21/2024    CO2 26.0 01/21/2024     (H) 01/21/2024    CA 8.3 (L) 01/21/2024    ALB 3.4 01/21/2024    ALKPHO 69 01/10/2024    BILT 0.5 01/10/2024    TP 7.5 01/10/2024    AST 19 01/10/2024    ALT 16 01/10/2024    PTT 31.4 01/20/2024    INR 1.06 01/20/2024    TSH 2.960 07/13/2023     (H) 10/16/2023    PSA 0.11 07/13/2023    MG 1.7 01/20/2024    PHOS 4.9 01/21/2024    TROP <0.045 05/20/2021    ETOH <3 05/20/2021       Imaging:  [unfilled]   No results found.      ASSESSMENT/PLAN:   Assessment       #1 ESRD dialysis tomorrow 3 and half hours    #2 hypertension blood pressure is high pain fairly well-controlled we will see if fixing blood pressure with dialysis tomorrow helps    #3 bleeding stable no products needed today received DDAVP yesterday  #4 diabetes sugars okay  Discussed with nurse Mona             1/21/2024  Modesto Ayala MD

## 2024-01-23 ENCOUNTER — APPOINTMENT (OUTPATIENT)
Dept: ULTRASOUND IMAGING | Facility: HOSPITAL | Age: 54
End: 2024-01-23
Attending: HOSPITALIST
Payer: COMMERCIAL

## 2024-01-23 VITALS
OXYGEN SATURATION: 92 % | HEIGHT: 69 IN | BODY MASS INDEX: 31.68 KG/M2 | HEART RATE: 89 BPM | RESPIRATION RATE: 18 BRPM | WEIGHT: 213.88 LBS | TEMPERATURE: 98 F | SYSTOLIC BLOOD PRESSURE: 155 MMHG | DIASTOLIC BLOOD PRESSURE: 95 MMHG

## 2024-01-23 LAB
ALBUMIN SERPL-MCNC: 3.6 G/DL (ref 3.2–4.8)
ANION GAP SERPL CALC-SCNC: 10 MMOL/L (ref 0–18)
BASOPHILS # BLD AUTO: 0.01 X10(3) UL (ref 0–0.2)
BASOPHILS NFR BLD AUTO: 0.2 %
BUN BLD-MCNC: 27 MG/DL (ref 9–23)
BUN/CREAT SERPL: 5.5 (ref 10–20)
CALCIUM BLD-MCNC: 8.5 MG/DL (ref 8.7–10.4)
CHLORIDE SERPL-SCNC: 103 MMOL/L (ref 98–112)
CO2 SERPL-SCNC: 26 MMOL/L (ref 21–32)
CREAT BLD-MCNC: 4.92 MG/DL
DEPRECATED RDW RBC AUTO: 51.6 FL (ref 35.1–46.3)
EGFRCR SERPLBLD CKD-EPI 2021: 13 ML/MIN/1.73M2 (ref 60–?)
EOSINOPHIL # BLD AUTO: 0.3 X10(3) UL (ref 0–0.7)
EOSINOPHIL NFR BLD AUTO: 5.5 %
ERYTHROCYTE [DISTWIDTH] IN BLOOD BY AUTOMATED COUNT: 15.8 % (ref 11–15)
GLUCOSE BLD-MCNC: 146 MG/DL (ref 70–99)
GLUCOSE BLDC GLUCOMTR-MCNC: 152 MG/DL (ref 70–99)
GLUCOSE BLDC GLUCOMTR-MCNC: 157 MG/DL (ref 70–99)
HCT VFR BLD AUTO: 26.4 %
HGB BLD-MCNC: 8.5 G/DL
IMM GRANULOCYTES # BLD AUTO: 0.02 X10(3) UL (ref 0–1)
IMM GRANULOCYTES NFR BLD: 0.4 %
LYMPHOCYTES # BLD AUTO: 0.56 X10(3) UL (ref 1–4)
LYMPHOCYTES NFR BLD AUTO: 10.2 %
MCH RBC QN AUTO: 29.1 PG (ref 26–34)
MCHC RBC AUTO-ENTMCNC: 32.2 G/DL (ref 31–37)
MCV RBC AUTO: 90.4 FL
MONOCYTES # BLD AUTO: 0.67 X10(3) UL (ref 0.1–1)
MONOCYTES NFR BLD AUTO: 12.2 %
NEUTROPHILS # BLD AUTO: 3.93 X10 (3) UL (ref 1.5–7.7)
NEUTROPHILS # BLD AUTO: 3.93 X10(3) UL (ref 1.5–7.7)
NEUTROPHILS NFR BLD AUTO: 71.5 %
OSMOLALITY SERPL CALC.SUM OF ELEC: 296 MOSM/KG (ref 275–295)
PHOSPHATE SERPL-MCNC: 4.2 MG/DL (ref 2.4–5.1)
PLATELET # BLD AUTO: 133 10(3)UL (ref 150–450)
POTASSIUM SERPL-SCNC: 3.6 MMOL/L (ref 3.5–5.1)
RBC # BLD AUTO: 2.92 X10(6)UL
SODIUM SERPL-SCNC: 139 MMOL/L (ref 136–145)
WBC # BLD AUTO: 5.5 X10(3) UL (ref 4–11)

## 2024-01-23 PROCEDURE — 93970 EXTREMITY STUDY: CPT | Performed by: HOSPITALIST

## 2024-01-23 PROCEDURE — 99233 SBSQ HOSP IP/OBS HIGH 50: CPT | Performed by: INTERNAL MEDICINE

## 2024-01-23 PROCEDURE — 99239 HOSP IP/OBS DSCHRG MGMT >30: CPT | Performed by: HOSPITALIST

## 2024-01-23 RX ORDER — CIPROFLOXACIN HYDROCHLORIDE 3.5 MG/ML
1 SOLUTION/ DROPS TOPICAL
Qty: 2.5 ML | Refills: 0 | Status: SHIPPED | OUTPATIENT
Start: 2024-01-23 | End: 2024-01-25

## 2024-01-23 RX ORDER — DOCUSATE SODIUM 100 MG/1
100 CAPSULE, LIQUID FILLED ORAL 2 TIMES DAILY
Status: DISCONTINUED | OUTPATIENT
Start: 2024-01-23 | End: 2024-01-23

## 2024-01-23 RX ORDER — ALBUMIN (HUMAN) 12.5 G/50ML
25 SOLUTION INTRAVENOUS
Status: DISCONTINUED | OUTPATIENT
Start: 2024-01-23 | End: 2024-01-23

## 2024-01-23 RX ORDER — ACETAMINOPHEN 325 MG/1
650 TABLET ORAL EVERY 4 HOURS PRN
Status: SHIPPED | COMMUNITY
Start: 2024-01-23

## 2024-01-23 RX ORDER — POLYETHYLENE GLYCOL 3350 17 G/17G
17 POWDER, FOR SOLUTION ORAL DAILY PRN
Status: DISCONTINUED | OUTPATIENT
Start: 2024-01-23 | End: 2024-01-23

## 2024-01-23 NOTE — PROGRESS NOTES
[facility]    Progress Note      Subjective:     Right arm swelling for last 2 days-per family and patient might be getting worse.  Denies any shortness of breath or chest pain.  Tolerated HD well yesterday      Review of Systems:   Constitutional: negative for fatigue, fevers and weight loss  Eyes: negative for irritation, redness and visual disturbance  Ears, nose, mouth, throat, and face: negative for hearing loss and sore throat  Respiratory: negative for cough, hemoptysis and wheezing  Cardiovascular: negative for chest pain, exertional dyspnea, lower extremity edema   Gastrointestinal: negative for abdominal pain, diarrhea and nausea  Hematologic/lymphatic: negative for bleeding and easy bruising  Musculoskeletal:negative for back pain, bone pain and muscle weakness  Neurological: negative for gait problems, memory problems and seizures  Behavioral/Psych: negative for anxiety and depression    Objective:   Temp:  [98 °F (36.7 °C)-98.9 °F (37.2 °C)] 98.7 °F (37.1 °C)  Pulse:  [] 99  Resp:  [18-28] 21  BP: (130-160)/(74-92) 160/80  SpO2:  [93 %-96 %] 95 %  SpO2: 95 %     Intake/Output Summary (Last 24 hours) at 1/23/2024 1113  Last data filed at 1/23/2024 0600  Gross per 24 hour   Intake --   Output 2060 ml   Net -2060 ml     Wt Readings from Last 3 Encounters:   01/20/24 213 lb 13.5 oz (97 kg)   01/09/24 196 lb (88.9 kg)   12/20/23 196 lb (88.9 kg)       General appearance: alert, appears stated age and cooperative  Head: Normocephalic, atraumatic  Eyes: conjunctivae/corneas clear  Throat: lips, mucosa, and tongue normal; teeth and gums normal  Neck:  no JVD, supple  Extremities: extremities normal, no edema  Skin: No rashes or lesions  Neurologic: Grossly normal  Psychiatric: calm    Medications:               Results:     Recent Labs   Lab 01/19/24  0316 01/19/24  1049 01/20/24  0311 01/20/24  0758 01/21/24  0521 01/21/24  1315 01/21/24  2030 01/22/24  0500 01/23/24  0408   RBC 3.09*   --  2.17*  --  2.70*  --   --  2.75* 2.92*   HGB 9.0*   < > 6.6*   < > 8.0*   < > 8.0* 8.4* 8.5*   HCT 28.7*   < > 19.9*   < > 24.6*  --   --  24.9* 26.4*   MCV 92.9  --  91.7  --  91.1  --   --  90.5 90.4   NEPRELIM 11.48*  --  6.51  --   --   --   --   --  3.93   WBC 13.8*  --  8.6  --  7.0  --   --  6.4 5.5   .0  --  112.0*  --  106.0*  --   --  113.0* 133.0*    < > = values in this interval not displayed.     Recent Labs   Lab 01/21/24  0521 01/22/24  0500 01/23/24  0541   * 149* 146*   BUN 29* 40* 27*   CREATSERUM 6.11* 6.87* 4.92*   CA 8.3* 8.5* 8.5*   ALB 3.4 3.5 3.6    137 139   K 3.8 3.9 3.6    103 103   CO2 26.0 26.0 26.0     PTT   Date Value Ref Range Status   01/20/2024 31.4 23.3 - 35.6 seconds Final     INR   Date Value Ref Range Status   01/20/2024 1.06 0.80 - 1.20 Final     Comment:     Only the INR (not the PT value) should be utilized for   the monitoring of oral anticoagulant therapy.     Recommended therapeutic ranges for anticoagulant therapy are   as follows:   2.0 - 3.0 All indications except for mechanical prosthetic   cardiac valves.     2.5 - 3.5 Mechanical prosthetic cardiac valves.       No results for input(s): \"BNP\" in the last 168 hours.  Recent Labs   Lab 01/18/24  1712 01/19/24 0316 01/20/24  0311 01/20/24  0311 01/21/24  0521 01/22/24  0500 01/23/24  0541   MG 1.9 1.8 1.7  --   --   --   --    PHOS  --   --  3.8   < > 4.9 5.3* 4.2    < > = values in this interval not displayed.        Recent Labs   Lab 01/21/24  0521 01/22/24  0500 01/23/24  0541   PHOS 4.9 5.3* 4.2   ALB 3.4 3.5 3.6       No results found.        Assessment and Plan:       Patient is a 53-year-old male with past medical history of ESRD on HD, diabetes, hypertension who underwent laparoscopic repair of ventral hernia with mesh on June 18..  Course complicated by intra-abdominal hemorrhage and now s/p hematoma evacuation and multiple unit of blood product transfusion    1.ESRD on HD Monday  Wednesday Friday:  Next HD tomorrow  Serum Phos in normal limits -low phos diet  Urinary retention s/p Díaz on 1/20.  On Flomax  Díaz removed yesterday - bladder scan later today     2.blood loss anemia:  Intra-abdominal hemorrhage post ventral hernia repair.  S/p FFP/packed red blood cells/DDAVP  Hemoglobin stable    3.hypertension: On Coreg  Blood pressure better controlled    Discussed with nursing    Venous  Doppler for right upper extremity swelling    Romeo Sherwood MD  1/23/2024

## 2024-01-23 NOTE — OCCUPATIONAL THERAPY NOTE
Orders received, chart reviewed for occupational therapy evaluation. Spoke with RN who reported pt has been up ad miriam/independently throughout the day. Spoke with pt at bedside -- pt declined having any OT needs. Pt observed to move about room /item retrieval with independence. Will sign off.

## 2024-01-23 NOTE — CM/SW NOTE
01/23/24 1400   Discharge disposition   Expected discharge disposition Home or Self   Discharge transportation Private car     Patient is discharging today.  LINH spoke with Shane to inform him to notify that his OP dialysis provider that he will be resuming his OP HD session on Wednesday.  Patient has dialysis M, W, F,      Ct Arriaga MBA BSN RN CRRN   RN Case Manager  [phone]

## 2024-01-23 NOTE — TELEPHONE ENCOUNTER
Refill passed per [facility] protocol.      Requested Prescriptions   Pending Prescriptions Disp Refills    atorvastatin 40 MG Oral Tab 30 tablet 2     Sig: Take 1 tablet (40 mg total) by mouth nightly.       Cholesterol Medication Protocol Passed - 1/22/2024  2:29 PM        Passed - ALT in past 12 months        Passed - LDL in past 12 months        Passed - Last ALT < 80     Lab Results   Component Value Date    ALT 16 01/10/2024             Passed - Last LDL < 130     Lab Results   Component Value Date    LDL 35 07/13/2023             Passed - In person appointment or virtual visit in the past 12 mos or appointment in next 3 mos     Recent Outpatient Visits              1 week ago Snoring    [facility]    [facility]medicine    1 week ago Preop exam for internal medicine    [facility] Mahesh Ford Amit, MD    Office Visit    1 month ago Ventral hernia without obstruction or gangrene    [facility] Pelon Gomes MD    Office Visit    3 months ago Gastroenteritis    [facility]    Office Visit    6 months ago Annual physical exam    [facility]    Office Visit          Future Appointments         Provider Department Appt Notes    In 1 month [facility] SLEEP [facility] [id number]

## 2024-01-23 NOTE — PLAN OF CARE
Discussed GOC with patient - wants rest tonight, minimize interruptions.  R arm swollen and sore from frequent BP checks - utilizing manual cuff  Abdominal incisions & MARYELLEN intact  C/O L arm soreness at site of PICC, flushes well without pain, blood drawback in both lines. Site is bruised but soft and not edematous. Will continue to closely monitor.  C/O bilateral eye w/ irritation, R eye drops given (has discharge), states he wants to ask doctor to change order to bilateral eye drops. Given artificial tears to utilize for dryness overnight as well  Wife is at bedside    Problem: Patient Centered Care  Goal: Patient preferences are identified and integrated in the patient's plan of care  Description: Interventions:  - What would you like us to know as we care for you? From home with spouse, independent at baseline  - Provide timely, complete, and accurate information to patient/family  - Incorporate patient and family knowledge, values, beliefs, and cultural backgrounds into the planning and delivery of care  - Encourage patient/family to participate in care and decision-making at the level they choose  - Honor patient and family perspectives and choices  Outcome: Progressing     Problem: Diabetes/Glucose Control  Goal: Glucose maintained within prescribed range  Description: INTERVENTIONS:  - Monitor Blood Glucose as ordered  - Assess for signs and symptoms of hyperglycemia and hypoglycemia  - Administer ordered medications to maintain glucose within target range  - Assess barriers to adequate nutritional intake and initiate nutrition consult as needed  - Instruct patient on self management of diabetes  Outcome: Progressing     Problem: Patient/Family Goals  Goal: Patient/Family Long Term Goal  Description: Patient's Long Term Goal: discharge    Interventions:  - discharge planning  - See additional Care Plan goals for specific interventions  Outcome: Progressing  Goal: Patient/Family Short Term Goal  Description:  Patient's Short Term Goal: minimize interruptions overnight    Interventions:   - patient requesting \"loudly knock before entering\"  - cluster care  - See additional Care Plan goals for specific interventions  Outcome: Progressing     Problem: PAIN - ADULT  Goal: Verbalizes/displays adequate comfort level or patient's stated pain goal  Description: INTERVENTIONS:  - Encourage pt to monitor pain and request assistance  - Assess pain using appropriate pain scale  - Administer analgesics based on type and severity of pain and evaluate response  - Implement non-pharmacological measures as appropriate and evaluate response  - Consider cultural and social influences on pain and pain management  - Manage/alleviate anxiety  - Utilize distraction and/or relaxation techniques  - Monitor for opioid side effects  - Notify MD/LIP if interventions unsuccessful or patient reports new pain  - Anticipate increased pain with activity and pre-medicate as appropriate  Outcome: Progressing     Problem: RESPIRATORY - ADULT  Goal: Achieves optimal ventilation and oxygenation  Description: INTERVENTIONS:  - Assess for changes in respiratory status  - Assess for changes in mentation and behavior  - Position to facilitate oxygenation and minimize respiratory effort  - Oxygen supplementation based on oxygen saturation or ABGs  - Provide Smoking Cessation handout, if applicable  - Encourage broncho-pulmonary hygiene including cough, deep breathe, Incentive Spirometry  - Assess the need for suctioning and perform as needed  - Assess and instruct to report SOB or any respiratory difficulty  - Respiratory Therapy support as indicated  - Manage/alleviate anxiety  - Monitor for signs/symptoms of CO2 retention  Outcome: Progressing     Problem: SKIN/TISSUE INTEGRITY - ADULT  Goal: Skin integrity remains intact  Description: INTERVENTIONS  - Assess and document risk factors for pressure ulcer development  - Assess and document skin integrity  -  Monitor for areas of redness and/or skin breakdown  - Initiate interventions, skin care algorithm/standards of care as needed  Outcome: Progressing  Goal: Incision(s), wounds(s) or drain site(s) healing without S/S of infection  Description: INTERVENTIONS:  - Assess and document risk factors for pressure ulcer development  - Assess and document skin integrity  - Assess and document dressing/incision, wound bed, drain sites and surrounding tissue  - Implement wound care per orders  - Initiate isolation precautions as appropriate  - Initiate Pressure Ulcer prevention bundle as indicated  Outcome: Progressing     Problem: HEMATOLOGIC - ADULT  Goal: Maintains hematologic stability  Description: INTERVENTIONS  - Assess for signs and symptoms of bleeding or hemorrhage  - Monitor labs and vital signs for trends  - Administer supportive blood products/factors, fluids and medications as ordered and appropriate  - Administer supportive blood products/factors as ordered and appropriate  Outcome: Progressing  Goal: Free from bleeding injury  Description: (Example usage: patient with low platelets)  INTERVENTIONS:  - Avoid intramuscular injections, enemas and rectal medication administration  - Ensure safe mobilization of patient  - Hold pressure on venipuncture sites to achieve adequate hemostasis  - Assess for signs and symptoms of internal bleeding  - Monitor lab trends  - Patient is to report abnormal signs of bleeding to staff  - Avoid use of toothpicks and dental floss  - Use electric shaver for shaving  - Use soft bristle tooth brush  - Limit straining and forceful nose blowing  Outcome: Progressing  Goal: Free from bleeding injury  Description: (Example usage: patient with low platelets)  INTERVENTIONS:  - Avoid intramuscular injections, enemas and rectal medication administration  - Ensure safe mobilization of patient  - Hold pressure on venipuncture sites to achieve adequate hemostasis  - Assess for signs and symptoms  of internal bleeding  - Monitor lab trends  - Patient is to report abnormal signs of bleeding to staff  - Avoid use of toothpicks and dental floss  - Use electric shaver for shaving  - Use soft bristle tooth brush  - Limit straining and forceful nose blowing  Outcome: Progressing

## 2024-01-23 NOTE — PROGRESS NOTES
[facility]    Progress Note    Shane Hawthorne Patient Status:  Inpatient    7/10/1970 MRN [id number]   Location [facility] 2W/SW Attending Carmelo Harvey MD   Hosp Day # 5 PCP Jaycob Aleman MD       Subjective:   Pt feeling better today. Pain controlled, getting US of right arm due to swelling.    Objective:   Vital Signs:  Blood pressure 160/80, pulse 99, temperature 98.7 °F (37.1 °C), temperature source Temporal, resp. rate 21, height 5' 9\" (1.753 m), weight 213 lb 13.5 oz (97 kg), SpO2 95%.    Physical Exam     General: No acute distress. Alert and oriented x 3.  HEENT: Moist mucous membranes. Anicteric.   Neck: Supple, No JVD.   Respiratory: Nonlabored resp.  Cardiovascular: Regular rate.   Abdomen: Soft, NT, no rebound tnd, no guarding, nondistended.  No masses. Normal bowel sounds. Incisions c/d/I, MARYELLEN bloody.  Neurologic: No focal neurological deficits.  Musculoskeletal: Full range of motion of all extremities. No calf tenderness. No swelling noted.  Integument: No lesions. No erythema.  Psychiatric: Appropriate mood and affect.         Assessment and Plan:     Abdominal pain      Post-operative pain      ESRD on hemodialysis (HCC)      Blood loss anemia      Hyperphosphatemia    MARYELLEN drain removed, follow up US results. Hgb stable. Pt stable for discharge home from surgical standpoint. Follow up in 1-2 weeks for postoperative appointment.     Results:     Lab Results   Component Value Date    WBC 5.5 2024    HGB 8.5 (L) 2024    HCT 26.4 (L) 2024    .0 (L) 2024    CREATSERUM 4.92 (H) 2024    BUN 27 (H) 2024     2024    K 3.6 2024     2024    CO2 26.0 2024     (H) 2024    CA 8.5 (L) 2024    ALB 3.6 2024    ALKPHO 69 01/10/2024    BILT 0.5 01/10/2024    TP 7.5 01/10/2024    AST 19 01/10/2024    ALT 16 01/10/2024    PTT 31.4 2024    INR 1.06 2024    TSH 2.960 2023    LIP  118 (H) 10/16/2023    PSA 0.11 07/13/2023    MG 1.7 01/20/2024    PHOS 4.2 01/23/2024    TROP <0.045 05/20/2021    ETOH <3 05/20/2021       US VENOUS DOPPLER ARM BILAT - DIAG IMG (CPT=93970)    Result Date: 1/23/2024  CONCLUSION:   Nonocclusive thrombus in the left mid subclavian vein adjacent to the PICC  No evidence of right upper extremity deep venous thrombosis.    Dictated by (CST): Riaz De Jesus MD on 1/23/2024 at 12:08 PM     Finalized by (CST): Riaz De Jesus MD on 1/23/2024 at 12:13 PM                       Riaz Jackson PA-C  1/23/2024

## 2024-01-23 NOTE — PHYSICAL THERAPY NOTE
PHYSICAL THERAPY TREATMENT NOTE - INPATIENT     Room Number: 238/238-A       Presenting Problem: POD 4 lap VHR, POD 3 diagnostic lap, hematoma evacuation. Hgb stable, pain improved.  Co-Morbidities : End-stage renal disease, on hemodialysis.  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    Problem List  Active Problems:    Abdominal pain    Post-operative pain    ESRD on hemodialysis (HCC)    Blood loss anemia    Hyperphosphatemia      PHYSICAL THERAPY ASSESSMENT     Patient in bed upon arrival. RN approved activity. Educated patient on POC and benefits of mobilization. Agreeable to participate. Patient reporting abdominal pain, not quantified per the pain scale. Patient has made great progress towards IP PT goals, tolerating increased activity without assistance. Patient denying any functional mobility concerns at this time. Based on patient demonstrating good understanding of post operative protocol and safe functional mobility observed this date, no further skilled IP PT required at this time. RN made aware. Patient agreeable. Safe for DC to home from a PT standpoint.     Bed Mobility: Independent supine>EOB. Patient tolerated sitting EOB approx 1 minute, independently maintaining static sitting balance.  Transfers: Independent sit<>stand without assistive device. Tolerated static standing unsupported independently for approx 1 minute prior to mobilization.  Ambulation: Independent without assistive device for 150 ft; decreased garrett speed, shuffled steps.  Stair Negotiation: SBA ascend/descend 13 stairs in a reciprocal pattern. Patient able to slowly and safely negotiate stairs in this manner.    Patient standing at the edge of the bed at end of session. Needs  in reach. RN aware.      The patient's Approx Degree of Impairment: 11.2% has been calculated based on documentation in the [facility] '6 clicks' Inpatient Basic Mobility Short Form.  Research supports that patients with this level of impairment may benefit from home with increased supervision/assist.    DISCHARGE RECOMMENDATIONS  PT Discharge Recommendations: Home (increased supervision/assist)     PLAN  PT Treatment Plan: Bed mobility;Body mechanics;Energy conservation;Gait training;Neuromuscular re-educate;Strengthening;Stair training;Transfer training  Frequency (Obs): 3-5x/week    SUBJECTIVE  \"It's better\" - referring to abdominal pain     OBJECTIVE  Precautions: Abdominal protective strategies;Limb alert - left    PAIN ASSESSMENT   Rating: Unable to rate  Location: abdomen  Management Techniques: Activity promotion;Body mechanics;Repositioning    BALANCE  Static Sitting: Normal  Dynamic Sitting: Normal  Static Standing: Good  Dynamic Standing: Fair +    ACTIVITY TOLERANCE  Pulse: 89  Heart Rate Source: Monitor     O2 WALK  Oxygen Therapy  SPO2% on Room Air at Rest: 94    AM-PAC '6-Clicks' INPATIENT SHORT FORM - BASIC MOBILITY  How much difficulty does the patient currently have...  Patient Difficulty: Turning over in bed (including adjusting bedclothes, sheets and blankets)?: None   Patient Difficulty: Sitting down on and standing up from a chair with arms (e.g., wheelchair, bedside commode, etc.): None   Patient Difficulty: Moving from lying on back to sitting on the side of the bed?: None   How much help from another person does the patient currently need...   Help from Another: Moving to and from a bed to a chair (including a wheelchair)?: None   Help from Another: Need to walk in hospital room?: None   Help from Another: Climbing 3-5 steps with a railing?: A Little     AM-PAC Score:  Raw Score: 23   Approx Degree of Impairment: 11.2%   Standardized Score (AM-PAC Scale): 56.93   CMS Modifier (G-Code):  CI    FUNCTIONAL ABILITY STATUS  Functional Mobility/Gait Assessment  Gait Assistance: Independent  Distance (ft): 150 ft  Assistive Device: None  Pattern: Shuffle (decreased garrett speed)  Stairs: Stairs  How Many Stairs: 13  Device: None  Assist:  (Stand by assist)  Pattern: Ascend and Descend  Ascend and Descend : Reciprocal    Patient End of Session: Needs met;Call light within reach;RN aware of session/findings;All patient questions and concerns addressed (standing at the EOB)    CURRENT GOALS   Goals to be met by: 1/28/24  Patient Goal Patient's self-stated goal is: to return home   Goal #1 Patient is able to demonstrate supine - sit EOB @ level: independent      Goal #1   Current Status  Goal met 1/23/24: Independent    Goal #2 Patient is able to demonstrate transfers Sit to/from Stand at assistance level: independent with none   Goal #2  Current Status  Goal met 1/23/24: Independent    Goal #3 Patient is able to ambulate >300 feet with assist device: none at assistance level: independent   Goal #3   Current Status  Independent 150 ft no AD   Goal #4 Patient will negotiate 5 stairs/one curb w/ assistive device and supervision   Goal #4   Current Status  SBA ascend/descend 13 stairs with no railing   Goal #5 Patient to demonstrate independence with home activity/exercise instructions provided to patient in preparation for discharge.   Goal #5   Current Status  Ongoing     Gait Training: 10 minutes

## 2024-01-23 NOTE — PLAN OF CARE
Pt alert and oriented. Vitals stable. Pt cleared for discharge. Will discharge home with picc line in left arm. Follow up ultrasound in one week.   Problem: Patient Centered Care  Goal: Patient preferences are identified and integrated in the patient's plan of care  Description: Interventions:  - What would you like us to know as we care for you?   - Provide timely, complete, and accurate information to patient/family  - Incorporate patient and family knowledge, values, beliefs, and cultural backgrounds into the planning and delivery of care  - Encourage patient/family to participate in care and decision-making at the level they choose  - Honor patient and family perspectives and choices  Outcome: Progressing     Problem: Diabetes/Glucose Control  Goal: Glucose maintained within prescribed range  Description: INTERVENTIONS:  - Monitor Blood Glucose as ordered  - Assess for signs and symptoms of hyperglycemia and hypoglycemia  - Administer ordered medications to maintain glucose within target range  - Assess barriers to adequate nutritional intake and initiate nutrition consult as needed  - Instruct patient on self management of diabetes  Outcome: Progressing     Problem: Patient/Family Goals  Goal: Patient/Family Long Term Goal  Description: Patient's Long Term Goal:     Interventions:  -   - See additional Care Plan goals for specific interventions  Outcome: Progressing  Goal: Patient/Family Short Term Goal  Description: Patient's Short Term Goal:     Interventions:   -   - See additional Care Plan goals for specific interventions  Outcome: Progressing     Problem: PAIN - ADULT  Goal: Verbalizes/displays adequate comfort level or patient's stated pain goal  Description: INTERVENTIONS:  - Encourage pt to monitor pain and request assistance  - Assess pain using appropriate pain scale  - Administer analgesics based on type and severity of pain and evaluate response  - Implement non-pharmacological measures as  appropriate and evaluate response  - Consider cultural and social influences on pain and pain management  - Manage/alleviate anxiety  - Utilize distraction and/or relaxation techniques  - Monitor for opioid side effects  - Notify MD/LIP if interventions unsuccessful or patient reports new pain  - Anticipate increased pain with activity and pre-medicate as appropriate  Outcome: Progressing     Problem: RESPIRATORY - ADULT  Goal: Achieves optimal ventilation and oxygenation  Description: INTERVENTIONS:  - Assess for changes in respiratory status  - Assess for changes in mentation and behavior  - Position to facilitate oxygenation and minimize respiratory effort  - Oxygen supplementation based on oxygen saturation or ABGs  - Provide Smoking Cessation handout, if applicable  - Encourage broncho-pulmonary hygiene including cough, deep breathe, Incentive Spirometry  - Assess the need for suctioning and perform as needed  - Assess and instruct to report SOB or any respiratory difficulty  - Respiratory Therapy support as indicated  - Manage/alleviate anxiety  - Monitor for signs/symptoms of CO2 retention  Outcome: Progressing     Problem: SKIN/TISSUE INTEGRITY - ADULT  Goal: Skin integrity remains intact  Description: INTERVENTIONS  - Assess and document risk factors for pressure ulcer development  - Assess and document skin integrity  - Monitor for areas of redness and/or skin breakdown  - Initiate interventions, skin care algorithm/standards of care as needed  Outcome: Progressing  Goal: Incision(s), wounds(s) or drain site(s) healing without S/S of infection  Description: INTERVENTIONS:  - Assess and document risk factors for pressure ulcer development  - Assess and document skin integrity  - Assess and document dressing/incision, wound bed, drain sites and surrounding tissue  - Implement wound care per orders  - Initiate isolation precautions as appropriate  - Initiate Pressure Ulcer prevention bundle as  indicated  Outcome: Progressing     Problem: HEMATOLOGIC - ADULT  Goal: Maintains hematologic stability  Description: INTERVENTIONS  - Assess for signs and symptoms of bleeding or hemorrhage  - Monitor labs and vital signs for trends  - Administer supportive blood products/factors, fluids and medications as ordered and appropriate  - Administer supportive blood products/factors as ordered and appropriate  Outcome: Progressing  Goal: Free from bleeding injury  Description: (Example usage: patient with low platelets)  INTERVENTIONS:  - Avoid intramuscular injections, enemas and rectal medication administration  - Ensure safe mobilization of patient  - Hold pressure on venipuncture sites to achieve adequate hemostasis  - Assess for signs and symptoms of internal bleeding  - Monitor lab trends  - Patient is to report abnormal signs of bleeding to staff  - Avoid use of toothpicks and dental floss  - Use electric shaver for shaving  - Use soft bristle tooth brush  - Limit straining and forceful nose blowing  Outcome: Progressing  Goal: Free from bleeding injury  Description: (Example usage: patient with low platelets)  INTERVENTIONS:  - Avoid intramuscular injections, enemas and rectal medication administration  - Ensure safe mobilization of patient  - Hold pressure on venipuncture sites to achieve adequate hemostasis  - Assess for signs and symptoms of internal bleeding  - Monitor lab trends  - Patient is to report abnormal signs of bleeding to staff  - Avoid use of toothpicks and dental floss  - Use electric shaver for shaving  - Use soft bristle tooth brush  - Limit straining and forceful nose blowing  Outcome: Progressing

## 2024-01-23 NOTE — PROGRESS NOTES
INFECTIOUS DISEASE PROGRESS NOTE    Shane Hawthorne Patient Status:  Inpatient    7/10/1970 MRN [id number]   Location [facility] 2W/SW Attending Gina Garcia MD   Hosp Day # 5 PCP Jaycob Aleman MD     SUBJECTIVE  Patient seen and examined, chart, notes, labs reviewed  On phone, doesn't want to be disturbed  No fever  Wbc 5.5    Antibiotics: ancef x 1 dose pre-op     ASSESSMENT:     # S/p ventral hernia repair  c/b intra-abd bleeding now s/p hematoma evacuation   # leukocytosis likely reactive, resolved  # hypotensive shock in the setting of acute bleed, resolved  # ESRD on HD     PLAN:     -  continue to monitor off abx.    -  Reviewed labs, micro, imaging reports, available old records  -  ok for DC from ID standpoint  -  d/w patient  -  d/w staff  -  will follow as needed    OBJECTIVE  BP (!) 155/95 (BP Location: Right arm)   Pulse 89   Temp 98.2 °F (36.8 °C) (Temporal)   Resp 18   Ht 175.3 cm (5' 9\")   Wt 213 lb 13.5 oz (97 kg)   SpO2 92%   BMI 31.58 kg/m²     General: No acute distress.  Sleeping  HEENT: EOMI   Abdomen: Abdominal binder in place, MARYELLEN drain  Musculoskeletal: No edema  Integument: No rashes  R chest permacath    Labs:     Recent Labs   Lab 24  0316 24  1049 24  0311 24  0758 24  0521 24  1315 24  2030 24  0500 24  0408   WBC 13.8*  --  8.6  --  7.0  --   --  6.4 5.5   HGB 9.0*   < > 6.6*   < > 8.0* 8.7* 8.0* 8.4* 8.5*   .0  --  112.0*  --  106.0*  --   --  113.0* 133.0*    < > = values in this interval not displayed.       Recent Labs   Lab 24  0316 24  0311 24  0521 24  0500 24  0541    137 137 137 139   K 6.0* 3.8 3.8 3.9 3.6   * 102 103 103 103   CO2 21.0 28.0 26.0 26.0 26.0   BUN 30* 23 29* 40* 27*   CREATSERUM 5.79* 4.86* 6.11* 6.87* 4.92*       No results for input(s): \"ALT\", \"AST\" in the last 168 hours.    Invalid input(s): \"ALPHOS\", \"TBIL\"    Microbiology:  Reviewed    Imaging: Reviewed     [facility]  [phone]

## 2024-01-23 NOTE — PLAN OF CARE
Pt a&ox4 over PM. Pt refusing machine blood pressures, only wants manual blood pressures when awake. Pt reporting mild abdominal pain, refusing medication at this time for pain. Pt safety maintained, bed remains in lowest position with wheels locked. Pt updated on POC and all questions answered.   Problem: Patient Centered Care  Goal: Patient preferences are identified and integrated in the patient's plan of care  Description: Interventions:  - Provide timely, complete, and accurate information to patient/family  - Incorporate patient and family knowledge, values, beliefs, and cultural backgrounds into the planning and delivery of care  - Encourage patient/family to participate in care and decision-making at the level they choose  - Honor patient and family perspectives and choices  Outcome: Progressing     Problem: Diabetes/Glucose Control  Goal: Glucose maintained within prescribed range  Description: INTERVENTIONS:  - Monitor Blood Glucose as ordered  - Assess for signs and symptoms of hyperglycemia and hypoglycemia  - Administer ordered medications to maintain glucose within target range  - Assess barriers to adequate nutritional intake and initiate nutrition consult as needed  - Instruct patient on self management of diabetes  Outcome: Progressing     Problem: Patient/Family Goals  Goal: Patient/Family Long Term Goal  Description: Patient's Long Term Goal: cardiac healthy lifestyle    Interventions:  - diet, lifestyle modification, medication, teaching  - See additional Care Plan goals for specific interventions  Outcome: Progressing  Goal: Patient/Family Short Term Goal  Description: Patient's Short Term Goal: pain control    Interventions:   - distraction, medication  - See additional Care Plan goals for specific interventions  Outcome: Progressing     Problem: PAIN - ADULT  Goal: Verbalizes/displays adequate comfort level or patient's stated pain goal  Description: INTERVENTIONS:  - Encourage pt to monitor  pain and request assistance  - Assess pain using appropriate pain scale  - Administer analgesics based on type and severity of pain and evaluate response  - Implement non-pharmacological measures as appropriate and evaluate response  - Consider cultural and social influences on pain and pain management  - Manage/alleviate anxiety  - Utilize distraction and/or relaxation techniques  - Monitor for opioid side effects  - Notify MD/LIP if interventions unsuccessful or patient reports new pain  - Anticipate increased pain with activity and pre-medicate as appropriate  Outcome: Progressing     Problem: RESPIRATORY - ADULT  Goal: Achieves optimal ventilation and oxygenation  Description: INTERVENTIONS:  - Assess for changes in respiratory status  - Assess for changes in mentation and behavior  - Position to facilitate oxygenation and minimize respiratory effort  - Oxygen supplementation based on oxygen saturation or ABGs  - Provide Smoking Cessation handout, if applicable  - Encourage broncho-pulmonary hygiene including cough, deep breathe, Incentive Spirometry  - Assess the need for suctioning and perform as needed  - Assess and instruct to report SOB or any respiratory difficulty  - Respiratory Therapy support as indicated  - Manage/alleviate anxiety  - Monitor for signs/symptoms of CO2 retention  Outcome: Progressing     Problem: SKIN/TISSUE INTEGRITY - ADULT  Goal: Skin integrity remains intact  Description: INTERVENTIONS  - Assess and document risk factors for pressure ulcer development  - Assess and document skin integrity  - Monitor for areas of redness and/or skin breakdown  - Initiate interventions, skin care algorithm/standards of care as needed  Outcome: Progressing  Goal: Incision(s), wounds(s) or drain site(s) healing without S/S of infection  Description: INTERVENTIONS:  - Assess and document risk factors for pressure ulcer development  - Assess and document skin integrity  - Assess and document  dressing/incision, wound bed, drain sites and surrounding tissue  - Implement wound care per orders  - Initiate isolation precautions as appropriate  - Initiate Pressure Ulcer prevention bundle as indicated  Outcome: Progressing     Problem: HEMATOLOGIC - ADULT  Goal: Maintains hematologic stability  Description: INTERVENTIONS  - Assess for signs and symptoms of bleeding or hemorrhage  - Monitor labs and vital signs for trends  - Administer supportive blood products/factors, fluids and medications as ordered and appropriate  - Administer supportive blood products/factors as ordered and appropriate  Outcome: Progressing  Goal: Free from bleeding injury  Description: (Example usage: patient with low platelets)  INTERVENTIONS:  - Avoid intramuscular injections, enemas and rectal medication administration  - Ensure safe mobilization of patient  - Hold pressure on venipuncture sites to achieve adequate hemostasis  - Assess for signs and symptoms of internal bleeding  - Monitor lab trends  - Patient is to report abnormal signs of bleeding to staff  - Avoid use of toothpicks and dental floss  - Use electric shaver for shaving  - Use soft bristle tooth brush  - Limit straining and forceful nose blowing  Outcome: Progressing  Goal: Free from bleeding injury  Description: (Example usage: patient with low platelets)  INTERVENTIONS:  - Avoid intramuscular injections, enemas and rectal medication administration  - Ensure safe mobilization of patient  - Hold pressure on venipuncture sites to achieve adequate hemostasis  - Assess for signs and symptoms of internal bleeding  - Monitor lab trends  - Patient is to report abnormal signs of bleeding to staff  - Avoid use of toothpicks and dental floss  - Use electric shaver for shaving  - Use soft bristle tooth brush  - Limit straining and forceful nose blowing  Outcome: Progressing

## 2024-01-24 ENCOUNTER — TELEPHONE (OUTPATIENT)
Dept: INTERNAL MEDICINE CLINIC | Facility: CLINIC | Age: 54
End: 2024-01-24

## 2024-01-24 ENCOUNTER — TELEPHONE (OUTPATIENT)
Dept: SURGERY | Facility: CLINIC | Age: 54
End: 2024-01-24

## 2024-01-24 ENCOUNTER — PATIENT OUTREACH (OUTPATIENT)
Dept: CASE MANAGEMENT | Age: 54
End: 2024-01-24

## 2024-01-24 ENCOUNTER — HOSPITAL ENCOUNTER (EMERGENCY)
Facility: HOSPITAL | Age: 54
Discharge: HOME | End: 2024-01-24
Attending: EMERGENCY MEDICINE
Payer: COMMERCIAL

## 2024-01-24 VITALS
DIASTOLIC BLOOD PRESSURE: 88 MMHG | OXYGEN SATURATION: 97 % | SYSTOLIC BLOOD PRESSURE: 165 MMHG | HEART RATE: 98 BPM | RESPIRATION RATE: 18 BRPM | TEMPERATURE: 98 F

## 2024-01-24 DIAGNOSIS — Z99.2 NEED FOR ACUTE HEMODIALYSIS (HCC): Primary | ICD-10-CM

## 2024-01-24 DIAGNOSIS — Z02.9 ENCOUNTERS FOR UNSPECIFIED ADMINISTRATIVE PURPOSE: ICD-10-CM

## 2024-01-24 DIAGNOSIS — K43.6 INCARCERATED VENTRAL HERNIA: Primary | ICD-10-CM

## 2024-01-24 PROCEDURE — 1111F DSCHRG MED/CURRENT MED MERGE: CPT

## 2024-01-24 PROCEDURE — 99282 EMERGENCY DEPT VISIT SF MDM: CPT

## 2024-01-24 NOTE — DISCHARGE SUMMARY
[facility]     DISCHARGE SUMMARY     Shane Hawthorne Patient Status:  Inpatient    7/10/1970 MRN [id number]   Location [facility] 2W/SW Attending No att. providers found   Hosp Day # 5 PCP Jayocb Aleman MD     DATE OF ADMISSION: 2024  DATE OF DISCHARGE: 2024   DISPOSITION: home  CONDITION ON DISCHARGE: good    DISCHARGE DIAGNOSES:  Status post ventral hernia repair   Intra-abdominal bleeding  Hemorrhagic shock  Acute blood loss anemia  End-stage renal disease on hemodialysis  Urinary retention  Small nonocclusive left subclavian DVT associated with PICC line    POSTOPERATIVE DIAGNOSIS:  Incarcerated ventral hernia measuring 6 cm.  PROCEDURE:  Laparoscopic hybrid repair of ventral hernia with mesh, lysis of adhesions.    POSTOPERATIVE DIAGNOSIS:  Intra-abdominal hemorrhage.  PROCEDURE:  Diagnostic laparoscopy, evacuation of intra-abdominal bleeding, placement of Peter, Sincere-Carrillo placement.    HISTORY OF PRESENT ILLNESS (COPIED FROM ADMISSION H&P)  Patient is a 53-year-old  male, end-stage renal disease, on hemodialysis.  Last dialysis was yesterday.  Today, he had laparoscopic ventral hernia repair with a mesh and lysis of adhesions.  Postoperatively, he was noted to have a systolic blood pressure of 80.  He received 1 L of normal saline and blood pressure continued to be low, 70 to 80 systolic.  Also, he has been complaining of lower to mid abdominal pain.  No shortness of breath.     HOSPITAL COURSE:  After elective ventral hernia repair, patient was admitted to the hospital.  He developed signs and symptoms of intra-abdominal bleeding and was taken back to the OR that same night for diagnostic laparoscopy.  Hemostasis was achieved.  Patient experienced hemorrhagic shock and required pressor support for short period of time.  He required significant transfusion support as well.  Slowly improved after that.  Diet was advanced, cleared by general surgery for discharge.   The patient noted some swelling in his right arm which is not that unusual for him.  He may have had some soft tissue fluid retention related to previous musculoskeletal injury and dialysis status.  However in investigation of this right arm swelling it was noted that he had a left subclavian nonocclusive small thrombus associated with PICC line.  Case discussed with interventional radiology Dr Jimenez via APN and it was decided that the best course of action given his recent bleeding issues was to monitor clinically.  It was advised to not remove the PICC line as this may dislodge it.  He will undergo repeat Doppler ultrasound of the left arm in about a week and then discussed with his primary care physician, surgeon, or interventional radiology the next steps.  If DVT persists, may be appropriate to anticoagulate at that time.  I discussed the case with Dr. Gomes of general surgery who had recommended no anticoagulation due to his risk of bleeding.    Patient understands return to the emergency room for increased pain, fever, discharge, shortness of breath, chest pain, new neurologic symptoms, other concerning symptoms.    PHYSICAL EXAM:     Gen: A+Ox3.  No distress.   HEENT: NCAT, neck supple, no carotid bruit.  CV: RRR, S1S2, and intact distal pulses. No gallop, rub, murmur.  Pulm: Effort and breath sounds normal. No distress, wheezes, rales, rhonchi.  Abd: Soft, NTND, BS normal, no mass, no HSM, no rebound/guarding.   Neuro: Normal reflexes, CN. Sensory/motor exams grossly normal deficit. Coordination  and gait normal.   MS: No joint effusions.  No peripheral edema.  Skin: Skin is warm and dry. No rashes, erythema, diaphoresis.   Psych: Normal mood and affect. Behavior and judgment normal.     DISCHARGE MEDICATIONS     Discharge Medications        START taking these medications        Instructions Prescription details   acetaminophen 325 MG Tabs  Commonly known as: Tylenol      Take 2 tablets (650 mg total) by  mouth every 4 (four) hours as needed.   Refills: 0     ciprofloxacin 0.3 % Soln  Commonly known as: Ciloxan      Place 1 drop into the right eye every 4 (four) hours while awake for 2 days.   Stop taking on: January 25, 2024  Quantity: 2.5 mL  Refills: 0     docusate sodium 100 MG Caps  Commonly known as: Colace      Take 1 capsule (100 mg total) by mouth 2 (two) times daily.   Quantity: 30 capsule  Refills: 0     HYDROcodone-acetaminophen  MG Tabs  Commonly known as: Norco      Take 1 tablet by mouth every 4 (four) hours as needed for Pain.   Quantity: 30 tablet  Refills: 0            CONTINUE taking these medications        Instructions Prescription details   albuterol 108 (90 Base) MCG/ACT Aers  Commonly known as: Ventolin HFA      INHALE 2 PUFFS INTO THE LUNGS EVERY 6 HOURS AS NEEDED FOR WHEEZING FOR UP TO 30 DAYS.   Quantity: 8.5 each  Refills: 3     albuterol (2.5 MG/3ML) 0.083% Nebu  Commonly known as: Ventolin      Take 3 mL (2.5 mg total) by nebulization every 4 (four) hours as needed for Wheezing or Shortness of Breath.   Quantity: 75 mL  Refills: 0     atorvastatin 40 MG Tabs  Commonly known as: Lipitor      Take 1 tablet (40 mg total) by mouth nightly.   Quantity: 90 tablet  Refills: 3     carvedilol 12.5 MG Tabs  Commonly known as: Coreg      Take 1 tablet (12.5 mg total) by mouth 2 (two) times daily.   Quantity: 90 tablet  Refills: 3     furosemide 80 MG Tabs  Commonly known as: Lasix      Take 1 tablet (80 mg total) by mouth 2 (two) times daily.   Quantity: 180 tablet  Refills: 3     glipiZIDE 10 MG Tabs  Commonly known as: Glucotrol      Take 1 tablet (10 mg total) by mouth 2 (two) times daily before meals.   Quantity: 180 tablet  Refills: 3     midodrine 5 MG Tabs  Commonly known as: ProAmatine      TAKE 1 TABLET BY MOUTH 1 HOUR BEFORE THE END OF DIALYSIS   Quantity: 90 tablet  Refills: 1     Nephro-Nathaly 0.8 MG Tabs      TAKE 1 TABLET BY MOUTH EVERY DAY   Quantity: 90 tablet  Refills: 0      sevelamer carbonate 800 MG Tabs  Commonly known as: Renvela      Take 1 tablet (800 mg total) by mouth 3 (three) times daily.   Refills: 0            STOP taking these medications      aspirin 81 MG Tbec        ketorolac 0.5 % Soln  Commonly known as: Acular        metoclopramide 10 MG Tabs  Commonly known as: Reglan                  Where to Get Your Medications        These medications were sent to [place] [id number] [place] [facility] [place] - [address] [facility] [phone], [phone]  [address] [facility] [address]      Phone: [phone]   atorvastatin 40 MG Tabs  ciprofloxacin 0.3 % Soln  docusate sodium 100 MG Caps  HYDROcodone-acetaminophen  MG Tabs         CONSULTANTS  Consultants         Provider   Role Specialty     Reid Menard MD  Consulting Physician INFECTIOUS DISEASES     Pelon Gomes MD  Consulting Physician SURGERY, GENERAL     Modesto Ayala MD  Consulting Physician NEPHROLOGY            FOLLOW UP:  Riaz Jackson PA-C  [address] [facility] [address]  [address]  [place] [address]  [phone]    Follow up in 2 week(s)  For wound re-check    Jaycob Aleman MD  [address] [place] [address]  [phone]    Schedule an appointment as soon as possible for a visit in 7 day(s)      Fernanda Escobedo APRN  [address] [facility]  [address]  [place] [address]  [phone]    Follow up in 1 week(s)  To follow-up on repeat ultrasound results    The above plan and follow-up instructions were reviewed with the patient and they verbalized understanding and agreement.  They understand to return to the emergency room for any concerning signs or symptoms.  Greater than 30 minutes spent on discharge.  -----------------------    Hospital Discharge Diagnoses:  Status post ventral hernia repair 1/18    Lace+ Score: 25  59-90 High Risk  29-58 Medium Risk  0-28   Low Risk.    TCM Follow-Up Recommendation:  LACE < 29: Low Risk of readmission after discharge from the hospital. No TCM follow-up needed.

## 2024-01-24 NOTE — TELEPHONE ENCOUNTER
Appointment Information   Name: Shane Hawthorne MRN: [id number]   Date: 1/30/2024 Status: Dwight   Appt Time: 5:15 PM Length: 30   Visit Type: [facility] HOSPITAL FOLLOW UP [6010] Copay: $30.00   Provider: Jaycob Aleman MD Department: [facility]   Referral Number:   Referral Status:     Enc Form Number: [id number]       Notes: TCM--ok per PCP, surgery follow up and review ultrasound results   Made On:  Changed:  Changed: 1/24/2024 9:39 AM  1/24/2024 4:07 PM  1/24/2024 4:31 PM By:  By:  By: BLAZE SIFUENTES [[id number]] (ES)  JOHN FROST [[id number]] (ES)  CHUY VALDOVINOS [[id number]] (ES)   Changes:     1/24/2024 4:07 PM    Old Appointment: New Appointment:   Visit Type: FOLLOW UP VISIT [2828]  Provider: Jaycob Aleman MD [[id number]]  Dept: [facility] [[id number]]  Date: 1/30/24  Arrival Time: 5:30 PM  Time: 5:30 PM  Length: 15 Visit Type: FOLLOW UP VISIT [2828]  Provider: Jaycob Aleman MD [[id number]]  Dept: [facility] [[id number]]  Date: 1/30/24  Arrival Time: 5:15 PM  Time: 5:15 PM  Length: 30   1/24/2024 4:31 PM    Old Appointment: New Appointment:   Visit Type: FOLLOW UP VISIT [2828]  Provider: Jaycob Aleman MD [[id number]]  Dept: [facility] [[id number]]  Date: 1/30/24  Arrival Time: 5:15 PM  Time: 5:15 PM  Length: 30 Visit Type: [facility] HOSPITAL FOLLOW UP [6010]  Provider: Jaycob Aleman MD [[id number]]  Dept: [facility] [[id number]]  Date: 1/30/24  Arrival Time: 5:15 PM  Time: 5:15 PM  Length: 30

## 2024-01-24 NOTE — ED INITIAL ASSESSMENT (HPI)
Pt presents to ed from dialysis. Pt states he came from dialysis because \"the blood pressure cuff from the dialysis center squeezes too hard and I did not want them to take my blood pressure so they sent me here\". Report not getting dialysis today.    Pt states he last had dialysis Monday.

## 2024-01-24 NOTE — TELEPHONE ENCOUNTER
Spoke with patient for TCM today.  Patient agrees to TCM appointment with Dr. Aleman, but requests appt after 4:30 p.m. This NCM unable to book as requested, d/t schedule limitations.    Per guidelines patient should  be seen within seven days by 1/30/2024.     Otherwise, last date for TCM: 2/06/2024     Please discuss with PCP if 5:15 p.m. res 24 on 1/30/2024 with 5:30 p.m. open appt slot can be used for 30 minute TCM appt and follow-up with patient, accordingly. Pt goes to dialysis M-W-F. Thank you!    Future Appointments   Date Time Provider Department Center   1/29/2024  7:30 AM [facility] US RM4 VAS [facility] US EM [place]   2/5/2024  2:00 PM Riaz Jackson PA-C [id number]   3/5/2024  4:30 PM [facility] SLEEP ROOMS [facility] SLEEP  Sleep Ctr         Follow up appointments:    Follow-up Information     Follow up With Specialties Details Why Contact Info   Riaz Jackson PA-C Physician Assistant, SURGERY, GENERAL Follow up in 2 week(s) For wound re-check [address]  [address]  [facility] [id number] [phone]   Jaycob Aleman MD Internal Medicine Schedule an appointment as soon as possible for a visit in 7 day(s)   [address] [facility] [id number] [phone]   Fernanda Escobedo APRN Nurse Practitioner, INTERVENTIONAL, RADIOLOGY Follow up in 1 week(s) To follow-up on repeat ultrasound results [address]  [address]  [place] [id number] [phone]   US VENOUS DOPPLER ARM LEFT - DIAG IMG (CPT=93971)  Complete by: 01/29/24 (Approximate)

## 2024-01-24 NOTE — PROGRESS NOTES
Left message on mailbox for pt to call [facility] back for TCM.  [facility] contact information [phone] included in message.        Discharge Dx:   Incarcerated ventral hernia measuring 6 cm.  1/18/2024 PROCEDURE:  Laparoscopic hybrid repair of ventral hernia with mesh, lysis of adhesions.  Intra-abdominal bleeding,  1/18/2024 post-op Procedure Performed:   Diagnostic laparoscopy, evacuation of intra-abdominal bleeding 1500 cc    Follow up appointments:    Follow-up Information    Follow up With Specialties Details Why Contact Info   Riaz Jackson PA-C Physician Assistant, SURGERY, GENERAL Follow up in 2 week(s) For wound re-check [address]  [facility] [address]  [place] [id number] [phone]   Jaycob Aleman MD Internal Medicine Schedule an appointment as soon as possible for a visit in 7 day(s)  [address] [facility] [id number] [phone]   Fernanda Escobedo APRN Nurse Practitioner, INTERVENTIONAL, RADIOLOGY Follow up in 1 week(s) To follow-up on repeat ultrasound results [address]  [address]  [place] [address]  [phone]   US VENOUS DOPPLER ARM LEFT - DIAG IMG (CPT=93971)  Complete by: 01/29/24 (Approximate)

## 2024-01-24 NOTE — PROGRESS NOTES
Initial Post Discharge Follow Up   Discharge Date: 1/23/24  Contact Date: 1/24/2024    Consent Verification:  Assessment Completed With: Patient  HIPAA Verified?  Yes    Discharge Dx:   Incarcerated ventral hernia measuring 6 cm.  1/18/2024 PROCEDURE:  Laparoscopic hybrid repair of ventral hernia with mesh, lysis of adhesions.  Intra-abdominal bleeding,  1/18/2024 post-op Procedure Performed:   Diagnostic laparoscopy, evacuation of intra-abdominal bleeding 1500 cc    General:   How have you been since your discharge from the hospital? Pt feeling better, since hospital discharge--incisions healing without redness, drainage or bleeding, intermittent pain 2/10, wearing abdominal binder for comfort--pt does have Tylenol and Norco at home, but has not yet needed to take. Pt appetite adequate, voiding without difficulty, had BM this morning. Patient denies fever, chills, nausea, vomiting, diarrhea, calf pain or swelling, intractable abdominal pain, chest pain or shortness of breath at this time.  Do you have any pain since discharge?  Yes  Where: incisional   Rating on pain scale 1-10, 10 being the worst pain you have ever experienced, what is current pain: 2  Alleviating factors: rest, abdominal binder, Tylenol and Hydrocodone, as needed  Aggravating factors: positional  Is the pain manageable at home? Yes  How well was your pain managed while in the hospital?   On a scale of 1-5   1- Very Poor and 5- Very well   Very Well  When you were leaving the hospital were your discharge instructions reviewed with you? Yes  How well were your discharge instructions explained to you?   On a scale of 1-5   1- Very Poor and 5- Very well   Very Well  Do you have any questions about your discharge instructions?  Yes  Before leaving the hospital was your diagnoses explained to you? Yes  Do you have any questions about your diagnoses? No  Are you able to perform normal daily activities of living as you have prior to your hospital stay  (dressing, bathing, ambulating to the bathroom, etc)? yes  (NCM) Was patient given a different diet per AVS? yes  If so, which diet?  Post-op diet:  Don't push yourself to eat. Your body will tell you when to eat and how much.  Start off with clear liquids and soup. They're easier to digest.  Next try semi-solid foods as you feel ready. These include mashed potatoes, applesauce, and gelatin.  Slowly move to solid foods. Don’t eat fatty, rich, or spicy foods at first.  Don't force yourself to have 3 large meals a day. Instead eat smaller amounts more often.  Take pain medicines with a small amount of solid food, such as crackers or toast. This helps prevent nausea.  Are there any barriers to following this diet? no    Medications:   Current Outpatient Medications   Medication Sig Dispense Refill    ciprofloxacin 0.3 % Ophthalmic Solution Place 1 drop into the right eye every 4 (four) hours while awake for 2 days. 2.5 mL 0    acetaminophen 325 MG Oral Tab Take 2 tablets (650 mg total) by mouth every 4 (four) hours as needed.      atorvastatin 40 MG Oral Tab Take 1 tablet (40 mg total) by mouth nightly. 90 tablet 3    HYDROcodone-acetaminophen (NORCO)  MG Oral Tab Take 1 tablet by mouth every 4 (four) hours as needed for Pain. 30 tablet 0    docusate sodium 100 MG Oral Cap Take 1 capsule (100 mg total) by mouth 2 (two) times daily. 30 capsule 0    NEPHRO-CARLI 0.8 MG Oral Tab TAKE 1 TABLET BY MOUTH EVERY DAY 90 tablet 0    carvedilol 12.5 MG Oral Tab Take 1 tablet (12.5 mg total) by mouth 2 (two) times daily. 90 tablet 3    furosemide 80 MG Oral Tab Take 1 tablet (80 mg total) by mouth 2 (two) times daily. 180 tablet 3    albuterol (2.5 MG/3ML) 0.083% Inhalation Nebu Soln Take 3 mL (2.5 mg total) by nebulization every 4 (four) hours as needed for Wheezing or Shortness of Breath. 75 mL 0    glipiZIDE 10 MG Oral Tab Take 1 tablet (10 mg total) by mouth 2 (two) times daily before meals. 180 tablet 3    MIDODRINE 5 MG  Oral Tab TAKE 1 TABLET BY MOUTH 1 HOUR BEFORE THE END OF DIALYSIS 90 tablet 1    ALBUTEROL 108 (90 Base) MCG/ACT Inhalation Aero Soln INHALE 2 PUFFS INTO THE LUNGS EVERY 6 HOURS AS NEEDED FOR WHEEZING FOR UP TO 30 DAYS. 8.5 each 3    Sevelamer 800 MG Oral Tab Take 1 tablet (800 mg total) by mouth 3 (three) times daily.       Were there any changes to your current medication(s) noted on the AVS? Yes  START taking:  acetaminophen (Tylenol)  ciprofloxacin (Ciloxan)  docusate sodium (Colace)  HYDROcodone-acetaminophen (Norco)  STOP taking:  aspirin 81 MG Tbec  ketorolac 0.5 % Soln (Acular)  metoclopramide 10 MG Tabs (Reglan)  If so, were these medication changes discussed with you prior to leaving the hospital? Yes  If a new medication was prescribed:    Was the new medication's purpose & side effects reviewed? Yes  Do you have any questions about your new medication? No  Did you  your discharge medications when you left the hospital? Yes  Let's go over your medications together to make sure we are not missing anything. Medications Reviewed  Are there any reasons that keep you from taking your medication as prescribed? No  Are you having any concerns with constipation? No  Did patient receive their flu shot (Sept-March)? No    Discharge medications reviewed/discussed/and reconciled against outpatient medications with patient.  Any changes or updates to medications sent to PCP.  Patient Acknowledged     Referrals/orders at D/C:  Referrals/orders placed at D/C? no  DME ordered at D/C? Yes  What? Abdominal binder, IS  From where? hospital  Have you received your (DME)? yes    Discharge orders, AVS reviewed and discussed with patient. Any changes or updates to orders sent to PCP.  Patient Acknowledged      SDOH:   Transportation:    Transportation Needs: No Transportation Needs (1/24/2024)    Transportation Needs     Lack of Transportation: No     Financial Strain:    Financial Resource Strain: Low Risk  (1/24/2024)     Financial Resource Strain     Difficulty of Paying Living Expenses: Not very hard     Med Affordability: No        Follow up appointments:    Follow-up Information     Follow up With Specialties Details Why Contact Info   Riaz Jackson PA-C Physician Assistant, SURGERY, GENERAL Follow up in 2 week(s) For wound re-check [address]  [address]  [facility] [id number] [phone]   Jaycob Aleman MD Internal Medicine Schedule an appointment as soon as possible for a visit in 7 day(s)   [address] [facility] [id number] [phone]   Fernanda Escobedo APRN Nurse Practitioner, INTERVENTIONAL, RADIOLOGY Follow up in 1 week(s) To follow-up on repeat ultrasound results [address]  [address]  [facility] [id number] [phone]   US VENOUS DOPPLER ARM LEFT - DIAG IMG (CPT=93971)  Complete by: 01/29/24 (Approximate)  Your appointments       Date & Time Appointment Department (Center)    Mar 05, 2024  4:30 PM [facility] Home Sleep Apnea with [facility] SLEEP ROOMS [facility] ([facility])              [facility]  [facility]  [address] [place] [id number] [phone]            TCC  Was TCC ordered: No    PCP (If no TCC appointment)  Does patient already have a PCP appointment scheduled? No  NCM Attempted to schedule PCP office TCM appointment with patient   If no appointment scheduled: Explain: Patient agrees to TCM appointment with Dr. Aleman, but requests appt after 4:30 p.m. --unable to book as requested, d/t schedule limitations.    Specialist    Does the patient have any other follow up appointment(s) needing to be scheduled? Yes  If yes: NCM reviewed upcoming specialist appointment with patient: Yes  Does the patient need assistance scheduling appointment(s): No--transferred pt to gen sx ext--pt awaiting return call     Is there any reason as to why you cannot make your appointment(s)?  No     Needs post D/C:   Now that you are  home, are there any needs or concerns you need addressed before your next visit with your PCP?  (DME, meds, questions, etc.): No    Interventions by NCM:   Discussed diet, activity, medications and need for f/u visits. Pt did not take home IS from hospital--relayed to pt to take 10 slow, deep breaths every hour, while awake--patient verbalizes understanding and agreement. Pt aware to schedule f/u with DENIS Jackson and US left arm, as directed. Patient agrees to TCM appointment with Dr. Aleman, but requests appt after 4:30 p.m. This NCM unable to book as requested, d/t schedule limitations--sent TE to office staff for appt clarification and f/u.  Patient aware when to contact PCP/specialists and when to seek emergency care. No further questions/concerns at this time. Transferred pt to general surgery ext x [phone] for f/u appt.    Overall Rating:   How would you rate the care you received while in the hospital? good    CCM referral placed:    Not Applicable    BOOK BY DATE: 1/30/2024

## 2024-01-25 ENCOUNTER — TELEPHONE (OUTPATIENT)
Dept: SURGERY | Facility: CLINIC | Age: 54
End: 2024-01-25

## 2024-01-25 NOTE — ED PROVIDER NOTES
Patient Seen in: [facility] Emergency Department      History     Chief Complaint   Patient presents with    Dialysis     Stated Complaint: Sol sent him here for dialysis bc he refused BP cuff during treatment    Subjective:   HPI        Objective:   Past Medical History:   Diagnosis Date    Allergic rhinitis 1/1/2000    Asthma     Atherosclerosis of coronary artery     Cataract     Colon polyps     Diabetes (HCC) 7/1/1995    Diabetes mellitus, type II (HCC)     End stage renal disease on dialysis ([facility]) 04/2022    Essential hypertension     Hernia, abdominal     High blood pressure     High cholesterol     Hyperlipidemia     Hypertension     Macular edema     multiple Eylea injections- Dr. Marquis    Neuropathy     Obesity 1/1/1984    Renal disorder               Past Surgical History:   Procedure Laterality Date    ABDOMINAL SURGERY  01/18/2024    Diagnostic laparoscopy, evacuation of intra-abdominal bleeding, placement of Peter, Sincere-Carrillo placement.    APPENDECTOMY  1974    CATARACT  11/1/2022    CATH LEFT HEART CATHETERIZATION  08/09/2022    LHC, IFR LAD, stent PCI LAD, stent PCI first diagonal    COLONOSCOPY  11/30/2021    COLONOSCOPY  2022    EYE SURGERY Bilateral     HAND/FINGER SURGERY UNLISTED Right 2018    ORIF    IR TUNNELED DIALYSIS CATHETER  04/01/2022    TOE SURGERY Right     great toe    TONSILLECTOMY  1978                Social History     Socioeconomic History    Marital status:     Number of children: 3   Occupational History    Occupation: [employer]    Tobacco Use    Smoking status: Former    Smokeless tobacco: Never    Tobacco comments:     Quit due to asthma aprox 25 years ago   Vaping Use    Vaping Use: Never used   Substance and Sexual Activity    Alcohol use: Not Currently     Alcohol/week: 2.0 standard drinks of alcohol     Types: 2 Cans of beer per week    Drug use: Yes     Frequency: 7.0 times per week     Types: Cannabis     Comment: Smokes  marijuana to stimulate appetite     Social Determinants of Health     Financial Resource Strain: Low Risk  (1/24/2024)    Financial Resource Strain     Difficulty of Paying Living Expenses: Not very hard     Med Affordability: No   Food Insecurity: No Food Insecurity (1/22/2024)    Food Insecurity     Food Insecurity: Never true   Transportation Needs: No Transportation Needs (1/24/2024)    Transportation Needs     Lack of Transportation: No   Housing Stability: Low Risk  (1/22/2024)    Housing Stability     Housing Instability: No              Review of Systems    Positive for stated complaint: [facility] sent him here for dialysis bc he refused BP cuff during treatment  Other systems are as noted in HPI.  Constitutional and vital signs reviewed.      All other systems reviewed and negative except as noted above.    Physical Exam     ED Triage Vitals [01/24/24 1649]   BP (!) 165/88   Pulse 98   Resp 18   Temp 98.2 °F (36.8 °C)   Temp src Oral   SpO2 97 %   O2 Device None (Room air)       Current:BP (!) 165/88   Pulse 98   Temp 98.2 °F (36.8 °C) (Oral)   Resp 18   SpO2 97%         Physical Exam          ED Course   Labs Reviewed - No data to display                   MDM      53-year-old male with a history of hypertension, diabetes, end-stage renal disease on dialysis, and who was just discharged yesterday after admission for repair of an abdominal hernia complicated by hemorrhage presents today for hemodialysis.  The patient last had hemodialysis during admission 2 days ago.  He presented to the outpatient dialysis center today.  There, he was complaining of pain from there blood pressure cuff and would not proceed with the dialysis unless they did a manual cuff.      He reports that the nurse in charge called the nephrologist and then told the patient to go to the ER for dialysis.      I called the nephrology team who spoke with the dialysis team.  They stated that the patient was arguing with the staff about  the blood pressure cuff and then abruptly left and was not referred to the ER.  There are no medical concerns and he does not need immediate admission.    on exam, patient well-appearing and without new complaints.  Slightly hypertensive.  No respiratory distress.  No significant edema.    Differential: End-stage renal disease in need of dialysis    For nephrology team was able to make an appointment for him tomorrow at 10 AM.  Patient was informed of this and discharged in stable condition.                                   MDM    Disposition and Plan     Clinical Impression:  1. Need for acute hemodialysis (HCC)         Disposition:  Discharge  1/24/2024  6:26 pm    Follow-up:  [facility]  [address] [address]  Follow up            Medications Prescribed:  Current Discharge Medication List

## 2024-01-25 NOTE — TELEPHONE ENCOUNTER
Instructed patient to add Tylenol to his medication usage.  Also advised a binder and applying ice to the site.  Patient agreed.

## 2024-01-25 NOTE — TELEPHONE ENCOUNTER
Per pt the norco is not helping with the pain, states he has already taken 14 since 1/23. Please advise

## 2024-01-25 NOTE — TELEPHONE ENCOUNTER
Per pt calling again, pt is having a lot of pain, unable to reach RN. Per pt asking to call twice because phone will not ring first time. Thank you.

## 2024-01-29 ENCOUNTER — HOSPITAL ENCOUNTER (OUTPATIENT)
Dept: ULTRASOUND IMAGING | Facility: HOSPITAL | Age: 54
Discharge: HOME | End: 2024-01-29
Attending: HOSPITALIST
Payer: COMMERCIAL

## 2024-01-29 ENCOUNTER — TELEPHONE (OUTPATIENT)
Dept: INTERNAL MEDICINE UNIT | Facility: HOSPITAL | Age: 54
End: 2024-01-29

## 2024-01-29 ENCOUNTER — TELEPHONE (OUTPATIENT)
Dept: HEMATOLOGY/ONCOLOGY | Facility: HOSPITAL | Age: 54
End: 2024-01-29

## 2024-01-29 DIAGNOSIS — I82.90 THROMBUS: ICD-10-CM

## 2024-01-29 PROCEDURE — 93971 EXTREMITY STUDY: CPT | Performed by: HOSPITALIST

## 2024-01-29 NOTE — TELEPHONE ENCOUNTER
Message received by hospitalist MD Dr DIOGENES Harvey whom reviewed patients repeat US results from today. MD discussed with IR team and ordered for PICC to be removed.     Call made to infusion center and order faxed. Quynh from Infusion center to f/u with patient to schedule PICC removal.

## 2024-01-29 NOTE — TELEPHONE ENCOUNTER
Received order for patient to have PICC line removed - called patient and no answer.    Infusion scheduling: please follow up with patient tomorrow to schedule.

## 2024-01-30 NOTE — PROGRESS NOTES
Repeat ultrasound results noted.  I discussed the case with interventional radiology APN, case was discussed with  who recommended removal of the PICC line.  This will be done through the infusion center as an outpatient.

## 2024-01-31 ENCOUNTER — APPOINTMENT (OUTPATIENT)
Dept: HEMATOLOGY/ONCOLOGY | Facility: HOSPITAL | Age: 54
End: 2024-01-31
Attending: HOSPITALIST
Payer: COMMERCIAL

## 2024-01-31 PROCEDURE — 99211 OFF/OP EST MAY X REQ PHY/QHP: CPT

## 2024-02-05 ENCOUNTER — OFFICE VISIT (OUTPATIENT)
Dept: SURGERY | Facility: CLINIC | Age: 54
End: 2024-02-05
Payer: COMMERCIAL

## 2024-02-05 ENCOUNTER — TELEPHONE (OUTPATIENT)
Dept: NEPHROLOGY | Facility: CLINIC | Age: 54
End: 2024-02-05

## 2024-02-05 VITALS — WEIGHT: 213 LBS | BODY MASS INDEX: 31 KG/M2

## 2024-02-05 DIAGNOSIS — Z48.89 ENCOUNTER FOR POSTOPERATIVE CARE: Primary | ICD-10-CM

## 2024-02-05 NOTE — PROGRESS NOTES
S:  Shane Hawthorne is a 53 year old male sp laparoscopic ventral hernia repair and subsequent ex lap with hematoma evacuation. Feeling okay, reports tenderness around umbilicus, improving but not completely resolved. No nausea or vomiting, decreased appetite, +bms.      O:  Wt 213 lb (96.6 kg)   BMI 31.45 kg/m²   GEN:  No acute distress  L: nonlabored respirations  H: reg rate  Abd:  Soft, NT,ND.  Skin: Incision C D I, no eryth  Extr: No edema, no calf tenderness    Path:  Reviewed w pt    Assessment   1. Encounter for postoperative care        Doing well post op, continue to avoid heavy lifting for another few weeks.  Continue to keep incision clean and dry.  Maintain a healthy diet.  Maintain good hydration.  F/u prn.         Riaz Jackson PA-C

## 2024-02-05 NOTE — TELEPHONE ENCOUNTER
Pt dropped off paperwork for FMLA- for Dr Ayala    Pt was given  HIPAA form- will bring back- had another appt to get to     Emailed to PATRICK

## 2024-02-12 NOTE — TELEPHONE ENCOUNTER
Patient dropped off signed PATRICK. Emailed Forms Department. All paper work sent to Forms Dept via interoffice mail.

## 2024-02-19 NOTE — TELEPHONE ENCOUNTER
Pt called back, gathered the info below; PT WOULD LIKE UPLOADED UPON COMPLETION    Details have been tasked to MD for Approval, pt aware and expressed understanding     Type of Leave: Cont. FMLA & Intermittent   Reason for Leave: End Stage Renal issues/ SX hernia repair   Start date of leave: 01/18/2024-01/26/2024 w/ RTW on 1/29/24   How much time needed?:   NEEDS INTERMITTENT 1/29/2024-01/28/2025- 7-8 Flare ups a mth,  lasting 24-48 hrs each flare up     Appt's 3 x's a week lasting 4-5hrs each apt (dialysis appt)   Reg appt's 2x mth 1-4 hrs each appt.    Forms Due Date: ASAP Due on 02/22/2024  Was Fee and Turnaround info Given?: YES

## 2024-02-19 NOTE — TELEPHONE ENCOUNTER
Addis Ayala,     Pt is asking for the following;    Type of Leave: Cont. FMLA & Intermittent   Reason for Leave: End Stage Renal issues/ SX hernia repair   Start date of leave: 01/18/2024-01/26/2024 w/ RTW on 1/29/24   How much time needed?:   NEEDS INTERMITTENT 1/29/2024-01/28/2025- 7-8 Flare ups a mth,  lasting 24-48 hrs each flare up     Appt's 3 x's a week lasting 4-5hrs each apt (dialysis appt)   Reg appt's 2x mth 1-4 hrs each appt.    Do you support?

## 2024-02-21 NOTE — TELEPHONE ENCOUNTER
Dr. Ayala     *The ACKNOWLEDGE button has been moved to the top right ribbon*    Please sign off on form if you agree to: FMLA  (place your signature on the first page only)    -From your Inbasket, Highlight the patient and click Chart   -Double click the 02/05/2024 Forms Completion telephone encounter  -Scroll down to the Media section   -Click the blue Hyperlink: KEENA Ayala 02/21/2024  -Click Acknowledge located in the top right ribbon/menu   -Drag the mouse into the blank space of the document and a + sign will appear. Left click to   electronically sign the document.     Thank you,  Della GOODMAN

## 2024-02-26 NOTE — TELEPHONE ENCOUNTER
Returned call for status on forms - patient advised forms are waiting for signature- Patient is requesting for forms to be uploaded to [employer] once completed/signed. Per Patient forms are due ASAP and his job is requesting for them today.         2ND REQ FOR SIGNATURE! THANK YOU SO MUCH IN ADVANCE..     Dr. Ayala     *The ACKNOWLEDGE button has been moved to the top right ribbon*     Please sign off on form if you agree to: FMLA  (place your signature on the first page only)     -From your Inbasket, Highlight the patient and click Chart   -Double click the 02/05/2024 Forms Completion telephone encounter  -Scroll down to the Media section   -Click the blue Hyperlink: KEENA Ayala 02/21/2024  -Click Acknowledge located in the top right ribbon/menu   -Drag the mouse into the blank space of the document and a + sign will appear. Left click to   electronically sign the document.     Thank you,  Della GOODMAN

## 2024-02-26 NOTE — TELEPHONE ENCOUNTER
2ND REQ FOR SIGNATURE! THANK YOU SO MUCH IN ADVANCE..    Dr. Ayala     *The ACKNOWLEDGE button has been moved to the top right ribbon*     Please sign off on form if you agree to: FMLA  (place your signature on the first page only)     -From your Inbasket, Highlight the patient and click Chart   -Double click the 02/05/2024 Forms Completion telephone encounter  -Scroll down to the Media section   -Click the blue Hyperlink: KEENA Ayala 02/21/2024  -Click Acknowledge located in the top right ribbon/menu   -Drag the mouse into the blank space of the document and a + sign will appear. Left click to   electronically sign the document.     Thank you,  Della GOODMAN

## 2024-02-26 NOTE — TELEPHONE ENCOUNTER
Pt states he spoke to forms department and that [facility] paperwork was sent to the office for signature from Dr. Ayala which pt job continues to ask for please follow up and please call pt thanks

## 2024-02-26 NOTE — TELEPHONE ENCOUNTER
Pt stopped at desk regarding prior message-   Pt asking to get paperwork done ASAP -needs for work

## 2024-03-05 ENCOUNTER — OFFICE VISIT (OUTPATIENT)
Dept: SLEEP CENTER | Age: 54
End: 2024-03-05
Attending: INTERNAL MEDICINE
Payer: COMMERCIAL

## 2024-03-05 DIAGNOSIS — R06.83 SNORING: ICD-10-CM

## 2024-03-05 DIAGNOSIS — R06.81 APNEIC EPISODE: ICD-10-CM

## 2024-03-05 PROCEDURE — 95806 SLEEP STUDY UNATT&RESP EFFT: CPT

## 2024-03-07 NOTE — PROCEDURES
[facility]  Accredited by the American Academy of Sleep Medicine (AASM)    PATIENT'S NAME: AARON HOYT   ATTENDING PHYSICIAN: Jaycob Aleman MD   REFERRING PHYSICIAN: Jaycob Aleman MD   PATIENT ACCOUNT #: [id number] LOCATION: [facility]   MEDICAL RECORD #: [id number] YOB: 1970   DATE OF STUDY: 03/05/2024       SLEEP STUDY REPORT    STUDY TYPE:  Home sleep test.    INDICATION:  Suspected obstructive sleep apnea (ICD-10 code G47.33) in patient with witnessed apneic events, snoring, daytime somnolence, nocturnal awakenings, nocturia, coronary artery disease, end-stage renal disease, diabetes, hypertension, with an Mora Sleepiness Scale score of 4/24, and a body mass index 30.1.    RESULTS:  The patient underwent home sleep test with measurement of his nasal air flow, nasal air pressure, snoring, chest and abdominal wall motion, oximetry, and body position.  I have reviewed the entirety of the raw data of this study.  During this study, the total recording time is 313 minutes.  The lights-out clock time is 12:28 a.m., the lights-on clock time is 5:41 a.m.  The apnea plus hypopnea index is 43.5 events per hour.  The supine apnea plus hypopnea index is 60.5 events per hour.  The average oxygen saturation is 94%, the lowest oxygen saturation is 60%, and the patient spent 13% of the test with saturations 88% or less.  The average heart rate is 79 beats per minute, and the patient spent approximately 65% of the test in the supine position.    INTERPRETATION:  The data generated from this study is consistent with severe obstructive sleep apnea (ICD-10 code G47.33) which becomes very severe in the supine position.    RECOMMENDATIONS:    1.    CPAP titration.    2.   Weight loss.    3.   Avoid alcohol.    4.   Avoid sedating drug.    5.   Patient should not drive if at all sleepy.      Please do not hesitate to contact me if there is any question whatsoever regarding interpretation of this  study.    Dictated By Radames Clemons MD  d: 03/06/2024 20:28:32  t: 03/06/2024 22:29:50  Job [id number]  PJ/    cc: Jaycob Aleman MD

## 2024-03-10 DIAGNOSIS — G47.33 OBSTRUCTIVE SLEEP APNEA: Primary | ICD-10-CM

## 2024-03-16 ENCOUNTER — OFFICE VISIT (OUTPATIENT)
Dept: SLEEP CENTER | Age: 54
End: 2024-03-16
Attending: INTERNAL MEDICINE
Payer: COMMERCIAL

## 2024-03-16 DIAGNOSIS — G47.33 OSA (OBSTRUCTIVE SLEEP APNEA): Primary | ICD-10-CM

## 2024-03-16 PROCEDURE — 95811 POLYSOM 6/>YRS CPAP 4/> PARM: CPT

## 2024-03-19 ENCOUNTER — ORDER TRANSCRIPTION (OUTPATIENT)
Dept: SLEEP CENTER | Age: 54
End: 2024-03-19

## 2024-03-19 DIAGNOSIS — G47.33 OBSTRUCTIVE SLEEP APNEA (ADULT) (PEDIATRIC): Primary | ICD-10-CM

## 2024-03-27 ENCOUNTER — TELEPHONE (OUTPATIENT)
Dept: INTERNAL MEDICINE CLINIC | Facility: CLINIC | Age: 54
End: 2024-03-27

## 2024-03-27 NOTE — TELEPHONE ENCOUNTER
Mariaelena requesting for recent diagnostic sleep study and notes prior to diagnostic sleep study to be faxed.  They received DME order without documentations.  Informed Mariaelena will fax reports needed.    Telemedicine visit 1/10/24 right faxed.  Sleep study 3/19/24 right faxed.

## 2024-03-27 NOTE — TELEPHONE ENCOUNTER
[facility] called back and said she received what Mirela faxed, however, she needs the home sleep study. She has a date of 3/5/24. Rn faxed sleep study results dated 3/6/24- to [phone] via [employer].

## 2024-03-29 NOTE — TELEPHONE ENCOUNTER
Mariaelena would like to inform Dr. Aleman that CPAP order will be placed on hold for now because patient is getting a new insurance policy. Patient will contact [facility] when his new insurance policy is active to order CPAP. Routing as FYI.

## 2024-06-12 NOTE — PATIENT INSTRUCTIONS
1.  Get labs done ordered by surgeon.  2.  Have EKG faxed to surgeon that was done by cardiology.  3.  Speak with your nephrologist regarding medications leading into surgery.  4.  Stop aspirin per the recommendations of surgery.  5.  Speak with your nephrologist regarding if there is a modification to your dialysis sessions.   Pressure Channel 1 zeroed.

## 2024-08-05 RX ORDER — SEVELAMER CARBONATE 800 MG/1
1600 TABLET, FILM COATED ORAL
Qty: 540 TABLET | Refills: 3 | Status: SHIPPED | OUTPATIENT
Start: 2024-08-05

## 2024-10-03 RX ORDER — FOLIC ACID/VIT B COMPLEX AND C 0.8 MG
1 TABLET ORAL DAILY
Qty: 90 TABLET | Refills: 0 | Status: SHIPPED | OUTPATIENT
Start: 2024-10-03

## 2024-11-18 ENCOUNTER — TELEPHONE (OUTPATIENT)
Dept: NEPHROLOGY | Facility: CLINIC | Age: 54
End: 2024-11-18

## 2024-11-18 RX ORDER — LOSARTAN POTASSIUM 100 MG/1
100 TABLET ORAL DAILY
Qty: 90 TABLET | Refills: 1 | Status: SHIPPED | OUTPATIENT
Start: 2024-11-18

## 2024-11-22 ENCOUNTER — TELEPHONE (OUTPATIENT)
Dept: NEPHROLOGY | Facility: CLINIC | Age: 54
End: 2024-11-22

## 2024-11-22 RX ORDER — METOPROLOL SUCCINATE 100 MG/1
100 TABLET, EXTENDED RELEASE ORAL DAILY
Qty: 30 TABLET | Refills: 2 | Status: SHIPPED | OUTPATIENT
Start: 2024-11-22

## 2024-12-16 ENCOUNTER — TELEPHONE (OUTPATIENT)
Dept: NEPHROLOGY | Facility: CLINIC | Age: 54
End: 2024-12-16

## 2024-12-16 RX ORDER — TORSEMIDE 100 MG/1
TABLET ORAL
Qty: 30 TABLET | Refills: 1 | Status: SHIPPED | OUTPATIENT
Start: 2024-12-16

## 2025-01-01 ENCOUNTER — APPOINTMENT (OUTPATIENT)
Dept: CT IMAGING | Age: 55
End: 2025-01-01

## 2025-01-01 ENCOUNTER — HOSPITAL ENCOUNTER (EMERGENCY)
Age: 55
End: 2025-03-27

## 2025-01-01 ENCOUNTER — APPOINTMENT (OUTPATIENT)
Dept: GENERAL RADIOLOGY | Age: 55
End: 2025-01-01

## 2025-01-01 VITALS
WEIGHT: 246.69 LBS | HEART RATE: 68 BPM | OXYGEN SATURATION: 87 % | RESPIRATION RATE: 22 BRPM | TEMPERATURE: 95.2 F | DIASTOLIC BLOOD PRESSURE: 43 MMHG | SYSTOLIC BLOOD PRESSURE: 70 MMHG
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
## 2025-01-01 LAB
ABO + RH BLD: NORMAL
ALBUMIN SERPL-MCNC: 2.8 G/DL (ref 3.4–5)
ALBUMIN/GLOB SERPL: 0.7 {RATIO} (ref 1–2.4)
ALP SERPL-CCNC: 78 UNITS/L (ref 45–117)
ALT SERPL-CCNC: 134 UNITS/L
AMPHETAMINES UR QL SCN>500 NG/ML: NEGATIVE
AMYLASE SERPL-CCNC: 44 UNITS/L (ref 25–115)
ANION GAP SERPL CALC-SCNC: 11 MMOL/L (ref 7–19)
APPEARANCE UR: CLEAR
APTT PPP: 30 SEC (ref 22–32)
AST SERPL-CCNC: 168 UNITS/L
BACTERIA #/AREA URNS HPF: ABNORMAL /HPF
BARBITURATES UR QL SCN>200 NG/ML: NEGATIVE
BASOPHILS # BLD: 0 K/MCL (ref 0–0.3)
BASOPHILS NFR BLD: 0 %
BENZODIAZ UR QL SCN>200 NG/ML: NEGATIVE
BILIRUB SERPL-MCNC: 0.6 MG/DL (ref 0.2–1)
BILIRUB UR QL STRIP: NEGATIVE
BLD GP AB SCN SERPL QL GEL: NEGATIVE
BLOOD EXPIRATION DATE: NORMAL
BUN SERPL-MCNC: 73 MG/DL (ref 6–20)
BUN/CREAT SERPL: 8 (ref 7–25)
BZE UR QL SCN>150 NG/ML: NEGATIVE
CALCIUM SERPL-MCNC: 8.9 MG/DL (ref 8.4–10.2)
CANNABINOIDS UR QL SCN>50 NG/ML: POSITIVE
CHLORIDE SERPL-SCNC: 100 MMOL/L (ref 97–110)
CK SERPL-CCNC: 408 UNITS/L (ref 39–308)
CO2 SERPL-SCNC: 29 MMOL/L (ref 21–32)
COLOR UR: ABNORMAL
CREAT SERPL-MCNC: 9.69 MG/DL (ref 0.67–1.17)
CROSSMATCH RESULT: NORMAL
CROSSMATCH RESULT: NORMAL
DEPRECATED RDW RBC: 61.7 FL (ref 39–50)
DISPENSE STATUS: NORMAL
EGFRCR SERPLBLD CKD-EPI 2021: 6 ML/MIN/{1.73_M2}
EOSINOPHIL # BLD: 0.2 K/MCL (ref 0–0.5)
EOSINOPHIL NFR BLD: 4 %
ERYTHROCYTE [DISTWIDTH] IN BLOOD: 17.5 % (ref 11–15)
ETHANOL SERPL-MCNC: NORMAL MG/DL
FASTING DURATION TIME PATIENT: ABNORMAL H
FENTANYL UR QL SCN: NEGATIVE
GLOBULIN SER-MCNC: 3.8 G/DL (ref 2–4)
GLUCOSE SERPL-MCNC: 210 MG/DL (ref 70–99)
GLUCOSE UR STRIP-MCNC: 300 MG/DL
HCT VFR BLD CALC: 21.6 % (ref 39–51)
HGB BLD-MCNC: 6.6 G/DL (ref 13–17)
HGB UR QL STRIP: ABNORMAL
HYALINE CASTS #/AREA URNS LPF: ABNORMAL /LPF
IMM GRANULOCYTES # BLD AUTO: 0.1 K/MCL (ref 0–0.2)
IMM GRANULOCYTES # BLD: 1 %
INR PPP: 1.2
ISBT BLOOD TYPE: 5100
ISSUE DATE/TIME: NORMAL
KETONES UR STRIP-MCNC: NEGATIVE MG/DL
LEUKOCYTE ESTERASE UR QL STRIP: NEGATIVE
LIPASE SERPL-CCNC: 83 UNITS/L (ref 15–77)
LYMPHOCYTES # BLD: 0.5 K/MCL (ref 1–4.8)
LYMPHOCYTES NFR BLD: 9 %
MCH RBC QN AUTO: 30.6 PG (ref 26–34)
MCHC RBC AUTO-ENTMCNC: 30.6 G/DL (ref 32–36.5)
MCV RBC AUTO: 100 FL (ref 78–100)
MONOCYTES # BLD: 0.4 K/MCL (ref 0.3–0.9)
MONOCYTES NFR BLD: 7 %
NEUTROPHILS # BLD: 4.5 K/MCL (ref 1.8–7.7)
NEUTROPHILS NFR BLD: 79 %
NITRITE UR QL STRIP: NEGATIVE
NRBC BLD MANUAL-RTO: 0 /100 WBC
OPIATES UR QL SCN>300 NG/ML: NEGATIVE
PCP UR QL SCN>25 NG/ML: NEGATIVE
PH UR STRIP: 8 [PH] (ref 5–7)
PLATELET # BLD AUTO: 189 K/MCL (ref 140–450)
POTASSIUM SERPL-SCNC: 5.4 MMOL/L (ref 3.4–5.1)
PRODUCT CODE: NORMAL
PRODUCT DESCRIPTION: NORMAL
PRODUCT ID: NORMAL
PROT SERPL-MCNC: 6.6 G/DL (ref 6.4–8.2)
PROT UR STRIP-MCNC: 300 MG/DL
PROTHROMBIN TIME: 12.9 SEC (ref 9.7–11.8)
RAINBOW EXTRA TUBES HOLD SPECIMEN: NORMAL
RBC # BLD: 2.16 MIL/MCL (ref 4.5–5.9)
RBC #/AREA URNS HPF: ABNORMAL /HPF
SODIUM SERPL-SCNC: 135 MMOL/L (ref 135–145)
SP GR UR STRIP: 1.01 (ref 1–1.03)
SPERM URNS QL MICRO: PRESENT
SQUAMOUS #/AREA URNS HPF: ABNORMAL /HPF
TROPONIN I SERPL DL<=0.01 NG/ML-MCNC: 13 NG/L
TYPE AND SCREEN EXPIRATION DATE: NORMAL
UNIT BLOOD TYPE: NORMAL
UNIT NUMBER: NORMAL
UROBILINOGEN UR STRIP-MCNC: 0.2 MG/DL
WBC # BLD: 5.7 K/MCL (ref 4.2–11)
WBC #/AREA URNS HPF: ABNORMAL /HPF

## 2025-01-01 PROCEDURE — 36430 TRANSFUSION BLD/BLD COMPNT: CPT

## 2025-01-01 PROCEDURE — 71275 CT ANGIOGRAPHY CHEST: CPT

## 2025-01-01 PROCEDURE — 71045 X-RAY EXAM CHEST 1 VIEW: CPT

## 2025-01-01 PROCEDURE — 31500 INSERT EMERGENCY AIRWAY: CPT

## 2025-01-01 PROCEDURE — 90715 TDAP VACCINE 7 YRS/> IM: CPT

## 2025-01-01 PROCEDURE — 85730 THROMBOPLASTIN TIME PARTIAL: CPT

## 2025-01-01 PROCEDURE — 90471 IMMUNIZATION ADMIN: CPT

## 2025-01-01 PROCEDURE — 84484 ASSAY OF TROPONIN QUANT: CPT

## 2025-01-01 PROCEDURE — 86850 RBC ANTIBODY SCREEN: CPT | Performed by: THORACIC SURGERY (CARDIOTHORACIC VASCULAR SURGERY)

## 2025-01-01 PROCEDURE — 99292 CRITICAL CARE ADDL 30 MIN: CPT

## 2025-01-01 PROCEDURE — 10002801 HB RX 250 W/O HCPCS

## 2025-01-01 PROCEDURE — 99291 CRITICAL CARE FIRST HOUR: CPT

## 2025-01-01 PROCEDURE — 82077 ASSAY SPEC XCP UR&BREATH IA: CPT

## 2025-01-01 PROCEDURE — 10002800 HB RX 250 W HCPCS

## 2025-01-01 PROCEDURE — 96365 THER/PROPH/DIAG IV INF INIT: CPT

## 2025-01-01 PROCEDURE — 32551 INSERTION OF CHEST TUBE: CPT

## 2025-01-01 PROCEDURE — 81001 URINALYSIS AUTO W/SCOPE: CPT

## 2025-01-01 PROCEDURE — 82150 ASSAY OF AMYLASE: CPT

## 2025-01-01 PROCEDURE — 83690 ASSAY OF LIPASE: CPT

## 2025-01-01 PROCEDURE — 10004180 HB COUNTER-TRANSPORT

## 2025-01-01 PROCEDURE — 96375 TX/PRO/DX INJ NEW DRUG ADDON: CPT

## 2025-01-01 PROCEDURE — 74177 CT ABD & PELVIS W/CONTRAST: CPT

## 2025-01-01 PROCEDURE — 70450 CT HEAD/BRAIN W/O DYE: CPT

## 2025-01-01 PROCEDURE — 85610 PROTHROMBIN TIME: CPT

## 2025-01-01 PROCEDURE — G0390 TRAUMA RESPONS W/HOSP CRITI: HCPCS

## 2025-01-01 PROCEDURE — 80053 COMPREHEN METABOLIC PANEL: CPT

## 2025-01-01 PROCEDURE — 80307 DRUG TEST PRSMV CHEM ANLYZR: CPT

## 2025-01-01 PROCEDURE — 51702 INSERT TEMP BLADDER CATH: CPT

## 2025-01-01 PROCEDURE — 96376 TX/PRO/DX INJ SAME DRUG ADON: CPT

## 2025-01-01 PROCEDURE — P9016 RBC LEUKOCYTES REDUCED: HCPCS

## 2025-01-01 PROCEDURE — 36415 COLL VENOUS BLD VENIPUNCTURE: CPT | Performed by: EMERGENCY MEDICINE

## 2025-01-01 PROCEDURE — 10002805 HB CONTRAST AGENT

## 2025-01-01 PROCEDURE — 10002807 HB RX 258

## 2025-01-01 PROCEDURE — 82550 ASSAY OF CK (CPK): CPT

## 2025-01-01 PROCEDURE — 85025 COMPLETE CBC W/AUTO DIFF WBC: CPT

## 2025-01-01 PROCEDURE — 10002801 HB RX 250 W/O HCPCS: Performed by: EMERGENCY MEDICINE

## 2025-01-01 RX ORDER — ROCURONIUM BROMIDE 10 MG/ML
100 INJECTION, SOLUTION INTRAVENOUS ONCE
Status: COMPLETED | OUTPATIENT
Start: 2025-01-01 | End: 2025-01-01

## 2025-01-01 RX ORDER — NOREPINEPHRINE BITARTRATE/D5W 8 MG/250ML
0-80 PLASTIC BAG, INJECTION (ML) INTRAVENOUS CONTINUOUS
Status: DISCONTINUED | OUTPATIENT
Start: 2025-01-01 | End: 2025-01-01 | Stop reason: HOSPADM

## 2025-01-01 RX ORDER — CALCIUM CHLORIDE 100 MG/ML
INJECTION INTRAVENOUS; INTRAVENTRICULAR PRN
Status: COMPLETED | OUTPATIENT
Start: 2025-01-01 | End: 2025-01-01

## 2025-01-01 RX ORDER — INDOMETHACIN 25 MG/1
CAPSULE ORAL PRN
Status: COMPLETED | OUTPATIENT
Start: 2025-01-01 | End: 2025-01-01

## 2025-01-01 RX ADMIN — EPINEPHRINE 1 MG: 0.1 INJECTION INTRAVENOUS at 12:05

## 2025-01-01 RX ADMIN — EPINEPHRINE 1 MG: 0.1 INJECTION INTRAVENOUS at 12:54

## 2025-01-01 RX ADMIN — TETANUS TOXOID, REDUCED DIPHTHERIA TOXOID AND ACELLULAR PERTUSSIS VACCINE, ADSORBED 0.5 ML: 5; 2.5; 8; 8; 2.5 SUSPENSION INTRAMUSCULAR at 11:57

## 2025-01-01 RX ADMIN — EPINEPHRINE 1 MG: 0.1 INJECTION INTRAVENOUS at 12:13

## 2025-01-01 RX ADMIN — CALCIUM CHLORIDE 1 G: 100 INJECTION INTRAVENOUS; INTRAVENTRICULAR at 12:05

## 2025-01-01 RX ADMIN — EPINEPHRINE 1 MG: 0.1 INJECTION INTRAVENOUS at 12:07

## 2025-01-01 RX ADMIN — SODIUM CHLORIDE 1000 ML: 9 INJECTION, SOLUTION INTRAVENOUS at 12:40

## 2025-01-01 RX ADMIN — EPINEPHRINE 1 MG: 0.1 INJECTION INTRAVENOUS at 12:10

## 2025-01-01 RX ADMIN — WATER 2000 MG: 1 INJECTION INTRAMUSCULAR; INTRAVENOUS; SUBCUTANEOUS at 11:56

## 2025-01-01 RX ADMIN — ROCURONIUM BROMIDE 100 MG: 10 INJECTION INTRAVENOUS at 12:03

## 2025-01-01 RX ADMIN — EPINEPHRINE 1 MG: 0.1 INJECTION INTRAVENOUS at 12:39

## 2025-01-01 RX ADMIN — EPINEPHRINE 1 MG: 0.1 INJECTION INTRAVENOUS at 12:33

## 2025-01-01 RX ADMIN — IOHEXOL 100 ML: 350 INJECTION, SOLUTION INTRAVENOUS at 13:27

## 2025-01-01 RX ADMIN — EPINEPHRINE 1 MG: 0.1 INJECTION INTRAVENOUS at 12:16

## 2025-01-01 RX ADMIN — SODIUM BICARBONATE 50 MEQ: 84 INJECTION, SOLUTION INTRAVENOUS at 12:53

## 2025-01-01 RX ADMIN — EPINEPHRINE 1 MG: 0.1 INJECTION INTRAVENOUS at 12:04

## 2025-01-01 RX ADMIN — FENTANYL CITRATE 50 MCG: 50 INJECTION INTRAMUSCULAR; INTRAVENOUS at 11:57

## 2025-01-01 RX ADMIN — NOREPINEPHRINE BITARTRATE 10 MCG/MIN: 8 INJECTION, SOLUTION INTRAVENOUS at 12:23

## 2025-01-01 RX ADMIN — EPINEPHRINE 1 MG: 0.1 INJECTION INTRAVENOUS at 12:36

## 2025-01-01 RX ADMIN — SODIUM BICARBONATE 50 MEQ: 84 INJECTION, SOLUTION INTRAVENOUS at 12:09

## 2025-01-16 ENCOUNTER — TELEPHONE (OUTPATIENT)
Dept: NEPHROLOGY | Facility: CLINIC | Age: 55
End: 2025-01-16

## 2025-01-16 RX ORDER — LOSARTAN POTASSIUM 100 MG/1
100 TABLET ORAL DAILY
Qty: 90 TABLET | Refills: 3 | Status: SHIPPED | OUTPATIENT
Start: 2025-01-16

## 2025-02-11 ENCOUNTER — TELEPHONE (OUTPATIENT)
Dept: NEPHROLOGY | Facility: CLINIC | Age: 55
End: 2025-02-11

## 2025-02-11 NOTE — TELEPHONE ENCOUNTER
Please contact our GI clinic if you wish to schedule Ultrasound with doppler and Fibroscan at [phone]. Or, you can send a message via [employer].   Signed.

## 2025-02-18 NOTE — TELEPHONE ENCOUNTER
Received Family Medical Leave Act forms via email from office, valid Authorization statement on form, logged for processing.

## 2025-02-21 NOTE — TELEPHONE ENCOUNTER
Cld patient Left message to call back to gather details on Family Medical Leave Act forms received

## 2025-02-26 ENCOUNTER — TELEPHONE (OUTPATIENT)
Dept: NEPHROLOGY | Facility: CLINIC | Age: 55
End: 2025-02-26

## 2025-02-26 RX ORDER — LOSARTAN POTASSIUM 100 MG/1
100 TABLET ORAL DAILY
Qty: 90 TABLET | Refills: 3 | Status: SHIPPED | OUTPATIENT
Start: 2025-02-26

## 2025-03-03 NOTE — TELEPHONE ENCOUNTER
Please review refill protocol failed/ no protocol  Requested Prescriptions   Pending Prescriptions Disp Refills    GLIPIZIDE 10 MG Oral Tab [Pharmacy Med Name: GLIPIZIDE 10 MG TABLET] 180 tablet 0     Sig: TAKE 1 TABLET BY MOUTH 2 TIMES DAILY BEFORE MEALS.        Diabetes Medication Protocol Failed - 10/12/2023  1:02 AM        Failed - EGFRCR or GFRNAA > 50     GFR Evaluation  EGFRCR: 12 , resulted on 7/13/2023          Passed - Last A1C < 7.5 and within past 6 months     Lab Results   Component Value Date    A1C 7.0 (H) 07/13/2023             Passed - In person appointment or virtual visit in the past 6 mos or appointment in next 3 mos     Recent Outpatient Visits              3 months ago Annual physical exam    [address] [facility] [place] Akira Cates MD    Office Visit    8 months ago [facility] right lower extremity, sequela    [facility]    [facility]    10 months ago [facility]    [facility]    10 months ago Influenza    [facility]    [facility]    11 months ago Ventral hernia with obstruction and without gangrene    [address] [facility] [place] Gallardo MD    Office Visit          Future Appointments         Provider Department Appt Notes    [facility] [address] [place] [address] 6M fu                      Passed - GFR in the past 12 months
DOS

## 2025-03-04 NOTE — TELEPHONE ENCOUNTER
Patient called back to provide details.    Type of Leave: Family Medical Leave Act intermittent    Reason for Leave: End stage renal disease   Start date of leave: 03/24/25   End date of leave: 03/23/26  How many flare ups per month/length?: 1-8 times per month lasting 1-2 days  How many appts per month/length?: 12 appointments per month lasting 1-8 hours   Was Fee and Turnaround info Given?: Yes

## 2025-03-05 ENCOUNTER — TELEPHONE (OUTPATIENT)
Dept: NEPHROLOGY | Facility: CLINIC | Age: 55
End: 2025-03-05

## 2025-03-05 RX ORDER — HYDRALAZINE HYDROCHLORIDE 50 MG/1
50 TABLET, FILM COATED ORAL 2 TIMES DAILY
Qty: 60 TABLET | Refills: 3 | Status: SHIPPED | OUTPATIENT
Start: 2025-03-05

## 2025-03-06 NOTE — TELEPHONE ENCOUNTER
Patient called to check the status of Family Medical Leave Act forms. Advised forms has not been processed yet and informed patient of forms department current processing time.

## 2025-03-06 NOTE — TELEPHONE ENCOUNTER
Dr. Ayala,       Please sign off on form if you agree to: Family Medical Leave Act recertification 03/24/2025-03/23/2026  (place your signature on the first page only)    -From your Inbasket, Highlight the patient and click Chart   -Double click the 02/11/2025 Forms Completion telephone encounter  -Scroll down to the Media section   -Click the blue Hyperlink: Family Medical Leave Act recertification Dr. Modesto Ayala 03/06/2025  -Click Acknowledge located in the top right ribbon/menu   -Drag the mouse into the blank space of the document and a + sign will appear. Left click to   electronically sign the document.     Thank you,  Della GOODMAN

## 2025-03-10 ENCOUNTER — TELEPHONE (OUTPATIENT)
Dept: INTERNAL MEDICINE CLINIC | Facility: CLINIC | Age: 55
End: 2025-03-10

## 2025-03-11 NOTE — TELEPHONE ENCOUNTER
Patient called checking on status, informed patient of provider response. Patient understood. All questions answered.

## 2025-03-12 NOTE — TELEPHONE ENCOUNTER
Patient aware forms need to be completed by provider outside of [facility]. Archiving forms.

## 2025-03-17 ENCOUNTER — TELEPHONE (OUTPATIENT)
Dept: NEPHROLOGY | Facility: CLINIC | Age: 55
End: 2025-03-17

## 2025-03-17 NOTE — TELEPHONE ENCOUNTER
Dr Lucy Lea  patient went to ER on 3/14/25  at [place]  notes updated in EPIC stayed overnight wasgiven transfusion.

## 2025-04-15 ENCOUNTER — TELEPHONE (OUTPATIENT)
Dept: INTERNAL MEDICINE CLINIC | Facility: CLINIC | Age: 55
End: 2025-04-15

## 2025-05-13 ENCOUNTER — TELEPHONE (OUTPATIENT)
Dept: INTERNAL MEDICINE CLINIC | Facility: CLINIC | Age: 55
End: 2025-05-13

## 2025-05-13 RX ORDER — ATORVASTATIN CALCIUM 40 MG/1
40 TABLET, FILM COATED ORAL NIGHTLY
Qty: 90 TABLET | Refills: 0 | Status: CANCELLED | OUTPATIENT
Start: 2025-05-13

## 2025-06-06 ENCOUNTER — TELEPHONE (OUTPATIENT)
Dept: INTERNAL MEDICINE CLINIC | Facility: CLINIC | Age: 55
End: 2025-06-06

## 2025-07-22 ENCOUNTER — TELEPHONE (OUTPATIENT)
Dept: INTERNAL MEDICINE CLINIC | Facility: CLINIC | Age: 55
End: 2025-07-22

## (undated) DEVICE — DRAIN SURG W7MMXL20CM SIL HUBLESS FLAT FLL

## (undated) DEVICE — SUTURE PERMAHAND SZ 3-0 L30IN NONABSORBABLE .

## (undated) DEVICE — [HIGH FLOW INSUFFLATOR,  DO NOT USE IF PACKAGE IS DAMAGED,  KEEP DRY,  KEEP AWAY FROM SUNLIGHT,  PROTECT FROM HEAT AND RADIOACTIVE SOURCES.]: Brand: PNEUMOSURE

## (undated) DEVICE — SUTURE VCRL SZ 0 L54IN ABSRB UD POLYGLACTIN

## (undated) DEVICE — DRAIN SPONGES,6 PLY: Brand: EXCILON

## (undated) DEVICE — TROCAR: Brand: KII SHIELDED BLADED ACCESS SYSTEM

## (undated) DEVICE — STAPLER INT STPL 5MM W/ 25 ABSRB STRP DISP

## (undated) DEVICE — SYRINGE BULB 50/CS 48/PLT: Brand: MEDEGEN MEDICAL PRODUCTS, LLC

## (undated) DEVICE — APPLICATOR ENDOSCP FOR ADJUNCTIVE HEMSTAS

## (undated) DEVICE — SUTURE PDS II 0 Z991G

## (undated) DEVICE — TROCAR: Brand: KII® SLEEVE

## (undated) DEVICE — SUTURE CHROMIC 2-0 801H

## (undated) DEVICE — ENSEAL X1 TISSUE SEALER, CURVED JAW, 37 CM SHAFT LENGTH: Brand: ENSEAL

## (undated) DEVICE — LAP CHOLE: Brand: MEDLINE INDUSTRIES, INC.

## (undated) DEVICE — EVACUATOR SUR 100CC SIL BLB WND

## (undated) DEVICE — POWDER HEMSTAT 3GM OXIDIZED REGENERATED CELOS

## (undated) DEVICE — DISPOSABLE SUCTION/IRRIGATOR TUBE SET: Brand: AHTO

## (undated) DEVICE — TROCAR: Brand: KII FIOS FIRST ENTRY

## (undated) DEVICE — Device: Brand: SUTURE PASSOR PRO

## (undated) DEVICE — Device

## (undated) DEVICE — SUTURE MCRYL SZ 3-0 L18IN ABSRB UD L19MM PS-2

## (undated) DEVICE — 3M™ STERI-STRIP™ REINFORCED ADHESIVE SKIN CLOSURES, R1547, 1/2 IN X 4 IN (12 MM X 100 MM), 6 STRIPS/ENVELOPE: Brand: 3M™ STERI-STRIP™

## (undated) DEVICE — SUTURE PDS II SZ 0 L60IN ABSRB VLT L48MM CTX

## (undated) DEVICE — GAMMEX® PI HYBRID SIZE 7, STERILE POWDER-FREE SURGICAL GLOVE, POLYISOPRENE AND NEOPRENE BLEND: Brand: GAMMEX

## (undated) DEVICE — ADHESIVE LIQ 2/3ML VI MASTISOL

## (undated) DEVICE — INSUFFLATION NEEDLE TO ESTABLISH PNEUMOPERITONEUM.: Brand: INSUFFLATION NEEDLE

## (undated) DEVICE — SOLUTION IRRIG 3000ML 0.9% NACL FLX CONT

## (undated) DEVICE — SOLUTION IRRIG 1000ML 0.9% NACL USP BTL

## (undated) NOTE — LETTER
[facility] [address] [facility]       05[facility]        Patient: Sandi Francisco   YOB: 1970   Date of Visit: 5/16/2022       Dear  Dr. Bekah Crabtree MD,      Thank you for referring Sandi Francisco to my practice. Please find my assessment and plan below. As you know he is a 49-year-old male with a history of longstanding adult onset diabetes mellitus, hypertension, asthma, hypercholesterolemia who I now had the pleasure of seeing for end-stage renal disease. The patient states that he was in the [facility] system and was just recently started on chronic hemodialysis on April 15, 2022. His insurance has just recently changed and is now transferring all his care to [facility].    His past medical history is significant for adult onset diabetes mellitus x30 years. He has had problems with diabetic retinopathy, cataracts, and diabetic peripheral neuropathy. He also has longstanding hypertension. He has a history of hypercholesterolemia but is not aware of any problems with organic heart disease. However when he initiated the process for a renal transplant there was something they were concerned about. Just saw Dr. Shannan Hart from cardiology. He ordered a number of tests. End-stage renal disease is thought to be secondary to diabetic nephropathy. Again was just started on chronic hemodialysis in April 15, 2022. Now on a Monday, Wednesday Friday schedule. Social history still is working full-time. Smokes occasional marijuana. Did smoke cigarettes but quit 30 years ago. Family history is strongly positive for diabetes but negative for renal pathology. Review of systems the patient otherwise states he is doing well without any chest pain, shortness of breath, GI or urinary tract symptoms. Still makes some urine. States though he still does not have a great appetite. 10 point review of systems is otherwise unremarkable.     On physical exam his blood pressure is 126/60 with a pulse of 75 he weighed 183 pounds. Neck was supple without JVD. Lungs were clear. Heart revealed a regular rate and rhythm with an S4 but no gallops, murmurs or rubs. Abdomen was soft, flat, nontender without organomegaly, masses or bruits. Extremities revealed trace to 1+ lower extremity edema bilaterally. Much improved though according to the patient. Overall the patient appears to be getting well adjusted to end-stage renal disease and chronic hemodialysis. Clinically feels much better since dialysis was started. We will review his routine laboratory studies at the dialysis unit. He is interested in pursuing a renal transplantion. Encouraged him to do so. He will complete the cardiac work-up. We will continue to follow him at the hemodialysis unit on his Monday, Wednesday,  Friday schedule. Still has a permacath. Will need to arrange for an AV fistula in the future. Thank you very much for allowing me to participate in the care of your patient. If you have any questions please feel free to call.            Sincerely,   Astrid Mcfarland MD   [facility] , [address] [place] [address]  [address] [facility] [address]  [place] [address]    Document electronically generated by:  Astrid Mcfarland MD

## (undated) NOTE — LETTER
[facility], [facility]  [address] [address]  PH: [phone]  FAX: [phone]        25  Shane Hawthorne, :  7/10/1970  [address] [place] [id number]    Addis,      This is the [facility], office of Dr. Jaycob Aleman     Thank you for putting your trust in [facility].  Our goal is to deliver the highest quality healthcare and an exceptional patient experience. Upon reviewing of your medical record shows you are due for the following:       Annual physical  Diabetes Care A1c  Diabetes Care Dilated Eye Exam   Diabetes Care: Foot Exam  Diabetes Care: Microalb/creat ratio  vaccines              Please call [phone] to schedule your appointment or schedule online via [facility].     If you changed to a new provider at another facility, please notify the clinic to update your records.     If you had any recent testing at another facility, please have your results faxed to our office at [phone].      Thank you and have a great day!

## (undated) NOTE — LETTER
[facility], [facility]  [address] [facility] [address]  PH: [phone]  FAX: [phone]        04/15/25  Shane Hawthorne, :  7/10/1970  [address] [place] [id number]    Addis,      This is the [facility], office of Dr. Jaycob Aleman     Thank you for putting your trust in [facility].  Our goal is to deliver the highest quality healthcare and an exceptional patient experience. Upon reviewing of your medical record shows you are due for the following:       Annual physical  Diabetes Care A1c  Diabetes Care Dilated Eye Exam   Diabetes Care: Foot Exam  Diabetes Care: Microalb/creat ratio              Please call [phone] to schedule your appointment or schedule online via [facility].     If you changed to a new provider at another facility, please notify the clinic to update your records.     If you had any recent testing at another facility, please have your results faxed to our office at [phone].      Thank you and have a great day!

## (undated) NOTE — Clinical Note
Spoke with pt today for TCM--agreeable to TCM appt with you, but requests appt after 4:30 p.m. unable to book as requested, d/t schedule limitations--sent TE to office staff for appt clarification and f/u. Thank you.  Future Appointments 1/29/2024  7:30 AM    [facility] RM4 VAS             [facility] [place] 2/5/2024   2:00 PM    Riaz Jackson PA-C         [id number]             [facility] 3/5/2024   4:30 PM    [facility] SLEEP ROOMS            [facility] SLEEP            Sleep Ctr

## (undated) NOTE — LETTER
[facility]     PICC INSERTION CONSENT     I agree to have a Peripherally Inserted Central Catheter (PICC) placed in my arm.   1. The PICC insertion procedure, care, maintenance, risks, benefits, and complications have been explained to me by my physician, carmelo _______________________, and I understand them.   2. I understand that this may not be the only way I can receive my medication. I understand that my physician has determined that the PICC would be the safest and most effective means of administering my medication at this time. If there are other options of giving medication into my veins those options have been explained to me by my physician and I have chosen this one.   3. I realize a nurse who has been specially trained and certified by the hospital and ’s representative to insert a PICC will perform this procedure. My catheter will be inserted by _____________________________.   4. I have been informed by my doctor of the nature and purpose of this procedure and the risks involved and the possibility of complications. I realize that this is an invasive procedure and has certain risks such as air embolism (air entering the catheter or my vein), arterial puncture (a tearing of one of my arteries), infection, irregular heartbeat and venous thrombosis (a blood clot in a vein) nerve injury and fracture of the catheter with or without migration.   5. In order to numb the area where the line will be placed, a small amount of anesthetic medication will be injected as ordered by my physician.   6. I understand that while the catheter will be placed in my upper arm the end of the catheter will come to rest in an area near my heart.     7. The person performing this procedure has discussed the potential benefits, risks, and side effects of the PICC; the likelihood of achieving goals; and potential problems that might occur during recuperation. They also discussed reasonable alternatives  to the PICC, including risks, benefits, and side effects related to the alternatives and risks related to not receiving this procedure.    8.  I have expressed any questions about this procedure to my physician or the PICC Proceduralist and he/she has answered them.  I certify that I have read and understand this consent to the insertion of a PICC.      _________________________________________________________   Date     Time     Patient/Guardian Signature       ____________________________________   Printed name of Patient/Guardian          ________________________________________________________________    Date        Time                   Witnessing RN Signature      Patient Name: Shane Hawthorne     : 7/10/1970                 Printed: 2024     Medical Record #: [id number]

## (undated) NOTE — LETTER
[facility], [address] [facility]       10/19/22        Patient: Silvino Flowers   YOB: 1970   Date of Visit: 10/19/2022       Dear  Dr. Cornelius Lafleur MD,      Thank you for referring Silvino Flowers to my practice. Please find my assessment and plan below. 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 On anticoagulation and dialysis. Discussed in detail with patient and options reviewed. He was instructed on sg/sx incarceration. To see cardiology next month. Elective surgery may have to wait until a longer interval since coronary stenting, to be able to stop his anticoagulation. Await input from the transplant team regarding timing and location of ventral hernia repair. He understands concerns with abdominal wall healing and mesh placement, whether before or after or concurrent with renal transplant.             Sincerely,   Noah Sher MD   [address] [address]    Document electronically generated by:  Noah Sher MD